# Patient Record
Sex: FEMALE | Race: WHITE | NOT HISPANIC OR LATINO | Employment: UNEMPLOYED | URBAN - METROPOLITAN AREA
[De-identification: names, ages, dates, MRNs, and addresses within clinical notes are randomized per-mention and may not be internally consistent; named-entity substitution may affect disease eponyms.]

---

## 2023-03-05 DIAGNOSIS — F41.9 ANXIETY: Primary | ICD-10-CM

## 2023-03-05 RX ORDER — CITALOPRAM 40 MG/1
40 TABLET, FILM COATED ORAL DAILY
Qty: 30 TABLET | Refills: 1 | Status: SHIPPED | OUTPATIENT
Start: 2023-03-05 | End: 2023-12-06 | Stop reason: WASHOUT

## 2023-03-30 DIAGNOSIS — F41.9 ANXIETY: Primary | ICD-10-CM

## 2023-03-30 RX ORDER — AMLODIPINE BESYLATE 2.5 MG/1
2.5 TABLET ORAL DAILY
COMMUNITY
End: 2023-10-02

## 2023-03-30 RX ORDER — ACETAMINOPHEN 325 MG/1
TABLET ORAL
COMMUNITY
Start: 2022-08-09 | End: 2023-12-06 | Stop reason: WASHOUT

## 2023-03-30 RX ORDER — FLUTICASONE FUROATE AND VILANTEROL TRIFENATATE 200; 25 UG/1; UG/1
POWDER RESPIRATORY (INHALATION)
COMMUNITY
Start: 2023-01-18

## 2023-03-30 RX ORDER — ORPHENADRINE CITRATE 100 MG/1
1 TABLET, EXTENDED RELEASE ORAL 2 TIMES DAILY
COMMUNITY
Start: 2022-08-09 | End: 2023-12-06 | Stop reason: WASHOUT

## 2023-03-30 RX ORDER — ACETAMINOPHEN 500 MG
TABLET ORAL
COMMUNITY
Start: 2017-11-14

## 2023-03-30 RX ORDER — ALBUTEROL SULFATE 90 UG/1
AEROSOL, METERED RESPIRATORY (INHALATION)
COMMUNITY
Start: 2021-11-01 | End: 2023-09-05 | Stop reason: SDUPTHER

## 2023-03-30 RX ORDER — NABUMETONE 500 MG/1
500 TABLET, FILM COATED ORAL 2 TIMES DAILY
COMMUNITY
End: 2024-03-08 | Stop reason: ALTCHOICE

## 2023-03-30 RX ORDER — APREMILAST 30 MG/1
1 TABLET, FILM COATED ORAL 2 TIMES DAILY
COMMUNITY
Start: 2022-08-15 | End: 2024-05-07 | Stop reason: WASHOUT

## 2023-03-30 RX ORDER — CALCIUM CARBONATE 600 MG
1 TABLET ORAL DAILY
COMMUNITY
Start: 2018-11-15 | End: 2023-12-08 | Stop reason: WASHOUT

## 2023-03-30 RX ORDER — ALBUTEROL SULFATE 0.83 MG/ML
SOLUTION RESPIRATORY (INHALATION)
COMMUNITY
Start: 2022-08-17

## 2023-03-30 RX ORDER — FOLIC ACID 1 MG/1
TABLET ORAL
COMMUNITY
End: 2023-12-29

## 2023-03-30 RX ORDER — CHLORTHALIDONE 25 MG/1
1 TABLET ORAL DAILY
COMMUNITY
Start: 2021-01-19 | End: 2024-04-15

## 2023-03-30 RX ORDER — LEVOTHYROXINE SODIUM 112 UG/1
TABLET ORAL
COMMUNITY
Start: 2023-03-05 | End: 2023-06-14 | Stop reason: ALTCHOICE

## 2023-03-30 RX ORDER — PANTOPRAZOLE SODIUM 40 MG/1
40 TABLET, DELAYED RELEASE ORAL 2 TIMES DAILY
COMMUNITY

## 2023-05-30 ENCOUNTER — TELEPHONE (OUTPATIENT)
Dept: PRIMARY CARE | Facility: CLINIC | Age: 53
End: 2023-05-30
Payer: COMMERCIAL

## 2023-05-30 DIAGNOSIS — E78.5 HYPERLIPIDEMIA, UNSPECIFIED HYPERLIPIDEMIA TYPE: Primary | ICD-10-CM

## 2023-06-14 ENCOUNTER — TELEMEDICINE (OUTPATIENT)
Dept: PRIMARY CARE | Facility: CLINIC | Age: 53
End: 2023-06-14
Payer: COMMERCIAL

## 2023-06-14 DIAGNOSIS — J45.909 ASTHMA, UNSPECIFIED ASTHMA SEVERITY, UNSPECIFIED WHETHER COMPLICATED, UNSPECIFIED WHETHER PERSISTENT (HHS-HCC): ICD-10-CM

## 2023-06-14 DIAGNOSIS — J45.901 EXACERBATION OF ASTHMA, UNSPECIFIED ASTHMA SEVERITY, UNSPECIFIED WHETHER PERSISTENT (HHS-HCC): ICD-10-CM

## 2023-06-14 DIAGNOSIS — J20.9 ACUTE BRONCHITIS, UNSPECIFIED ORGANISM: Primary | ICD-10-CM

## 2023-06-14 PROBLEM — K80.20 GALLSTONES: Status: ACTIVE | Noted: 2023-06-14

## 2023-06-14 PROBLEM — K21.9 ESOPHAGEAL REFLUX: Status: ACTIVE | Noted: 2023-06-14

## 2023-06-14 PROBLEM — R60.9 EDEMA: Status: ACTIVE | Noted: 2023-06-14

## 2023-06-14 PROBLEM — N28.1 CYST, KIDNEY, ACQUIRED: Status: ACTIVE | Noted: 2023-06-14

## 2023-06-14 PROBLEM — U07.1 COVID-19: Status: ACTIVE | Noted: 2023-06-14

## 2023-06-14 PROBLEM — G47.10 HYPERSOMNIA: Status: ACTIVE | Noted: 2023-06-14

## 2023-06-14 PROBLEM — E78.5 HYPERLIPIDEMIA: Status: ACTIVE | Noted: 2023-06-14

## 2023-06-14 PROBLEM — E03.9 HYPOTHYROIDISM: Status: ACTIVE | Noted: 2023-06-14

## 2023-06-14 PROBLEM — M79.646 FINGER PAIN: Status: ACTIVE | Noted: 2023-06-14

## 2023-06-14 PROBLEM — M50.93 CERVICAL DISC DISORDER OF CERVICOTHORACIC REGION: Status: ACTIVE | Noted: 2023-06-14

## 2023-06-14 PROBLEM — G47.33 OBSTRUCTIVE SLEEP APNEA, ADULT: Status: ACTIVE | Noted: 2023-06-14

## 2023-06-14 PROBLEM — F41.9 ANXIETY: Status: ACTIVE | Noted: 2023-06-14

## 2023-06-14 PROBLEM — L40.50 PSORIATIC ARTHROPATHY (MULTI): Status: ACTIVE | Noted: 2023-06-14

## 2023-06-14 PROBLEM — E87.6 HYPOKALEMIA: Status: ACTIVE | Noted: 2023-06-14

## 2023-06-14 PROBLEM — M15.9 OSTEOARTHRITIS, MULTIPLE SITES: Status: ACTIVE | Noted: 2023-06-14

## 2023-06-14 PROBLEM — E66.9 OBESITY (BMI 30-39.9): Status: ACTIVE | Noted: 2023-06-14

## 2023-06-14 PROBLEM — R93.1 AGATSTON CAC SCORE, <100: Status: ACTIVE | Noted: 2023-06-14

## 2023-06-14 PROCEDURE — 99214 OFFICE O/P EST MOD 30 MIN: CPT | Performed by: INTERNAL MEDICINE

## 2023-06-14 RX ORDER — LEVOTHYROXINE SODIUM 88 UG/1
88 TABLET ORAL DAILY
COMMUNITY
End: 2023-12-06 | Stop reason: DRUGHIGH

## 2023-06-14 RX ORDER — ROSUVASTATIN CALCIUM 40 MG/1
40 TABLET, COATED ORAL DAILY
COMMUNITY
End: 2023-12-12 | Stop reason: SDUPTHER

## 2023-06-14 RX ORDER — AMOXICILLIN AND CLAVULANATE POTASSIUM 400; 57 MG/5ML; MG/5ML
800 POWDER, FOR SUSPENSION ORAL 2 TIMES DAILY
Qty: 200 ML | Refills: 0 | Status: SHIPPED | OUTPATIENT
Start: 2023-06-14 | End: 2023-06-24

## 2023-06-14 RX ORDER — SULFASALAZINE 500 MG/1
TABLET ORAL
COMMUNITY
Start: 2023-06-12 | End: 2023-11-28

## 2023-06-14 RX ORDER — PREDNISONE 10 MG/1
TABLET ORAL
Qty: 21 TABLET | Refills: 0 | Status: SHIPPED | OUTPATIENT
Start: 2023-06-14 | End: 2023-06-29 | Stop reason: SDUPTHER

## 2023-06-14 RX ORDER — ECHINACEA 400 MG
CAPSULE ORAL
COMMUNITY
Start: 2021-02-02

## 2023-06-14 RX ORDER — GARLIC 1000 MG
CAPSULE ORAL
COMMUNITY
Start: 2018-11-15

## 2023-06-14 RX ORDER — IBUPROFEN 100 MG/5ML
1 SUSPENSION, ORAL (FINAL DOSE FORM) ORAL DAILY
COMMUNITY
Start: 2021-02-02

## 2023-06-14 RX ORDER — BUPROPION HYDROCHLORIDE 75 MG/1
75 TABLET ORAL DAILY
COMMUNITY
Start: 2023-05-18 | End: 2023-09-05 | Stop reason: DRUGHIGH

## 2023-06-14 ASSESSMENT — ENCOUNTER SYMPTOMS
COUGH: 1
WHEEZING: 1
FEVER: 0
FATIGUE: 1
CHILLS: 1
SORE THROAT: 1

## 2023-06-14 NOTE — PROGRESS NOTES
Subjective   Patient ID: Winter Feliz is a 52 y.o. female who presents for Cough (Pt has congested cough x 1 week, producing yellow and white phlegm.).  Cough  Associated symptoms include chills, a sore throat and wheezing. Pertinent negatives include no fever.       Cough 1 weeks with yellow pheghm.   Asthma  flareing.       Review of Systems   Constitutional:  Positive for chills and fatigue. Negative for fever.   HENT:  Positive for sore throat.    Respiratory:  Positive for cough and wheezing.    All other systems reviewed and are negative.      Objective   Physical Exam    Assessment/Plan   Problem List Items Addressed This Visit          Medium    Asthma exacerbation    Relevant Medications    predniSONE (Deltasone) 10 mg tablet    Asthma     Other Visit Diagnoses       Acute bronchitis, unspecified organism    -  Primary    Relevant Medications    amoxicillin-pot clavulanate (Augmentin) 400-57 mg/5 mL suspension    predniSONE (Deltasone) 10 mg tablet

## 2023-06-29 DIAGNOSIS — J20.9 ACUTE BRONCHITIS, UNSPECIFIED ORGANISM: ICD-10-CM

## 2023-06-29 DIAGNOSIS — J45.901 EXACERBATION OF ASTHMA, UNSPECIFIED ASTHMA SEVERITY, UNSPECIFIED WHETHER PERSISTENT (HHS-HCC): ICD-10-CM

## 2023-06-29 RX ORDER — PREDNISONE 10 MG/1
TABLET ORAL
Qty: 21 TABLET | Refills: 0 | Status: SHIPPED | OUTPATIENT
Start: 2023-06-29 | End: 2024-03-08 | Stop reason: ALTCHOICE

## 2023-08-28 PROBLEM — R06.02 SHORTNESS OF BREATH AT REST: Status: ACTIVE | Noted: 2023-08-28

## 2023-08-28 PROBLEM — N95.1 HOT FLASHES DUE TO MENOPAUSE: Status: ACTIVE | Noted: 2023-08-28
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
## 2023-08-28 PROBLEM — I10 BENIGN HYPERTENSION: Status: ACTIVE | Noted: 2023-08-28

## 2023-08-28 PROBLEM — E55.9 VITAMIN D DEFICIENCY: Status: ACTIVE | Noted: 2023-08-28

## 2023-08-28 PROBLEM — J45.20 MILD INTERMITTENT ASTHMA WITHOUT COMPLICATION (HHS-HCC): Status: ACTIVE | Noted: 2023-08-28

## 2023-08-28 PROBLEM — R11.2 NAUSEA & VOMITING: Status: ACTIVE | Noted: 2023-08-28

## 2023-08-28 PROBLEM — K76.9 DISORDER OF LIVER: Status: ACTIVE | Noted: 2023-08-28

## 2023-08-28 PROBLEM — C73 MALIGNANT NEOPLASM OF THYROID GLAND (MULTI): Status: ACTIVE | Noted: 2023-08-28

## 2023-08-28 PROBLEM — M77.10 EPICONDYLITIS, LATERAL: Status: ACTIVE | Noted: 2023-08-28

## 2023-08-28 PROBLEM — M25.569 KNEE PAIN: Status: ACTIVE | Noted: 2023-08-28

## 2023-08-28 PROBLEM — S16.1XXA STRAIN OF NECK MUSCLE: Status: ACTIVE | Noted: 2023-08-28

## 2023-08-28 PROBLEM — M54.50 LOW BACK PAIN: Status: ACTIVE | Noted: 2023-08-28

## 2023-08-28 PROBLEM — S09.90XA INJURY OF HEAD: Status: ACTIVE | Noted: 2023-08-28

## 2023-08-28 PROBLEM — F41.8 DEPRESSION WITH ANXIETY: Status: ACTIVE | Noted: 2023-08-28

## 2023-08-28 RX ORDER — CALCIUM CARBONATE 500(1250)
1 TABLET ORAL 2 TIMES DAILY
COMMUNITY
End: 2023-12-08 | Stop reason: WASHOUT

## 2023-08-28 RX ORDER — ATORVASTATIN CALCIUM 40 MG/1
40 TABLET, FILM COATED ORAL DAILY
COMMUNITY
End: 2023-12-08 | Stop reason: SDUPTHER

## 2023-08-28 RX ORDER — BUMETANIDE 0.5 MG/1
0.5 TABLET ORAL DAILY PRN
COMMUNITY
Start: 2019-02-06 | End: 2023-12-06 | Stop reason: WASHOUT

## 2023-08-28 RX ORDER — ESTRADIOL 1 MG/1
1 TABLET ORAL DAILY
COMMUNITY
End: 2023-12-06 | Stop reason: WASHOUT

## 2023-08-28 RX ORDER — POTASSIUM CHLORIDE 750 MG/1
10 TABLET, FILM COATED, EXTENDED RELEASE ORAL
COMMUNITY
Start: 2019-02-06 | End: 2023-12-08 | Stop reason: WASHOUT

## 2023-08-28 RX ORDER — CITALOPRAM 20 MG/1
20 TABLET, FILM COATED ORAL DAILY
COMMUNITY
Start: 2017-04-25 | End: 2023-12-06 | Stop reason: WASHOUT

## 2023-08-28 RX ORDER — LEVOTHYROXINE SODIUM 112 UG/1
112 TABLET ORAL
COMMUNITY
Start: 2022-09-06 | End: 2023-12-06 | Stop reason: WASHOUT

## 2023-08-28 RX ORDER — CELECOXIB 200 MG/1
1 CAPSULE ORAL DAILY
COMMUNITY
Start: 2019-02-07 | End: 2023-12-06 | Stop reason: WASHOUT

## 2023-08-28 RX ORDER — POTASSIUM CHLORIDE 750 MG/1
10 TABLET, FILM COATED, EXTENDED RELEASE ORAL DAILY
COMMUNITY
Start: 2016-05-05 | End: 2023-12-06 | Stop reason: WASHOUT

## 2023-08-28 RX ORDER — PROGESTERONE 100 MG/1
100 CAPSULE ORAL DAILY
COMMUNITY
End: 2023-12-06 | Stop reason: WASHOUT

## 2023-08-28 RX ORDER — LANOLIN ALCOHOL/MO/W.PET/CERES
300 CREAM (GRAM) TOPICAL EVERY EVENING
COMMUNITY
Start: 2015-06-17 | End: 2023-12-06 | Stop reason: WASHOUT

## 2023-08-28 RX ORDER — LEVOTHYROXINE SODIUM 175 UG/1
1 TABLET ORAL DAILY
COMMUNITY
Start: 2016-04-07 | End: 2023-12-06 | Stop reason: WASHOUT

## 2023-09-05 DIAGNOSIS — J45.901 EXACERBATION OF ASTHMA, UNSPECIFIED ASTHMA SEVERITY, UNSPECIFIED WHETHER PERSISTENT (HHS-HCC): ICD-10-CM

## 2023-09-05 DIAGNOSIS — J45.901 EXACERBATION OF ASTHMA, UNSPECIFIED ASTHMA SEVERITY, UNSPECIFIED WHETHER PERSISTENT (HHS-HCC): Primary | ICD-10-CM

## 2023-09-05 RX ORDER — ALBUTEROL SULFATE 90 UG/1
2 AEROSOL, METERED RESPIRATORY (INHALATION) EVERY 6 HOURS PRN
Qty: 54 G | Refills: 1 | Status: SHIPPED | OUTPATIENT
Start: 2023-09-05 | End: 2024-03-08 | Stop reason: ALTCHOICE

## 2023-09-05 RX ORDER — ALBUTEROL SULFATE 90 UG/1
2 AEROSOL, METERED RESPIRATORY (INHALATION) EVERY 6 HOURS PRN
Qty: 18 G | Refills: 3 | Status: SHIPPED | OUTPATIENT
Start: 2023-09-05 | End: 2023-09-05 | Stop reason: SDUPTHER

## 2023-09-05 RX ORDER — BUPROPION HYDROCHLORIDE 150 MG/1
150 TABLET ORAL DAILY
COMMUNITY
Start: 2023-08-24

## 2023-11-01 ENCOUNTER — APPOINTMENT (OUTPATIENT)
Dept: RHEUMATOLOGY | Facility: CLINIC | Age: 53
End: 2023-11-01
Payer: COMMERCIAL

## 2023-11-02 ENCOUNTER — APPOINTMENT (OUTPATIENT)
Dept: RHEUMATOLOGY | Facility: CLINIC | Age: 53
End: 2023-11-02
Payer: COMMERCIAL

## 2023-11-03 ENCOUNTER — APPOINTMENT (OUTPATIENT)
Dept: SLEEP MEDICINE | Facility: CLINIC | Age: 53
End: 2023-11-03
Payer: COMMERCIAL

## 2023-11-16 ENCOUNTER — LAB (OUTPATIENT)
Dept: LAB | Facility: LAB | Age: 53
End: 2023-11-16
Payer: COMMERCIAL

## 2023-11-16 DIAGNOSIS — C73 MALIGNANT NEOPLASM OF THYROID GLAND (MULTI): Primary | ICD-10-CM

## 2023-11-16 DIAGNOSIS — E78.5 HYPERLIPIDEMIA, UNSPECIFIED HYPERLIPIDEMIA TYPE: ICD-10-CM

## 2023-11-16 DIAGNOSIS — Z79.1 LONG TERM (CURRENT) USE OF NON-STEROIDAL ANTI-INFLAMMATORIES (NSAID): ICD-10-CM

## 2023-11-16 DIAGNOSIS — M06.09 RHEUMATOID ARTHRITIS WITHOUT RHEUMATOID FACTOR, MULTIPLE SITES (MULTI): ICD-10-CM

## 2023-11-16 LAB
ALBUMIN SERPL BCP-MCNC: 4.9 G/DL (ref 3.4–5)
ALP SERPL-CCNC: 67 U/L (ref 33–110)
ALT SERPL W P-5'-P-CCNC: 17 U/L (ref 7–45)
ANION GAP SERPL CALC-SCNC: 16 MMOL/L (ref 10–20)
APPEARANCE UR: CLEAR
AST SERPL W P-5'-P-CCNC: 19 U/L (ref 9–39)
BASOPHILS # BLD AUTO: 0.07 X10*3/UL (ref 0–0.1)
BASOPHILS NFR BLD AUTO: 1 %
BILIRUB SERPL-MCNC: 1 MG/DL (ref 0–1.2)
BILIRUB UR STRIP.AUTO-MCNC: NEGATIVE MG/DL
BUN SERPL-MCNC: 18 MG/DL (ref 6–23)
CALCIUM SERPL-MCNC: 9.7 MG/DL (ref 8.6–10.6)
CHLORIDE SERPL-SCNC: 99 MMOL/L (ref 98–107)
CHOLEST SERPL-MCNC: 200 MG/DL (ref 0–199)
CHOLESTEROL/HDL RATIO: 3.8
CK SERPL-CCNC: 193 U/L (ref 0–215)
CO2 SERPL-SCNC: 30 MMOL/L (ref 21–32)
COLOR UR: YELLOW
CREAT SERPL-MCNC: 0.9 MG/DL (ref 0.5–1.05)
CRP SERPL-MCNC: 0.26 MG/DL
EOSINOPHIL # BLD AUTO: 0.24 X10*3/UL (ref 0–0.7)
EOSINOPHIL NFR BLD AUTO: 3.4 %
ERYTHROCYTE [DISTWIDTH] IN BLOOD BY AUTOMATED COUNT: 13 % (ref 11.5–14.5)
ERYTHROCYTE [SEDIMENTATION RATE] IN BLOOD BY WESTERGREN METHOD: 22 MM/H (ref 0–30)
GFR SERPL CREATININE-BSD FRML MDRD: 76 ML/MIN/1.73M*2
GLUCOSE SERPL-MCNC: 98 MG/DL (ref 74–99)
GLUCOSE UR STRIP.AUTO-MCNC: NEGATIVE MG/DL
HCT VFR BLD AUTO: 39.4 % (ref 36–46)
HDLC SERPL-MCNC: 52.4 MG/DL
HGB BLD-MCNC: 13.1 G/DL (ref 12–16)
HOLD SPECIMEN: NORMAL
IMM GRANULOCYTES # BLD AUTO: 0.06 X10*3/UL (ref 0–0.7)
IMM GRANULOCYTES NFR BLD AUTO: 0.8 % (ref 0–0.9)
KETONES UR STRIP.AUTO-MCNC: NEGATIVE MG/DL
LDLC SERPL CALC-MCNC: 97 MG/DL
LEUKOCYTE ESTERASE UR QL STRIP.AUTO: NEGATIVE
LYMPHOCYTES # BLD AUTO: 2.65 X10*3/UL (ref 1.2–4.8)
LYMPHOCYTES NFR BLD AUTO: 37.4 %
MCH RBC QN AUTO: 27.8 PG (ref 26–34)
MCHC RBC AUTO-ENTMCNC: 33.2 G/DL (ref 32–36)
MCV RBC AUTO: 84 FL (ref 80–100)
MONOCYTES # BLD AUTO: 0.48 X10*3/UL (ref 0.1–1)
MONOCYTES NFR BLD AUTO: 6.8 %
NEUTROPHILS # BLD AUTO: 3.59 X10*3/UL (ref 1.2–7.7)
NEUTROPHILS NFR BLD AUTO: 50.6 %
NITRITE UR QL STRIP.AUTO: NEGATIVE
NON HDL CHOLESTEROL: 148 MG/DL (ref 0–149)
NRBC BLD-RTO: 0 /100 WBCS (ref 0–0)
PH UR STRIP.AUTO: 6 [PH]
PLATELET # BLD AUTO: 343 X10*3/UL (ref 150–450)
POTASSIUM SERPL-SCNC: 3.6 MMOL/L (ref 3.5–5.3)
PROT SERPL-MCNC: 7.4 G/DL (ref 6.4–8.2)
PROT UR STRIP.AUTO-MCNC: NEGATIVE MG/DL
RBC # BLD AUTO: 4.72 X10*6/UL (ref 4–5.2)
RBC # UR STRIP.AUTO: NEGATIVE /UL
SODIUM SERPL-SCNC: 141 MMOL/L (ref 136–145)
SP GR UR STRIP.AUTO: 1.02
TRIGL SERPL-MCNC: 252 MG/DL (ref 0–149)
TSH SERPL-ACNC: 14.86 MIU/L (ref 0.44–3.98)
UROBILINOGEN UR STRIP.AUTO-MCNC: <2 MG/DL
VLDL: 50 MG/DL (ref 0–40)
WBC # BLD AUTO: 7.1 X10*3/UL (ref 4.4–11.3)

## 2023-11-16 PROCEDURE — 81490 AUTOIMMUNE RA ALYS 12 BMRK: CPT

## 2023-11-16 PROCEDURE — 85652 RBC SED RATE AUTOMATED: CPT

## 2023-11-16 PROCEDURE — 85025 COMPLETE CBC W/AUTO DIFF WBC: CPT

## 2023-11-16 PROCEDURE — 86800 THYROGLOBULIN ANTIBODY: CPT

## 2023-11-16 PROCEDURE — 80061 LIPID PANEL: CPT

## 2023-11-16 PROCEDURE — 84432 ASSAY OF THYROGLOBULIN: CPT

## 2023-11-16 PROCEDURE — 36415 COLL VENOUS BLD VENIPUNCTURE: CPT

## 2023-11-16 PROCEDURE — 86140 C-REACTIVE PROTEIN: CPT

## 2023-11-16 PROCEDURE — 81003 URINALYSIS AUTO W/O SCOPE: CPT

## 2023-11-16 PROCEDURE — 80053 COMPREHEN METABOLIC PANEL: CPT

## 2023-11-16 PROCEDURE — 89240 UNLISTED MISC PATH TEST: CPT

## 2023-11-16 PROCEDURE — 82550 ASSAY OF CK (CPK): CPT

## 2023-11-16 PROCEDURE — 84443 ASSAY THYROID STIM HORMONE: CPT

## 2023-11-18 LAB
BILL ONLY-THYROGLOBULIN: NORMAL
THYROGLOB AB SERPL-ACNC: <0.9 IU/ML (ref 0–4)
THYROGLOB SERPL-MCNC: 0.2 NG/ML (ref 1.3–31.8)
THYROGLOB SERPL-MCNC: ABNORMAL NG/ML (ref 1.3–31.8)

## 2023-11-26 DIAGNOSIS — M06.09 RHEUMATOID ARTHRITIS WITHOUT RHEUMATOID FACTOR, MULTIPLE SITES (MULTI): ICD-10-CM

## 2023-11-27 LAB — SCAN RESULT: NORMAL

## 2023-11-28 RX ORDER — SULFASALAZINE 500 MG/1
500 TABLET ORAL 2 TIMES DAILY
Qty: 60 TABLET | Refills: 5 | Status: SHIPPED | OUTPATIENT
Start: 2023-11-28 | End: 2023-12-08

## 2023-12-06 ENCOUNTER — OFFICE VISIT (OUTPATIENT)
Dept: ENDOCRINOLOGY | Facility: CLINIC | Age: 53
End: 2023-12-06
Payer: COMMERCIAL

## 2023-12-06 VITALS
SYSTOLIC BLOOD PRESSURE: 133 MMHG | HEART RATE: 84 BPM | WEIGHT: 177 LBS | BODY MASS INDEX: 30.38 KG/M2 | DIASTOLIC BLOOD PRESSURE: 80 MMHG

## 2023-12-06 DIAGNOSIS — E89.0 POSTOPERATIVE HYPOTHYROIDISM: Primary | ICD-10-CM

## 2023-12-06 PROCEDURE — 99213 OFFICE O/P EST LOW 20 MIN: CPT | Performed by: INTERNAL MEDICINE

## 2023-12-06 PROCEDURE — 3079F DIAST BP 80-89 MM HG: CPT | Performed by: INTERNAL MEDICINE

## 2023-12-06 PROCEDURE — 1036F TOBACCO NON-USER: CPT | Performed by: INTERNAL MEDICINE

## 2023-12-06 PROCEDURE — 3075F SYST BP GE 130 - 139MM HG: CPT | Performed by: INTERNAL MEDICINE

## 2023-12-06 RX ORDER — LEVOTHYROXINE SODIUM 100 UG/1
100 TABLET ORAL
Qty: 90 TABLET | Refills: 3 | Status: SHIPPED | OUTPATIENT
Start: 2023-12-06 | End: 2024-02-06 | Stop reason: WASHOUT

## 2023-12-06 NOTE — PROGRESS NOTES
History Of Present Illness  Winter Feliz is a 53 y.o. female with postoperative hypothyroidism    History of thyroid cancer  Thyroid lobectomy and completion thyroidectomy ()  131-iodine ()    Synthroid decreased from 112 to 88 mcg/day  Patient is taking levothyroxine on an empty stomach with water alone.    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    Surgical History  She has a past surgical history that includes  section, classic (2016); Thyroid surgery (2016); Other surgical history (2022); Other surgical history (10/04/2022); and Other surgical history (10/04/2022).     Social History  She reports that she has never smoked. She has never used smokeless tobacco. She reports that she does not currently use alcohol. She reports that she does not use drugs.    Family History  Family History   Problem Relation Name Age of Onset    Breast cancer Mother          metastatic    No Known Problems Father      No Known Problems Daughter      No Known Problems Son      No Known Problems Mother's Sister      Hypertension Maternal Grandmother      Glaucoma Maternal Grandmother      No Known Problems Maternal Grandfather      No Known Problems Paternal Grandmother      No Known Problems Paternal Grandfather         Medications  Current Outpatient  Medications   Medication Instructions    albuterol 2.5 mg /3 mL (0.083 %) nebulizer solution inhalation    albuterol 90 mcg/actuation inhaler 2 puffs, inhalation, Every 6 hours PRN    amLODIPine (NORVASC) 2.5 mg, oral, Daily    ascorbic acid (Vitamin C) 1,000 mg tablet 1 tablet, oral, Daily    atorvastatin (LIPITOR) 40 mg, oral, Daily    Breo Ellipta 200-25 mcg/dose inhaler INHALE 1 PUFF ONCE A DAY 90    buPROPion XL (WELLBUTRIN XL) 150 mg, oral, Daily    calcium carbonate (Oscal) 500 mg calcium (1,250 mg) tablet 1 tablet, 2 times daily    calcium carbonate 600 mg calcium (1,500 mg) tablet 1 tablet, oral, Daily    chlorthalidone (Hygroton) 25 mg tablet 1 tablet, oral, Daily    cholecalciferol (Vitamin D-3) 50 mcg (2,000 unit) capsule oral    elderberry fruit and flower 460-115 mg capsule oral    folic acid (Folvite) 1 mg tablet TAKE 1 TABLET ORALLY ONCE A DAY EXCEPT THE DAY OF METHOTREXATE 90 DAYS    garlic 1,000 mg capsule oral    nabumetone (RELAFEN) 500 mg, oral, 2 times daily    Otezla 30 mg tablet 1 tablet, oral, 2 times daily    pantoprazole (PROTONIX) 40 mg, oral, 2 times daily    potassium chloride CR (Klor-Con 10) 10 mEq ER tablet 10 mEq, oral, 2 times daily with meals    predniSONE (Deltasone) 10 mg tablet Take 6 po day one, 5 po  day 2 , 4 po  day 3 , 3  po day 4, 2 po day  5, 1 po day 6    rosuvastatin (CRESTOR) 40 mg, oral, Daily    sulfaSALAzine (AZULFIDINE) 500 mg, oral, 2 times daily    Synthroid 88 mcg, oral, Daily       Allergies  Iodine      Last Recorded Vitals  Blood pressure 133/80, pulse 84, weight 80.3 kg (177 lb).    Physical Exam  Constitutional:       General: She is not in acute distress.  HENT:      Head: Normocephalic.   Neurological:      Mental Status: She is alert.   Psychiatric:         Mood and Affect: Affect normal.          Relevant Results  Lab Results   Component Value Date    TSH 14.86 (H) 11/16/2023    FREET4 2.1 (H) 05/30/2023         IMPRESSION  HYPOTHYROIDISM,  POSTOPERATIVE  History of thyroid cancer, surgery 16 years ago  Poor response to generic levothyroxine  Hypothyroid on Synthroid 88 mcg/day  TSH previously suppressed on 112 mcg/day    RECOMMENDATIONS  Synthroid 100 mcg once daily  Take levothyroxine on an empty stomach with water alone, 1 hour before eating or taking other medications, 4 hours before any calcium or iron supplement.    Follow up 4 months  Repeat TSH before next appointment

## 2023-12-06 NOTE — PATIENT INSTRUCTIONS
RECOMMENDATIONS  Synthroid 100 mcg once daily  Take levothyroxine on an empty stomach with water alone, 1 hour before eating or taking other medications, 4 hours before any calcium or iron supplement.    Follow up 4 months  Repeat TSH before next appointment

## 2023-12-07 DIAGNOSIS — E78.5 DYSLIPIDEMIA: Primary | ICD-10-CM

## 2023-12-07 RX ORDER — EZETIMIBE 10 MG/1
10 TABLET ORAL DAILY
Qty: 30 TABLET | Refills: 5 | Status: SHIPPED | OUTPATIENT
Start: 2023-12-07 | End: 2024-04-01

## 2023-12-08 ENCOUNTER — OFFICE VISIT (OUTPATIENT)
Dept: SLEEP MEDICINE | Facility: CLINIC | Age: 53
End: 2023-12-08
Payer: COMMERCIAL

## 2023-12-08 ENCOUNTER — OFFICE VISIT (OUTPATIENT)
Dept: RHEUMATOLOGY | Facility: CLINIC | Age: 53
End: 2023-12-08
Payer: COMMERCIAL

## 2023-12-08 VITALS
HEIGHT: 64 IN | SYSTOLIC BLOOD PRESSURE: 114 MMHG | BODY MASS INDEX: 30.26 KG/M2 | HEART RATE: 78 BPM | OXYGEN SATURATION: 96 % | WEIGHT: 177.25 LBS | DIASTOLIC BLOOD PRESSURE: 68 MMHG

## 2023-12-08 VITALS
BODY MASS INDEX: 30.63 KG/M2 | TEMPERATURE: 97.9 F | HEART RATE: 84 BPM | HEIGHT: 64 IN | WEIGHT: 179.4 LBS | DIASTOLIC BLOOD PRESSURE: 77 MMHG | SYSTOLIC BLOOD PRESSURE: 143 MMHG

## 2023-12-08 DIAGNOSIS — Z72.821 INADEQUATE SLEEP HYGIENE: ICD-10-CM

## 2023-12-08 DIAGNOSIS — L40.9 PSORIASIS: ICD-10-CM

## 2023-12-08 DIAGNOSIS — Z79.899 ENCOUNTER FOR LONG-TERM (CURRENT) USE OF MEDICATIONS: ICD-10-CM

## 2023-12-08 DIAGNOSIS — L40.50 PSORIATIC ARTHROPATHY (MULTI): Primary | ICD-10-CM

## 2023-12-08 DIAGNOSIS — M06.09 POLYARTHRITIS WITH NEGATIVE RHEUMATOID FACTOR (MULTI): ICD-10-CM

## 2023-12-08 DIAGNOSIS — M47.819 SPONDYLOARTHRITIS: ICD-10-CM

## 2023-12-08 DIAGNOSIS — G47.33 OBSTRUCTIVE SLEEP APNEA, ADULT: Primary | ICD-10-CM

## 2023-12-08 DIAGNOSIS — E55.9 VITAMIN D DEFICIENCY: ICD-10-CM

## 2023-12-08 DIAGNOSIS — I10 BENIGN HYPERTENSION: ICD-10-CM

## 2023-12-08 PROCEDURE — 1036F TOBACCO NON-USER: CPT | Performed by: INTERNAL MEDICINE

## 2023-12-08 PROCEDURE — 99215 OFFICE O/P EST HI 40 MIN: CPT | Performed by: INTERNAL MEDICINE

## 2023-12-08 PROCEDURE — 3077F SYST BP >= 140 MM HG: CPT | Performed by: STUDENT IN AN ORGANIZED HEALTH CARE EDUCATION/TRAINING PROGRAM

## 2023-12-08 PROCEDURE — 3074F SYST BP LT 130 MM HG: CPT | Performed by: INTERNAL MEDICINE

## 2023-12-08 PROCEDURE — 3078F DIAST BP <80 MM HG: CPT | Performed by: STUDENT IN AN ORGANIZED HEALTH CARE EDUCATION/TRAINING PROGRAM

## 2023-12-08 PROCEDURE — 1036F TOBACCO NON-USER: CPT | Performed by: STUDENT IN AN ORGANIZED HEALTH CARE EDUCATION/TRAINING PROGRAM

## 2023-12-08 PROCEDURE — 3078F DIAST BP <80 MM HG: CPT | Performed by: INTERNAL MEDICINE

## 2023-12-08 PROCEDURE — 95977 ALYS CPLX CN NPGT PRGRMG: CPT | Performed by: STUDENT IN AN ORGANIZED HEALTH CARE EDUCATION/TRAINING PROGRAM

## 2023-12-08 PROCEDURE — 99214 OFFICE O/P EST MOD 30 MIN: CPT | Performed by: STUDENT IN AN ORGANIZED HEALTH CARE EDUCATION/TRAINING PROGRAM

## 2023-12-08 ASSESSMENT — PATIENT HEALTH QUESTIONNAIRE - PHQ9
SUM OF ALL RESPONSES TO PHQ9 QUESTIONS 1 AND 2: 0
1. LITTLE INTEREST OR PLEASURE IN DOING THINGS: NOT AT ALL
2. FEELING DOWN, DEPRESSED OR HOPELESS: NOT AT ALL

## 2023-12-08 ASSESSMENT — ROUTINE ASSESSMENT OF PATIENT INDEX DATA (RAPID3)
TOTAL RAPID3 SCORE: 10.3
WASH_DRY_BODY: WITH SOME DIFFICULTY
SUM OF QUESTIONS A TO J: 13
LIFT_CUP_TO_MOUTH: WITH SOME DIFFICULTY
WEIGHTED_TOTAL_SCORE: 3.43
IN_OUT_TRANSPORT: WITH SOME DIFFICULTY
WALK_FLAT_GROUND: WITH SOME DIFFICULTY
ON A SCALE OF ONE TO TEN, HOW MUCH PAIN HAVE YOU HAD BECAUSE OF YOUR CONDITION OVER THE PAST WEEK?: 4
TURN_FAUCETS_OFF: WITH SOME DIFFICULTY
FN_SCORE: 4.3
ON A SCALE OF ONE TO TEN, HOW MUCH PAIN HAVE YOU HAD BECAUSE OF YOUR CONDITION OVER THE PAST WEEK?: 4
IN_OUT_BED: WITH SOME DIFFICULTY
PARTIPATE_RECREATIONAL_ACTIVITIES: UNABLE TO DO
ON A SCALE OF ONE TO TEN, CONSIDERING ALL THE WAYS IN WHICH ILLNESS AND HEALTH CONDITIONS MAY AFFECT YOU AT THIS TIME, PLEASE INDICATE BELOW HOW YOU ARE DOING:: 2
FEELINGS_DEPRESSION: WITH SOME DIFFICULTY
WALK_KILOMETERS: UNABLE TO DO
ON A SCALE OF ONE TO TEN, CONSIDERING ALL THE WAYS IN WHICH ILLNESS AND HEALTH CONDITIONS MAY AFFECT YOU AT THIS TIME, PLEASE INDICATE BELOW HOW YOU ARE DOING:: 2
FEELINGS_ANXIETY_NERVOUS: WITH SOME DIFFICULTY
SEVERITY_SCORE: 0
PICK_CLOTHES_OFF_FLOOR: WITH SOME DIFFICULTY
DRESS_YOURSELF: WITHOUT ANY DIFFICULTY
GOOD_NIGHTS_SLEEP: WITH SOME DIFFICULTY

## 2023-12-08 ASSESSMENT — SLEEP AND FATIGUE QUESTIONNAIRES
DIFFICULTY_STAYING_ASLEEP: MILD
HOW LIKELY ARE YOU TO NOD OFF OR FALL ASLEEP WHILE LYING DOWN TO REST IN THE AFTERNOON WHEN CIRCUMSTANCES PERMIT: HIGH CHANCE OF DOZING
HOW LIKELY ARE YOU TO NOD OFF OR FALL ASLEEP WHILE WATCHING TV: MODERATE CHANCE OF DOZING
HOW LIKELY ARE YOU TO NOD OFF OR FALL ASLEEP IN A CAR, WHILE STOPPED FOR A FEW MINUTES IN TRAFFIC: MODERATE CHANCE OF DOZING
WORRIED_DISTRESSED_DUE_TO_SLEEP: A LITTLE
HOW LIKELY ARE YOU TO NOD OFF OR FALL ASLEEP WHILE SITTING AND READING: MODERATE CHANCE OF DOZING
DIFFICULTY_FALLING_ASLEEP: MODERATE
HOW LIKELY ARE YOU TO NOD OFF OR FALL ASLEEP WHILE SITTING AND TALKING TO SOMEONE: WOULD NEVER DOZE
SLEEP_PROBLEM_NOTICEABLE_TO_OTHERS: A LITTLE
SITING INACTIVE IN A PUBLIC PLACE LIKE A CLASS ROOM OR A MOVIE THEATER: WOULD NEVER DOZE
SATISFACTION_WITH_CURRENT_SLEEP_PATTERN: SATISFIED
SLEEP_PROBLEM_INTERFERES_DAILY_ACTIVITIES: A LITTLE
ESS-CHAD TOTAL SCORE: 11
HOW LIKELY ARE YOU TO NOD OFF OR FALL ASLEEP WHILE SITTING QUIETLY AFTER LUNCH WITHOUT ALCOHOL: WOULD NEVER DOZE
HOW LIKELY ARE YOU TO NOD OFF OR FALL ASLEEP WHEN YOU ARE A PASSENGER IN A CAR FOR AN HOUR WITHOUT A BREAK: MODERATE CHANCE OF DOZING

## 2023-12-08 ASSESSMENT — PAIN SCALES - GENERAL: PAINLEVEL_OUTOF10: 4

## 2023-12-08 ASSESSMENT — ENCOUNTER SYMPTOMS
LOSS OF SENSATION IN FEET: 0
DEPRESSION: 1
OCCASIONAL FEELINGS OF UNSTEADINESS: 0

## 2023-12-08 ASSESSMENT — PAIN - FUNCTIONAL ASSESSMENT: PAIN_FUNCTIONAL_ASSESSMENT: 0-10

## 2023-12-08 NOTE — PROGRESS NOTES
[facility]/  Rheumatology  [address], [address]  [place] [address]  Phone: [phone]  Fax: [phone]    Rheumatology Progress Note 12/8/23    Winter Feliz is a 53 y.o. female here for   Chief Complaint   Patient presents with    Follow-up     labs       Last Visit: 6/12/23    Rheum Hx    Features of SpA with elbow enthesitis/medial epicondylitis, Achillis  tendonitis, sacroiliac tenderness, possible psoriasis versus seborrheic  dermatitis, in addition to OA  Responds well to nabumetone  Noted labs before showed mild transaminitis deemed likely fatty liver  Referred by Dr Campbell for evaluate and treat- generalized OA.  Chief complaint: Arthritis-osteoarthritis  Since 6 months ago  Slow onset  Progressive remitting course  Precipitating factors: Walking, housework, caregiver job  Associated symptoms: Stiffness, swelling  Better: Sitting  Worse: Activity  Previous treatments:  Naproxen-somewhat helpful  Ibuprofen-somewhat helpful  Tylenol- no help  Nabumetone-very helpful  Occupation: Caregiver  Currently tenderness of bilateral shoulders, elbows, lower back,  bilateral hands, bilateral knees, bilateral ankles  Swelling of bilateral ankles  5-10 minutes morning stiffness  Review of systems positive for:  Fatigue  Scalp tenderness/dandruff  Dry eyes dry mouth- Restasis no help and burned  Swelling of the legs by the end of the day  History of asthma- well controlled  Gastroesophageal reflux disease- controlled w PPI/IBS  Thyroid cancer 2007 s/p resection and radiotherapy  Rashes- around face on forehead- itchy, scaly, red  Joint pain swelling stiffness  Back pain that wakes her up from sleep and improves upon  getting up- elvira SI area and in betwee shoulder blades; hx of sciatica;  no hx of trauma  Muscle aches  Weakness arms legs sometimes probably due to pain  Thyroid disease  Last menstrual period 2007- endometrial ablation hx; DXA yrs  ok  Allergies  Depression  anxiety  Sleep disturbance- has IGGY and just had Inspire implant- not  on yet.  Component      Latest Ref Rng 9/23/2021 5/30/2023   DEEPIKA Negative…     DEEPIKA Titer Negative…     DEEPIKA Pattern (Note)…     SSA ANTIBODIES <0.2…     SSB ANTIBODIES <0.2…     Angiotensin 1 Conv 27…     HLA-B27 Dna Result Negative…     Citrulline Antibody, IgG <15…     Rheumatoid Factor      0 - 20 IU/ML 10     RNP Antibody <0.2…     Thyroglobulin Abs  <0.9…          Previous Tx  Naproxen-somewhat helpful  Ibuprofen-somewhat helpful  Tylenol- no help  Nabumetone-very helpful  Methotrexate- discontinued due to GI upset  SSZ- discontinued due to lack of efficacy  Otezla- currently on    Health Maintenance  DXA-  T -0.7 (Hip; 11/2021)  Component      Latest Ref Rng 9/23/2021   HIV 1/2 Antigen/Antibody Screen with Reflex to Confirmation Non Reactive…    HIV 1/2 Antibody Confirmation Test Not Indicated    Hiv Interpretation Negative…    Hepatitis B Surface AG      NEG  NEGATIVE    Hepatitis C AB Negative…      Component      Latest Ref Rng 10/5/2021   TB Result Negative…      Malignancy Hx- thyroid cancer, s/p removal  Immunization History   Administered Date(s) Administered    Flu vaccine (IIV4), preservative free *Check age/dose* 09/25/2016, 09/14/2020, 10/12/2021, 10/09/2022    Hepatitis B vaccine, adult (RECOMBIVAX, ENGERIX) 08/10/2011    Influenza Whole 09/13/2013    Influenza, Unspecified 09/22/2011    Influenza, injectable, MDCK, quadrivalent 09/25/2018    Influenza, injectable, quadrivalent 09/11/2017, 11/12/2019, 11/09/2020    Influenza, seasonal, injectable 10/11/2015, 10/12/2016    Novel influenza-H1N1-09, preservative-free 12/30/2009    Pfizer COVID-19 vaccine, bivalent, age 12 years and older (30 mcg/0.3 mL) 10/09/2022    Pfizer Purple Cap SARS-CoV-2 03/22/2021, 04/22/2021, 12/10/2021    Pneumococcal conjugate vaccine, 13-valent (PREVNAR 13) 12/12/2022    Pneumococcal polysaccharide vaccine, 23-valent, age 2 years and older (PNEUMOVAX  23) 2017    Td vaccine, age 7 years and older (TDVAX) 2009          Past Medical History:   Diagnosis Date    Asthma     Body mass index (BMI) 33.0-33.9, adult 2022    BMI 33.0-33.9,adult    Cyst of right kidney     resolved per last U/S    Dyslipidemia     Gallstones     GERD (gastroesophageal reflux disease)     History of dysphagia     since thyroidectomy    Hypertension     IGGY (obstructive sleep apnea)     Thyroid cancer (CMS/HCC)       Past Surgical History:   Procedure Laterality Date     SECTION, CLASSIC  2016     Section    FOOT SURGERY      cyst removed from L  foot between 1st and 2nd MTP    HAND SURGERY Right     R  cyst removal    OTHER SURGICAL HISTORY      hypoglossal nerve stimulator- INSPIRE    TOTAL THYROIDECTOMY      first partial, then total      Current Outpatient Medications   Medication Sig Dispense Refill    albuterol 2.5 mg /3 mL (0.083 %) nebulizer solution Inhale.      albuterol 90 mcg/actuation inhaler Inhale 2 puffs every 6 hours if needed for wheezing. 54 g 1    ascorbic acid (Vitamin C) 1,000 mg tablet Take 1 tablet (1,000 mg) by mouth once daily.      Breo Ellipta 200-25 mcg/dose inhaler INHALE 1 PUFF ONCE A DAY 90      buPROPion XL (Wellbutrin XL) 150 mg 24 hr tablet Take 1 tablet (150 mg) by mouth once daily.      chlorthalidone (Hygroton) 25 mg tablet Take 1 tablet (25 mg) by mouth once daily.      cholecalciferol (Vitamin D-3) 50 mcg (2,000 unit) capsule Take by mouth.      elderberry fruit and flower 460-115 mg capsule Take by mouth.      ezetimibe (Zetia) 10 mg tablet Take 1 tablet (10 mg) by mouth once daily. 30 tablet 5    garlic 1,000 mg capsule Take by mouth.      nabumetone (Relafen) 500 mg tablet Take 1 tablet (500 mg) by mouth 2 times a day.      Otezla 30 mg tablet Take 1 tablet (30 mg) by mouth 2 times a day.      pantoprazole (ProtoNix) 40 mg EC tablet Take 1 tablet (40 mg) by mouth 2 times a day.      predniSONE (Deltasone)  "10 mg tablet Take 6 po day one, 5 po  day 2 , 4 po  day 3 , 3  po day 4, 2 po day  5, 1 po day 6 21 tablet 0    Synthroid 100 mcg tablet Take 1 tablet (100 mcg) by mouth once daily in the morning. Take before meals. 90 tablet 3    amLODIPine (Norvasc) 2.5 mg tablet TAKE 1 TABLET BY MOUTH EVERY DAY 90 tablet 3    folic acid (Folvite) 1 mg tablet TAKE 1 TABLET ORALLY ONCE A DAY EXCEPT THE DAY OF METHOTREXATE 90 DAYS 90 tablet 3    rosuvastatin (Crestor) 40 mg tablet Take 1 tablet (40 mg) by mouth once daily. 90 tablet 3     No current facility-administered medications for this visit.      Allergies   Allergen Reactions    Lipitor [Atorvastatin] Other     Muscle aches    Methotrexate GI Upset    Iodine Other     vomiting        Vitals:    12/08/23 0900   BP: 114/68   Pulse: 78   SpO2: 96%   Weight: 80.4 kg (177 lb 4 oz)   Height: 1.626 m (5' 4\")     Pain Assessment Pain Score: 4     Rapid 3  Function Score (FN): 4.3  Pain Score (PN) (0-10): 4  Patient Global (PTGL) (0-10): 2  Rapid3 Score: 10.3  RAPID3 Weighted Score: 3.43       Workup  Component      Latest Ref Rng 11/16/2023   WBC      4.4 - 11.3 x10*3/uL 7.1    nRBC      0.0 - 0.0 /100 WBCs 0.0    RBC      4.00 - 5.20 x10*6/uL 4.72    HEMOGLOBIN      12.0 - 16.0 g/dL 13.1    HEMATOCRIT      36.0 - 46.0 % 39.4    MCV      80 - 100 fL 84    MCH      26.0 - 34.0 pg 27.8    MCHC      32.0 - 36.0 g/dL 33.2    RED CELL DISTRIBUTION WIDTH      11.5 - 14.5 % 13.0    Platelets      150 - 450 x10*3/uL 343    Neutrophils %      40.0 - 80.0 % 50.6    Immature Granulocytes %, Automated      0.0 - 0.9 % 0.8    Lymphocytes %      13.0 - 44.0 % 37.4    Monocytes %      2.0 - 10.0 % 6.8    Eosinophils %      0.0 - 6.0 % 3.4    Basophils %      0.0 - 2.0 % 1.0    Neutrophils Absolute      1.20 - 7.70 x10*3/uL 3.59    Immature Granulocytes Absolute, Automated      0.00 - 0.70 x10*3/uL 0.06    Lymphocytes Absolute      1.20 - 4.80 x10*3/uL 2.65    Monocytes Absolute      0.10 - 1.00 " x10*3/uL 0.48    Eosinophils Absolute      0.00 - 0.70 x10*3/uL 0.24    Basophils Absolute      0.00 - 0.10 x10*3/uL 0.07    GLUCOSE      74 - 99 mg/dL 98    SODIUM      136 - 145 mmol/L 141    POTASSIUM      3.5 - 5.3 mmol/L 3.6    CHLORIDE      98 - 107 mmol/L 99    Bicarbonate      21 - 32 mmol/L 30    Anion Gap      10 - 20 mmol/L 16    Blood Urea Nitrogen      6 - 23 mg/dL 18    Creatinine      0.50 - 1.05 mg/dL 0.90    EGFR      >60 mL/min/1.73m*2 76    Calcium      8.6 - 10.6 mg/dL 9.7    Albumin      3.4 - 5.0 g/dL 4.9    Alkaline Phosphatase      33 - 110 U/L 67    Total Protein      6.4 - 8.2 g/dL 7.4    AST      9 - 39 U/L 19    Bilirubin Total      0.0 - 1.2 mg/dL 1.0    ALT      7 - 45 U/L 17    Color, Urine      Straw, Yellow  Yellow    Appearance, Urine      Clear  Clear    Specific Gravity, Urine      1.005 - 1.035  1.019    pH, Urine      5.0, 5.5, 6.0, 6.5, 7.0, 7.5, 8.0  6.0    Protein, Urine      NEGATIVE mg/dL NEGATIVE    Glucose, Urine      NEGATIVE mg/dL NEGATIVE    Blood, Urine      NEGATIVE  NEGATIVE    Ketones, Urine      NEGATIVE mg/dL NEGATIVE    Bilirubin, Urine      NEGATIVE  NEGATIVE    Urobilinogen, Urine      <2.0 mg/dL <2.0    Nitrite, Urine      NEGATIVE  NEGATIVE    Leukocyte Esterase, Urine      NEGATIVE  NEGATIVE    Thyroglobulin      1.3 - 31.8 ng/mL 0.2 (L)    Thyroglobulin LC-MS/MS      1.3 - 31.8 ng/mL Not Applicable    Anti-Thyroglobulin AB      0.0 - 4.0 IU/mL <0.9    Extra Tube Hold for add-ons.    Thyroid Stimulating Hormone      0.44 - 3.98 mIU/L 14.86 (H)    Creatine Kinase      0 - 215 U/L 193    Sed Rate      0 - 30 mm/h 22    Vectra Scan Result 23 (Low)   C-Reactive Protein      <1.00 mg/dL 0.26         Assessment/Plan  1. Psoriatic arthropathy (CMS/HCC)    2. Psoriasis    3. Spondyloarthritis    4. Encounter for long-term (current) use of medications    5. Vitamin D deficiency    6. 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  Responds well to nabumetone  Fatty liver with mild transaminitis- monitor  Mildly elevated Hg likely due to IGGY  Since last appt, adherent and tolerating Otezla. Notes a   little help from Otezla.   Takes nabumetone-helps, taking sparingly.  Not on glucocorticoids  Active psoriasis on face and scalp, elbows and shoulders hurt.  Denies any recent infections.  ROS + sicca, joint pain, AM stiffness about 10-15 min.  Rapid 3 consistent with moderate severity.  Labs reviewed.   D/w pt tx options and could benefit from addition of biologic.   Continue rest of regimen in the meantime.   Advised of possible side effects and importance of monitoring.   All questions answered.  Patient to follow up with primary care provider regarding all other medical issues not addressed today.     Camille Monson MD      Patient Care Team:  Cristina Vázquez MD as PCP - General  Camille Monson MD as Consulting Physician (Rheumatology)

## 2023-12-09 PROBLEM — Z72.821 INADEQUATE SLEEP HYGIENE: Status: ACTIVE | Noted: 2023-12-09

## 2023-12-09 ASSESSMENT — ENCOUNTER SYMPTOMS
NEUROLOGICAL NEGATIVE: 1
CARDIOVASCULAR NEGATIVE: 1
RESPIRATORY NEGATIVE: 1
PSYCHIATRIC NEGATIVE: 1
CONSTITUTIONAL NEGATIVE: 1

## 2023-12-09 NOTE — ASSESSMENT & PLAN NOTE
Incoming settings: Usage: 32hrs/wk, Amplitude: 2.6V, Range: 2.3V - 2.9V, Configuration: +-+  HSAT while on this voltage showed uncontrolled IGGY, and I advised patient to step up on voltage.  However, she reports that high level caused her ear pain  We trials different electrode configuration today and she did well.   - Functional Threshold:  (Default) At activation FT: 0.9V; At visit on 12/9/22: FT: 1.8V; today 12/8/2023: 0.8v   Outgoing settings: Amplitude: 1.2V, Range: 1.0V - 2.0V, Configuration: 0-0, Start Delay: 60min (increased), Pause: 15min, Duration: 8hrs  Patient unable to tolerate levels where previous sleep study showed TA. Configuration changed with good tongue protrusion noted and patient confirming comfort.  - she is instructed to titrate her voltage level based on symptoms such as nocturnal awakening and snoring  - encouraged increase daily exercise

## 2023-12-09 NOTE — ASSESSMENT & PLAN NOTE
BP Readings from Last 1 Encounters:   12/08/23 143/77     - doing well, asymptomatic  - discussed at length the impact of untreated IGGY and BP control  - continue current management and follow-up with PCP

## 2023-12-09 NOTE — ASSESSMENT & PLAN NOTE
Talked about stimulus control (not watching tv in bed to start)  Patient voiced understanding and will start trying

## 2023-12-12 DIAGNOSIS — E78.2 MIXED HYPERLIPIDEMIA: Primary | ICD-10-CM

## 2023-12-12 RX ORDER — ROSUVASTATIN CALCIUM 40 MG/1
40 TABLET, COATED ORAL DAILY
Qty: 90 TABLET | Refills: 3 | Status: SHIPPED | OUTPATIENT
Start: 2023-12-12

## 2023-12-22 DIAGNOSIS — I10 BENIGN HYPERTENSION: ICD-10-CM

## 2023-12-24 RX ORDER — AMLODIPINE BESYLATE 2.5 MG/1
2.5 TABLET ORAL DAILY
Qty: 90 TABLET | Refills: 3 | Status: SHIPPED | OUTPATIENT
Start: 2023-12-24 | End: 2024-05-07 | Stop reason: SDUPTHER

## 2023-12-29 DIAGNOSIS — M06.09 RHEUMATOID ARTHRITIS WITHOUT RHEUMATOID FACTOR, MULTIPLE SITES (MULTI): ICD-10-CM

## 2023-12-29 RX ORDER — FOLIC ACID 1 MG/1
TABLET ORAL
Qty: 90 TABLET | Refills: 3 | Status: SHIPPED | OUTPATIENT
Start: 2023-12-29

## 2024-01-17 ENCOUNTER — TELEPHONE (OUTPATIENT)
Dept: RHEUMATOLOGY | Facility: CLINIC | Age: 54
End: 2024-01-17
Payer: COMMERCIAL

## 2024-01-17 DIAGNOSIS — L40.50 PSORIATIC ARTHROPATHY (MULTI): Primary | ICD-10-CM

## 2024-01-17 NOTE — TELEPHONE ENCOUNTER
Patient called stated that she took her  DTAP and second shingle vaccine. She also was checking on the new medication that you were ordering the injection medication for her arthritis. She did not know the name .ALLEN stated you were going to check to see what is covered by her insurance.

## 2024-01-19 ENCOUNTER — SPECIALTY PHARMACY (OUTPATIENT)
Dept: PHARMACY | Facility: CLINIC | Age: 54
End: 2024-01-19

## 2024-01-19 NOTE — TELEPHONE ENCOUNTER
Ordered Enbrel- weekly self injection. Pt to check with us in 2 weeks if she has not heard about PA approval or if she gets denial or med too expensive.

## 2024-01-22 RX ORDER — CHLORTHALIDONE 25 MG/1
TABLET ORAL
Qty: 90 TABLET | Refills: 3 | OUTPATIENT
Start: 2024-01-22

## 2024-02-06 ENCOUNTER — TELEMEDICINE (OUTPATIENT)
Dept: PRIMARY CARE | Facility: CLINIC | Age: 54
End: 2024-02-06
Payer: COMMERCIAL

## 2024-02-06 DIAGNOSIS — J45.909 ASTHMA, UNSPECIFIED ASTHMA SEVERITY, UNSPECIFIED WHETHER COMPLICATED, UNSPECIFIED WHETHER PERSISTENT (HHS-HCC): ICD-10-CM

## 2024-02-06 DIAGNOSIS — R05.1 ACUTE COUGH: ICD-10-CM

## 2024-02-06 DIAGNOSIS — U07.1 COVID-19: Primary | ICD-10-CM

## 2024-02-06 DIAGNOSIS — E03.9 HYPOTHYROIDISM, UNSPECIFIED TYPE: ICD-10-CM

## 2024-02-06 PROCEDURE — 99213 OFFICE O/P EST LOW 20 MIN: CPT | Performed by: FAMILY MEDICINE

## 2024-02-06 PROCEDURE — 1036F TOBACCO NON-USER: CPT | Performed by: FAMILY MEDICINE

## 2024-02-06 RX ORDER — LEVOTHYROXINE SODIUM 88 UG/1
88 TABLET ORAL DAILY
Qty: 30 TABLET | Refills: 11
Start: 2024-02-06 | End: 2024-05-07 | Stop reason: SDUPTHER

## 2024-02-06 NOTE — PROGRESS NOTES
Subjective   Patient ID: Winter Feliz is a 53 y.o. female who presents for No chief complaint on file..    Virtual or Telephone Consent    An interactive audio and video telecommunication system which permits real time communications between the patient (at the originating site) and provider (at the distant site) was utilized to provide this telehealth service.   Verbal consent was requested and obtained from Winter Feliz on this date, 02/06/24 for a telehealth visit.     Pt has been sick since Sunday with runny nose, nasal congestion  Pos COVID test this AM  She had COVID last August  She was on Paxlovid last August due to asthma  She is using her nebulizer  She has a productive         Review of Systems    Objective   There were no vitals taken for this visit.    Physical Exam  Constitutional:       Appearance: She is ill-appearing.   HENT:      Nose: Congestion present.   Pulmonary:      Effort: Pulmonary effort is normal.   Neurological:      Mental Status: She is alert.       Photo ID: verified  Med Allergies: verified  Med List: reviewed and her Synthroid dose was updated per her report  Virtual Visit Duration: 8 min      Assessment/Plan   Diagnoses and all orders for this visit:  COVID-19  -     nirmatrelvir-ritonavir (PAXLOVID) 300 mg (150 mg x 2)-100 mg tablet therapy pack; Take 3 tablets by mouth 2 times a day for 5 days. Follow the instructions on the package  Acute cough  -     nirmatrelvir-ritonavir (PAXLOVID) 300 mg (150 mg x 2)-100 mg tablet therapy pack; Take 3 tablets by mouth 2 times a day for 5 days. Follow the instructions on the package  Hypothyroidism, unspecified type  -     Synthroid 88 mcg tablet; Take 1 tablet (88 mcg) by mouth once daily.  Asthma, unspecified asthma severity, unspecified whether complicated, unspecified whether persistent    Discussed the R/B/SE of Paxlovid, pt has been on in the past and has tolerated it well  Hold statin (Crestor) x 2 weeks due to interaction w/  Paxlovid    [facility] supportive care at this time, fluids, rest, add Mucinex  Use Albuterol PRN  Advised ED visit with any worsening SOB or breathing difficulties  Reviewed CDC guidelines for quarantine and return to work    I advised the patient to have an in person exam with PCP, urgent care, or Emergency Room with any exacerbation of symptoms. The patient understands and agrees.       Elsy Rivas, DO

## 2024-02-08 DIAGNOSIS — L40.50 PSORIATIC ARTHROPATHY (MULTI): ICD-10-CM

## 2024-02-16 ENCOUNTER — APPOINTMENT (OUTPATIENT)
Dept: PRIMARY CARE | Facility: CLINIC | Age: 54
End: 2024-02-16
Payer: COMMERCIAL

## 2024-02-21 ENCOUNTER — APPOINTMENT (OUTPATIENT)
Dept: ENDOCRINOLOGY | Facility: CLINIC | Age: 54
End: 2024-02-21
Payer: COMMERCIAL

## 2024-02-29 ENCOUNTER — APPOINTMENT (OUTPATIENT)
Dept: PRIMARY CARE | Facility: CLINIC | Age: 54
End: 2024-02-29
Payer: COMMERCIAL

## 2024-03-02 ENCOUNTER — APPOINTMENT (OUTPATIENT)
Dept: PRIMARY CARE | Facility: CLINIC | Age: 54
End: 2024-03-02
Payer: COMMERCIAL

## 2024-03-08 ENCOUNTER — OFFICE VISIT (OUTPATIENT)
Dept: SLEEP MEDICINE | Facility: CLINIC | Age: 54
End: 2024-03-08
Payer: COMMERCIAL

## 2024-03-08 VITALS
BODY MASS INDEX: 29.63 KG/M2 | HEART RATE: 87 BPM | SYSTOLIC BLOOD PRESSURE: 145 MMHG | TEMPERATURE: 98 F | DIASTOLIC BLOOD PRESSURE: 78 MMHG | WEIGHT: 172.6 LBS

## 2024-03-08 DIAGNOSIS — Z72.821 INADEQUATE SLEEP HYGIENE: ICD-10-CM

## 2024-03-08 DIAGNOSIS — I10 BENIGN HYPERTENSION: ICD-10-CM

## 2024-03-08 DIAGNOSIS — G47.33 OBSTRUCTIVE SLEEP APNEA, ADULT: Primary | ICD-10-CM

## 2024-03-08 PROCEDURE — 1036F TOBACCO NON-USER: CPT | Performed by: STUDENT IN AN ORGANIZED HEALTH CARE EDUCATION/TRAINING PROGRAM

## 2024-03-08 PROCEDURE — 99214 OFFICE O/P EST MOD 30 MIN: CPT | Performed by: STUDENT IN AN ORGANIZED HEALTH CARE EDUCATION/TRAINING PROGRAM

## 2024-03-08 PROCEDURE — 95977 ALYS CPLX CN NPGT PRGRMG: CPT | Performed by: STUDENT IN AN ORGANIZED HEALTH CARE EDUCATION/TRAINING PROGRAM

## 2024-03-08 PROCEDURE — 3078F DIAST BP <80 MM HG: CPT | Performed by: STUDENT IN AN ORGANIZED HEALTH CARE EDUCATION/TRAINING PROGRAM

## 2024-03-08 PROCEDURE — 3077F SYST BP >= 140 MM HG: CPT | Performed by: STUDENT IN AN ORGANIZED HEALTH CARE EDUCATION/TRAINING PROGRAM

## 2024-03-08 ASSESSMENT — SLEEP AND FATIGUE QUESTIONNAIRES
SLEEP_PROBLEM_NOTICEABLE_TO_OTHERS: A LITTLE
ESS-CHAD TOTAL SCORE: 9
SITING INACTIVE IN A PUBLIC PLACE LIKE A CLASS ROOM OR A MOVIE THEATER: WOULD NEVER DOZE
HOW LIKELY ARE YOU TO NOD OFF OR FALL ASLEEP WHILE LYING DOWN TO REST IN THE AFTERNOON WHEN CIRCUMSTANCES PERMIT: HIGH CHANCE OF DOZING
HOW LIKELY ARE YOU TO NOD OFF OR FALL ASLEEP WHEN YOU ARE A PASSENGER IN A CAR FOR AN HOUR WITHOUT A BREAK: SLIGHT CHANCE OF DOZING
WORRIED_DISTRESSED_DUE_TO_SLEEP: A LITTLE
SLEEP_PROBLEM_INTERFERES_DAILY_ACTIVITIES: A LITTLE
HOW LIKELY ARE YOU TO NOD OFF OR FALL ASLEEP WHILE SITTING AND READING: MODERATE CHANCE OF DOZING
HOW LIKELY ARE YOU TO NOD OFF OR FALL ASLEEP WHILE WATCHING TV: MODERATE CHANCE OF DOZING
HOW LIKELY ARE YOU TO NOD OFF OR FALL ASLEEP IN A CAR, WHILE STOPPED FOR A FEW MINUTES IN TRAFFIC: SLIGHT CHANCE OF DOZING
SATISFACTION_WITH_CURRENT_SLEEP_PATTERN: SATISFIED
HOW LIKELY ARE YOU TO NOD OFF OR FALL ASLEEP WHILE SITTING AND TALKING TO SOMEONE: WOULD NEVER DOZE
HOW LIKELY ARE YOU TO NOD OFF OR FALL ASLEEP WHILE SITTING QUIETLY AFTER LUNCH WITHOUT ALCOHOL: WOULD NEVER DOZE

## 2024-03-08 ASSESSMENT — ENCOUNTER SYMPTOMS
NEUROLOGICAL NEGATIVE: 1
CONSTITUTIONAL NEGATIVE: 1
PSYCHIATRIC NEGATIVE: 1
CARDIOVASCULAR NEGATIVE: 1
RESPIRATORY NEGATIVE: 1

## 2024-03-08 NOTE — PROGRESS NOTES
Patient: Winter Feliz    [id number]  : 1970 -- AGE 53 y.o.    Provider: Sarthak Birmingham MD     Location [facility]   Service Date: 3/8/2024              [facility]  Followup Visit Note    HISTORY OF PRESENT ILLNESS     HISTORY OF PRESENT ILLNESS   Winter Feliz is a 53 y.o. female with h/o Hypertension and IGGY who presents to a [facility] for followup.     Assessment and plan from last visit: 2023     Ms. Feliz is a 53 y.o. female and she returns in followup to the [facility] for IGGY.     Problem List, Orders, Assessment, Recommendations:  Problem List Items Addressed This Visit               ICD-10-CM     Obstructive sleep apnea, adult - Primary G47.33       Incoming settings: Usage: 32hrs/wk, Amplitude: 2.6V, Range: 2.3V - 2.9V, Configuration: +-+  HSAT while on this voltage showed uncontrolled IGGY, and I advised patient to step up on voltage.  However, she reports that high level caused her ear pain  We trials different electrode configuration today and she did well.   - Functional Threshold:  (Default) At activation FT: 0.9V; At visit on 22: FT: 1.8V; today 2023: 0.8v   Outgoing settings: Amplitude: 1.2V, Range: 1.0V - 2.0V, Configuration: 0-0, Start Delay: 60min (increased), Pause: 15min, Duration: 8hrs  Patient unable to tolerate levels where previous sleep study showed TA. Configuration changed with good tongue protrusion noted and patient confirming comfort.  - she is instructed to titrate her voltage level based on symptoms such as nocturnal awakening and snoring  - encouraged increase daily exercise              Benign hypertension I10           BP Readings from Last 1 Encounters:   23 143/77   - doing well, asymptomatic  - discussed at length the impact of untreated IGGY and BP control  - continue current management and follow-up with PCP            Inadequate sleep hygiene  Z72.821       Talked about stimulus control (not watching tv in bed to start)  Patient voiced understanding and will start trying                  Disposition     Return to clinic in 3 months    Current History    On today's visit, the patient reports that after the adjustment of electrode configuration last time, the therapy had become a lot more tolerable and she is very happy with it.  Due to her job, she still sometimes has difficulty using the therapy consistently but whenever she uses it, she does feel better and she is averaging about 6 hours per night.  BP ok today    Daytime Symptoms    Patient reports DAYTIME SYMPTOMS: no daytime symptoms  Patient denies daytime symptoms including: Denies: excessive daytime sleepiness    Naps: No  Fatigue: denies feeling fatigue    ESS: 9  LISE: 4  FOSQ: 21    REVIEW OF SYSTEMS     REVIEW OF SYSTEMS  Review of Systems   Constitutional: Negative.    HENT: Negative.     Respiratory: Negative.     Cardiovascular: Negative.    Genitourinary: Negative.    Skin: Negative.    Neurological: Negative.    Psychiatric/Behavioral: Negative.           ALLERGIES AND MEDICATIONS     ALLERGIES  Allergies   Allergen Reactions    Lipitor [Atorvastatin] Other     Muscle aches    Methotrexate GI Upset    Iodine Other     vomiting       MEDICATIONS: She has a current medication list which includes the following prescription(s): albuterol - Inhale, amlodipine - TAKE 1 TABLET BY MOUTH EVERY DAY, ascorbic acid - Take 1 tablet (1,000 mg) by mouth once daily, breo ellipta - INHALE 1 PUFF ONCE A DAY 90, bupropion xl - Take 1 tablet (150 mg) by mouth once daily, chlorthalidone - Take 1 tablet (25 mg) by mouth once daily, cholecalciferol - Take by mouth, elderberry fruit and flower - Take by mouth, etanercept - Inject 1 mL (50 mg) under the skin 1 (one) time per week, ezetimibe - Take 1 tablet (10 mg) by mouth once daily, folic acid - TAKE 1 TABLET ORALLY ONCE A DAY EXCEPT THE DAY OF METHOTREXATE 90  DAYS, garlic - Take by mouth, otezla - Take 1 tablet (30 mg) by mouth 2 times a day, pantoprazole - Take 1 tablet (40 mg) by mouth 2 times a day, rosuvastatin - Take 1 tablet (40 mg) by mouth once daily, and synthroid - Take 1 tablet (88 mcg) by mouth once daily (Patient not taking: Reported on 3/8/2024).    PAST MEDICAL HISTORY : She  has a past medical history of Asthma, Body mass index (BMI) 33.0-33.9, adult (2022), Cyst of right kidney, Dyslipidemia, Gallstones, GERD (gastroesophageal reflux disease), History of dysphagia, Hypertension, IGGY (obstructive sleep apnea), and Thyroid cancer (CMS/HCC).    PAST SURGICAL HISTORY: She  has a past surgical history that includes  section, classic (2016); Other surgical history; Total thyroidectomy (); Hand surgery (Right); and Foot surgery.     FAMILY HISTORY: No changes since previous visit. Otherwise non-contributory as charted.     SOCIAL HISTORY  She  reports that she has never smoked. She has never used smokeless tobacco. She reports that she does not currently use alcohol. She reports that she does not use drugs.       PHYSICAL EXAM     VITAL SIGNS: /78 (BP Location: Right arm, Patient Position: Sitting, BP Cuff Size: Adult)   Pulse 87   Temp 36.7 °C (98 °F) (Temporal)   Wt 78.3 kg (172 lb 9.6 oz)   BMI 29.63 kg/m²      PREVIOUS WEIGHTS:  Wt Readings from Last 3 Encounters:   24 78.3 kg (172 lb 9.6 oz)   23 81.4 kg (179 lb 6.4 oz)   23 80.4 kg (177 lb 4 oz)         RESULTS/DATA     Bicarbonate   Date Value   2023 30 mmol/L   2023 29 MMOL/L   2023 29 MMOL/L   2022 30 mmol/L   2022 30 mmol/L     Ferritin (NG/ML)   Date Value   2021 150       PAP Adherence  Not applicable    INSPIRE ADHERENCE        ASSESSMENT/PLAN     Ms. Feliz is a 53 y.o. female and she returns in followup to the [facility] for IGGY.    Problem List, Orders, Assessment,  Recommendations:  Problem List Items Addressed This Visit             ICD-10-CM    Obstructive sleep apnea, adult - Primary G47.33     Last visit in 2023:  Incoming settings: Usage: 32hrs/wk, Amplitude: 2.6V, Range: 2.3V - 2.9V, Configuration: +-+  HSAT while on this voltage showed uncontrolled IGGY, and I advised patient to step up on voltage.  However, she reports that high level caused her ear pain  We trials different electrode configuration today and she did well.   - Functional Threshold:  (Default) At activation FT: 0.9V; At visit on 22: FT: 1.8V; today 2023: 0.8v   Outgoing settings: Amplitude: 1.2V, Range: 1.0V - 2.0V, Configuration: 0-0, Start Delay: 60min (increased), Pause: 15min, Duration: 8hrs  Patient unable to tolerate levels where previous sleep study showed TA. Configuration changed with good tongue protrusion noted and patient confirming comfort.  - she is instructed to titrate her voltage level based on symptoms such as nocturnal awakening and snoring  - encouraged increase daily exercise    Today's visit: 3/8/2024  Usage: 6.25 hr/night  Incoming settings: confi-0, 1.0-2.0 @ 1.4v; start delay 60 min, pause 15 min, duration 8 hr  Outgoing settings: confi-0, 1.0-1.5 @ 1.4v; start delay 50 min, pause 20 min, duration 8 hr    If she continues to do well with the therapy and able to show consistency in the next 3-4 months, we will re-evaluate and repeat HSAT testing at follow-up visit            Relevant Orders    Follow Up In Adult Sleep Medicine    Benign hypertension I10     BP Readings from Last 1 Encounters:   24 145/78   - doing well, asymptomatic  - discussed at length the impact of untreated IGGY and BP control  - continue current management and follow-up with PCP          Inadequate sleep hygiene Z72.821     Better now  Continue practicing good sleep hygiene          Disposition    Return to clinic in 4-5 months

## 2024-03-08 NOTE — ASSESSMENT & PLAN NOTE
BP Readings from Last 1 Encounters:   03/08/24 145/78     - doing well, asymptomatic  - discussed at length the impact of untreated IGGY and BP control  - continue current management and follow-up with PCP    [facility]

## 2024-03-08 NOTE — ASSESSMENT & PLAN NOTE
Last visit in 2023:  Incoming settings: Usage: 32hrs/wk, Amplitude: 2.6V, Range: 2.3V - 2.9V, Configuration: +-+  HSAT while on this voltage showed uncontrolled IGGY, and I advised patient to step up on voltage.  However, she reports that high level caused her ear pain  We trials different electrode configuration today and she did well.   - Functional Threshold:  (Default) At activation FT: 0.9V; At visit on 22: FT: 1.8V; today 2023: 0.8v   Outgoing settings: Amplitude: 1.2V, Range: 1.0V - 2.0V, Configuration: 0-0, Start Delay: 60min (increased), Pause: 15min, Duration: 8hrs  Patient unable to tolerate levels where previous sleep study showed TA. Configuration changed with good tongue protrusion noted and patient confirming comfort.  - she is instructed to titrate her voltage level based on symptoms such as nocturnal awakening and snoring  - encouraged increase daily exercise    Today's visit: 3/8/2024  Usage: 6.25 hr/night  Incoming settings: confi-0, 1.0-2.0 @ 1.4v; start delay 60 min, pause 15 min, duration 8 hr  Outgoing settings: confi-0, 1.0-1.5 @ 1.4v; start delay 50 min, pause 20 min, duration 8 hr    If she continues to do well with the therapy and able to show consistency in the next 3-4 months, we will re-evaluate and repeat HSAT testing at follow-up visit

## 2024-04-01 DIAGNOSIS — E78.5 DYSLIPIDEMIA: ICD-10-CM

## 2024-04-01 RX ORDER — EZETIMIBE 10 MG/1
10 TABLET ORAL DAILY
Qty: 90 TABLET | Refills: 1 | Status: SHIPPED | OUTPATIENT
Start: 2024-04-01

## 2024-04-09 ENCOUNTER — APPOINTMENT (OUTPATIENT)
Dept: ENDOCRINOLOGY | Facility: CLINIC | Age: 54
End: 2024-04-09
Payer: COMMERCIAL

## 2024-04-14 DIAGNOSIS — I10 BENIGN HYPERTENSION: Primary | ICD-10-CM

## 2024-04-15 RX ORDER — CHLORTHALIDONE 25 MG/1
TABLET ORAL
Qty: 90 TABLET | Refills: 3 | Status: SHIPPED | OUTPATIENT
Start: 2024-04-15

## 2024-04-25 ENCOUNTER — LAB (OUTPATIENT)
Dept: LAB | Facility: LAB | Age: 54
End: 2024-04-25
Payer: COMMERCIAL

## 2024-04-25 DIAGNOSIS — M06.09 POLYARTHRITIS WITH NEGATIVE RHEUMATOID FACTOR (MULTI): ICD-10-CM

## 2024-04-25 DIAGNOSIS — E55.9 VITAMIN D DEFICIENCY: ICD-10-CM

## 2024-04-25 DIAGNOSIS — E89.0 POSTOPERATIVE HYPOTHYROIDISM: ICD-10-CM

## 2024-04-25 DIAGNOSIS — Z79.899 ENCOUNTER FOR LONG-TERM (CURRENT) USE OF MEDICATIONS: ICD-10-CM

## 2024-04-25 DIAGNOSIS — L40.50 PSORIATIC ARTHROPATHY (MULTI): ICD-10-CM

## 2024-04-25 LAB
25(OH)D3 SERPL-MCNC: 28 NG/ML (ref 31–100)
ALBUMIN SERPL-MCNC: 4.6 G/DL (ref 3.5–5)
ALP BLD-CCNC: 60 U/L (ref 35–125)
ALT SERPL-CCNC: 23 U/L (ref 5–40)
ANION GAP SERPL CALC-SCNC: 11 MMOL/L
AST SERPL-CCNC: 24 U/L (ref 5–40)
BASOPHILS # BLD AUTO: 0.08 X10*3/UL (ref 0–0.1)
BASOPHILS NFR BLD AUTO: 1.1 %
BILIRUB SERPL-MCNC: 0.7 MG/DL (ref 0.1–1.2)
BUN SERPL-MCNC: 15 MG/DL (ref 8–25)
CALCIUM SERPL-MCNC: 9.2 MG/DL (ref 8.5–10.4)
CHLORIDE SERPL-SCNC: 101 MMOL/L (ref 97–107)
CK SERPL-CCNC: 320 U/L (ref 24–195)
CO2 SERPL-SCNC: 29 MMOL/L (ref 24–31)
CREAT SERPL-MCNC: 0.8 MG/DL (ref 0.4–1.6)
CRP SERPL-MCNC: <0.3 MG/DL (ref 0–2)
EGFRCR SERPLBLD CKD-EPI 2021: 87 ML/MIN/1.73M*2
EOSINOPHIL # BLD AUTO: 0.22 X10*3/UL (ref 0–0.7)
EOSINOPHIL NFR BLD AUTO: 3 %
ERYTHROCYTE [DISTWIDTH] IN BLOOD BY AUTOMATED COUNT: 14.3 % (ref 11.5–14.5)
ERYTHROCYTE [SEDIMENTATION RATE] IN BLOOD BY WESTERGREN METHOD: 8 MM/H (ref 0–30)
GLUCOSE SERPL-MCNC: 94 MG/DL (ref 65–99)
HCT VFR BLD AUTO: 40.1 % (ref 36–46)
HGB BLD-MCNC: 12.8 G/DL (ref 12–16)
HOLD SPECIMEN: NORMAL
IMM GRANULOCYTES # BLD AUTO: 0.02 X10*3/UL (ref 0–0.7)
IMM GRANULOCYTES NFR BLD AUTO: 0.3 % (ref 0–0.9)
LYMPHOCYTES # BLD AUTO: 3.23 X10*3/UL (ref 1.2–4.8)
LYMPHOCYTES NFR BLD AUTO: 44.4 %
MCH RBC QN AUTO: 26.8 PG (ref 26–34)
MCHC RBC AUTO-ENTMCNC: 31.9 G/DL (ref 32–36)
MCV RBC AUTO: 84 FL (ref 80–100)
MONOCYTES # BLD AUTO: 0.47 X10*3/UL (ref 0.1–1)
MONOCYTES NFR BLD AUTO: 6.5 %
NEUTROPHILS # BLD AUTO: 3.26 X10*3/UL (ref 1.2–7.7)
NEUTROPHILS NFR BLD AUTO: 44.7 %
NRBC BLD-RTO: 0 /100 WBCS (ref 0–0)
PLATELET # BLD AUTO: 340 X10*3/UL (ref 150–450)
POTASSIUM SERPL-SCNC: 3.9 MMOL/L (ref 3.4–5.1)
PROT SERPL-MCNC: 6.9 G/DL (ref 5.9–7.9)
RBC # BLD AUTO: 4.78 X10*6/UL (ref 4–5.2)
SODIUM SERPL-SCNC: 141 MMOL/L (ref 133–145)
TSH SERPL DL<=0.05 MIU/L-ACNC: 37.37 MIU/L (ref 0.27–4.2)
WBC # BLD AUTO: 7.3 X10*3/UL (ref 4.4–11.3)

## 2024-04-25 PROCEDURE — 80053 COMPREHEN METABOLIC PANEL: CPT

## 2024-04-25 PROCEDURE — 85652 RBC SED RATE AUTOMATED: CPT

## 2024-04-25 PROCEDURE — 36415 COLL VENOUS BLD VENIPUNCTURE: CPT

## 2024-04-25 PROCEDURE — 84443 ASSAY THYROID STIM HORMONE: CPT

## 2024-04-25 PROCEDURE — 82550 ASSAY OF CK (CPK): CPT

## 2024-04-25 PROCEDURE — 85025 COMPLETE CBC W/AUTO DIFF WBC: CPT

## 2024-04-25 PROCEDURE — 86140 C-REACTIVE PROTEIN: CPT

## 2024-04-25 PROCEDURE — 82306 VITAMIN D 25 HYDROXY: CPT

## 2024-04-30 LAB — SCAN RESULT: NORMAL

## 2024-05-01 RX ORDER — CITALOPRAM 40 MG/1
40 TABLET, FILM COATED ORAL DAILY
COMMUNITY
Start: 2024-03-01

## 2024-05-01 RX ORDER — CALCIUM/D3/MAG/K2/MIN/HERB 326 333-32 MG
TABLET ORAL
COMMUNITY

## 2024-05-02 DIAGNOSIS — L40.50 PSORIATIC ARTHROPATHY (MULTI): ICD-10-CM

## 2024-05-07 ENCOUNTER — OFFICE VISIT (OUTPATIENT)
Dept: RHEUMATOLOGY | Facility: CLINIC | Age: 54
End: 2024-05-07
Payer: COMMERCIAL

## 2024-05-07 VITALS — BODY MASS INDEX: 30.18 KG/M2 | WEIGHT: 175.8 LBS | DIASTOLIC BLOOD PRESSURE: 82 MMHG | SYSTOLIC BLOOD PRESSURE: 110 MMHG

## 2024-05-07 DIAGNOSIS — L40.50 PSORIATIC ARTHROPATHY (MULTI): ICD-10-CM

## 2024-05-07 DIAGNOSIS — L40.9 PSORIASIS: ICD-10-CM

## 2024-05-07 DIAGNOSIS — Z79.899 ENCOUNTER FOR LONG-TERM (CURRENT) USE OF MEDICATIONS: ICD-10-CM

## 2024-05-07 DIAGNOSIS — M47.819 SPONDYLOARTHRITIS: ICD-10-CM

## 2024-05-07 DIAGNOSIS — L40.50 PSORIATIC ARTHRITIS (MULTI): Primary | ICD-10-CM

## 2024-05-07 DIAGNOSIS — Z78.0 POST-MENOPAUSAL: ICD-10-CM

## 2024-05-07 DIAGNOSIS — E55.9 VITAMIN D DEFICIENCY: ICD-10-CM

## 2024-05-07 DIAGNOSIS — M06.09 POLYARTHRITIS WITH NEGATIVE RHEUMATOID FACTOR (MULTI): ICD-10-CM

## 2024-05-07 PROBLEM — Z85.850 HISTORY OF MALIGNANT NEOPLASM OF THYROID: Status: ACTIVE | Noted: 2024-05-07

## 2024-05-07 PROBLEM — Z86.39 HISTORY OF UNDERACTIVE THYROID: Status: ACTIVE | Noted: 2024-05-03

## 2024-05-07 PROBLEM — H25.9 AGE-RELATED CATARACT: Status: ACTIVE | Noted: 2021-08-06

## 2024-05-07 PROBLEM — Z86.2 HISTORY OF IMMUNE DISORDER: Status: ACTIVE | Noted: 2024-05-07

## 2024-05-07 PROBLEM — H35.89: Status: ACTIVE | Noted: 2021-08-06

## 2024-05-07 PROBLEM — E89.0 POSTOPERATIVE HYPOTHYROIDISM: Status: ACTIVE | Noted: 2024-05-07

## 2024-05-07 PROBLEM — H52.00 HYPEROPIA: Status: ACTIVE | Noted: 2021-08-06

## 2024-05-07 PROBLEM — Z86.79 HISTORY OF HYPERTENSION: Status: ACTIVE | Noted: 2024-05-07

## 2024-05-07 PROBLEM — H04.129 DRY EYE SYNDROME: Status: ACTIVE | Noted: 2021-08-06

## 2024-05-07 PROBLEM — R13.10 DYSPHAGIA: Status: ACTIVE | Noted: 2024-05-07

## 2024-05-07 PROCEDURE — 3074F SYST BP LT 130 MM HG: CPT | Performed by: INTERNAL MEDICINE

## 2024-05-07 PROCEDURE — 99213 OFFICE O/P EST LOW 20 MIN: CPT | Performed by: INTERNAL MEDICINE

## 2024-05-07 PROCEDURE — 3079F DIAST BP 80-89 MM HG: CPT | Performed by: INTERNAL MEDICINE

## 2024-05-07 RX ORDER — ERGOCALCIFEROL 1.25 MG/1
50000 CAPSULE ORAL
Qty: 12 CAPSULE | Refills: 1 | Status: SHIPPED | OUTPATIENT
Start: 2024-05-12 | End: 2024-08-10

## 2024-05-07 RX ORDER — AMLODIPINE BESYLATE 5 MG/1
5 TABLET ORAL DAILY
COMMUNITY
Start: 2024-05-03

## 2024-05-07 RX ORDER — LEVOTHYROXINE SODIUM 100 UG/1
100 TABLET ORAL
COMMUNITY
Start: 2024-05-03

## 2024-05-07 ASSESSMENT — ROUTINE ASSESSMENT OF PATIENT INDEX DATA (RAPID3)
WASH_DRY_BODY: WITHOUT ANY DIFFICULTY
PARTIPATE_RECREATIONAL_ACTIVITIES: UNABLE TO DO
ON A SCALE OF ONE TO TEN, CONSIDERING ALL THE WAYS IN WHICH ILLNESS AND HEALTH CONDITIONS MAY AFFECT YOU AT THIS TIME, PLEASE INDICATE BELOW HOW YOU ARE DOING:: 4
FN_SCORE: 2.3
DRESS_YOURSELF: WITHOUT ANY DIFFICULTY
TURN_FAUCETS_OFF: WITHOUT ANY DIFFICULTY
LIFT_CUP_TO_MOUTH: WITHOUT ANY DIFFICULTY
WALK_KILOMETERS: UNABLE TO DO
SUM OF QUESTIONS A TO J: 7
IN_OUT_BED: WITHOUT ANY DIFFICULTY
ON A SCALE OF ONE TO TEN, HOW MUCH PAIN HAVE YOU HAD BECAUSE OF YOUR CONDITION OVER THE PAST WEEK?: 3
TOTAL RAPID3 SCORE: 9.3
FEELINGS_ANXIETY_NERVOUS: WITH SOME DIFFICULTY
WEIGHTED_TOTAL_SCORE: 3.1
ON A SCALE OF ONE TO TEN, CONSIDERING ALL THE WAYS IN WHICH ILLNESS AND HEALTH CONDITIONS MAY AFFECT YOU AT THIS TIME, PLEASE INDICATE BELOW HOW YOU ARE DOING:: 4
ON A SCALE OF ONE TO TEN, HOW MUCH PAIN HAVE YOU HAD BECAUSE OF YOUR CONDITION OVER THE PAST WEEK?: 3
SEVERITY_SCORE: MODERATE SEVERITY (MS)
SEVERITY_SCORE: 0
GOOD_NIGHTS_SLEEP: WITH SOME DIFFICULTY
PICK_CLOTHES_OFF_FLOOR: WITH SOME DIFFICULTY
FEELINGS_DEPRESSION: WITH SOME DIFFICULTY
WALK_FLAT_GROUND: WITHOUT ANY DIFFICULTY
IN_OUT_TRANSPORT: WITHOUT ANY DIFFICULTY

## 2024-05-07 ASSESSMENT — PAIN SCALES - GENERAL: PAINLEVEL: 5

## 2024-05-07 ASSESSMENT — ENCOUNTER SYMPTOMS
LOSS OF SENSATION IN FEET: 0
DEPRESSION: 0
OCCASIONAL FEELINGS OF UNSTEADINESS: 0

## 2024-05-07 NOTE — PROGRESS NOTES
[facility]/  Rheumatology  [address], [address]  [place] [address]  Phone: [phone]  Fax: [phone]    Rheumatology Progress Note 5/7/24    Winter Feliz is a 53 y.o. female here for   Chief Complaint   Patient presents with    5 MONTH fuv WITH LABS       Last Visit: 12/8/23    Rheum Hx    Features of SpA with elbow enthesitis/medial epicondylitis, Achillis  tendonitis, sacroiliac tenderness, possible psoriasis versus seborrheic  dermatitis, in addition to OA  Responds well to nabumetone  Noted labs before showed mild transaminitis deemed likely fatty liver  Referred by Dr Campbell for evaluate and treat- generalized OA.  Chief complaint: Arthritis-osteoarthritis  Since 6 months ago  Slow onset  Progressive remitting course  Precipitating factors: Walking, housework, caregiver job  Associated symptoms: Stiffness, swelling  Better: Sitting  Worse: Activity  Previous treatments:  Naproxen-somewhat helpful  Ibuprofen-somewhat helpful  Tylenol- no help  Nabumetone-very helpful  Occupation: Caregiver  Currently tenderness of bilateral shoulders, elbows, lower back,  bilateral hands, bilateral knees, bilateral ankles  Swelling of bilateral ankles  5-10 minutes morning stiffness  Review of systems positive for:  Fatigue  Scalp tenderness/dandruff  Dry eyes dry mouth- Restasis no help and burned  Swelling of the legs by the end of the day  History of asthma- well controlled  Gastroesophageal reflux disease- controlled w PPI/IBS  Thyroid cancer 2007 s/p resection and radiotherapy  Rashes- around face on forehead- itchy, scaly, red  Joint pain swelling stiffness  Back pain that wakes her up from sleep and improves upon  getting up- elvira SI area and in betwee shoulder blades; hx of sciatica;  no hx of trauma  Muscle aches  Weakness arms legs sometimes probably due to pain  Thyroid disease  Last menstrual period 2007- endometrial ablation hx; DXA yrs  ok  Allergies  Depression  anxiety  Sleep disturbance- has IGGY and just had Inspire implant- not  on yet.  Component      Latest Ref Rng 9/23/2021 5/30/2023   DEEPIKA Negative…     DEEPIKA Titer Negative…     DEEPIKA Pattern (Note)…     SSA ANTIBODIES <0.2…     SSB ANTIBODIES <0.2…     Angiotensin 1 Conv 27…     HLA-B27 Dna Result Negative…     Citrulline Antibody, IgG <15…     Rheumatoid Factor      0 - 20 IU/ML 10     RNP Antibody <0.2…     Thyroglobulin Abs  <0.9…          Previous Tx  Naproxen-somewhat helpful  Ibuprofen-somewhat helpful  Tylenol- no help  Nabumetone-very helpful  Methotrexate- discontinued due to GI upset  SSZ- discontinued due to lack of efficacy  Otezla- discontinued due to lack of efficacy    Health Maintenance  DXA-  T -0.7 (Hip; 11/2021)  Malignancy Hx- thyroid cancer, s/p removal  Component      Latest Ref Rng 9/23/2021   HIV 1/2 Antigen/Antibody Screen with Reflex to Confirmation Non Reactive…    HIV 1/2 Antibody Confirmation Test Not Indicated    Hiv Interpretation Negative…    Hepatitis B Surface AG      NEG  NEGATIVE    Hepatitis C AB Negative…      Component      Latest Ref Rng 10/5/2021   TB Result Negative…      Immunization History   Administered Date(s) Administered    Flu vaccine (IIV4), preservative free *Check age/dose* 09/25/2016, 09/14/2020, 10/12/2021, 10/09/2022, 10/20/2023    Hepatitis B vaccine, adult (RECOMBIVAX, ENGERIX) 08/10/2011    Influenza Whole 09/13/2013    Influenza, Unspecified 09/22/2011    Influenza, injectable, MDCK, quadrivalent 09/25/2018    Influenza, injectable, quadrivalent 09/11/2017, 11/12/2019, 11/09/2020    Influenza, seasonal, injectable 10/11/2015, 10/12/2016    Influenza, seasonal, injectable, preservative free 10/11/2015    Novel influenza-H1N1-09, preservative-free 12/30/2009    Pfizer COVID-19 vaccine, Fall 2023, 12 years and older, (30mcg/0.3mL) 10/20/2023    Pfizer COVID-19 vaccine, bivalent, age 12 years and older (30 mcg/0.3 mL) 10/09/2022    Pfizer Purple Cap SARS-CoV-2  2021, 2021, 2021, 12/10/2021    Pneumococcal conjugate vaccine, 13-valent (PREVNAR 13) 2022    Pneumococcal polysaccharide vaccine, 23-valent, age 2 years and older (PNEUMOVAX 23) 2017    Td vaccine, age 7 years and older (TDVAX) 2009    Tdap vaccine, age 7 year and older (BOOSTRIX, ADACEL) 2024    Zoster vaccine, recombinant, adult (SHINGRIX) 10/20/2023, 2024          Past Medical History:   Diagnosis Date    Asthma ([facility])     Body mass index (BMI) 33.0-33.9, adult 2022    BMI 33.0-33.9,adult    Cyst of right kidney     resolved per last U/S    Dyslipidemia     Gallstones     GERD (gastroesophageal reflux disease)     History of dysphagia     since thyroidectomy    Hypertension     IGGY (obstructive sleep apnea)     Psoriasis     Psoriatic arthritis (Multi)     Spondyloarthritis     Thyroid cancer (Multi)       Past Surgical History:   Procedure Laterality Date     SECTION, CLASSIC  2016     Section    FOOT SURGERY      cyst removed from L  foot between 1st and 2nd MTP    HAND SURGERY Right     R  cyst removal    OTHER SURGICAL HISTORY      hypoglossal nerve stimulator- INSPIRE    TOTAL THYROIDECTOMY      first partial, then total      Current Outpatient Medications   Medication Sig Dispense Refill    albuterol 2.5 mg /3 mL (0.083 %) nebulizer solution Inhale.      amLODIPine (Norvasc) 5 mg tablet Take 1 tablet (5 mg) by mouth once daily.      ascorbic acid (Vitamin C) 1,000 mg tablet Take 1 tablet (1,000 mg) by mouth once daily.      Breo Ellipta 200-25 mcg/dose inhaler INHALE 1 PUFF ONCE A DAY 90      buPROPion XL (Wellbutrin XL) 150 mg 24 hr tablet Take 1 tablet (150 mg) by mouth once daily.      jessica-vit D3-mag-K2-min-herb 326 (Algae Based Calcium) 333.33 mg-6.67 mcg-32 mg tablet Take by mouth.      chlorthalidone (Hygroton) 25 mg tablet TAKE 1 TABLET BY MOUTH EVERY DAY IN THE MORNING WITH FOOD 90 tablet 3    cholecalciferol  (Vitamin D-3) 50 mcg (2,000 unit) capsule Take by mouth.      citalopram (CeleXA) 40 mg tablet Take 1 tablet (40 mg) by mouth once daily.      elderberry fruit and flower 460-115 mg capsule Take by mouth.      ergocalciferol (Vitamin D-2) 1.25 MG (64968 UT) capsule Take 1 capsule (50,000 Units) by mouth 1 (one) time per week. 12 capsule 1    etanercept (Enbrel) 50 mg/mL (1 mL) injection Inject 1 mL (50 mg) under the skin 1 (one) time per week. 12 mL 3    ezetimibe (Zetia) 10 mg tablet TAKE 1 TABLET BY MOUTH EVERY DAY 90 tablet 1    folic acid (Folvite) 1 mg tablet TAKE 1 TABLET ORALLY ONCE A DAY EXCEPT THE DAY OF METHOTREXATE 90 DAYS 90 tablet 3    garlic 1,000 mg capsule Take by mouth.      mv-min/iron/folic/calcium/vitK (WOMEN'S MULTIVITAMIN ORAL) Take by mouth.      pantoprazole (ProtoNix) 40 mg EC tablet Take 1 tablet (40 mg) by mouth 2 times a day.      rosuvastatin (Crestor) 40 mg tablet Take 1 tablet (40 mg) by mouth once daily. 90 tablet 3    Synthroid 100 mcg tablet Take 1 tablet (100 mcg) by mouth once daily in the morning. Take before meals.       No current facility-administered medications for this visit.      Allergies   Allergen Reactions    Lipitor [Atorvastatin] Other     Muscle aches    Methotrexate GI Upset    Iodine Other     vomiting        Visit Vitals  /82 (BP Location: Right arm, Patient Position: Sitting)   Wt 79.7 kg (175 lb 12.8 oz)   BMI 30.18 kg/m²   Smoking Status Never   BSA 1.9 m²            Rapid 3  Function Score (FN): 2.3  Pain Score (PN) (0-10): 3  Patient Global (PTGL) (0-10): 4  Rapid3 Score: 9.3  RAPID3 Weighted Score: 3.1       Workup        Component      Latest Ref Rn 4/25/2024   LEUKOCYTES (10*3/UL) IN BLOOD BY AUTOMATED COUNT, YUNG      4.4 - 11.3 x10*3/uL 7.3    nRBC      0.0 - 0.0 /100 WBCs 0.0    ERYTHROCYTES (10*6/UL) IN BLOOD BY AUTOMATED COUNT, YUNG      4.00 - 5.20 x10*6/uL 4.78    HEMOGLOBIN      12.0 - 16.0 g/dL 12.8    HEMATOCRIT      36.0 - 46.0 %  40.1    MCV      80 - 100 fL 84    MCH      26.0 - 34.0 pg 26.8    MCHC      32.0 - 36.0 g/dL 31.9 (L)    RED CELL DISTRIBUTION WIDTH      11.5 - 14.5 % 14.3    PLATELETS (10*3/UL) IN BLOOD AUTOMATED COUNT, [facility]      150 - 450 x10*3/uL 340    NEUTROPHILS/100 LEUKOCYTES IN BLOOD BY AUTOMATED COUNT, [facility]      40.0 - 80.0 % 44.7    Immature Granulocytes %, Automated      0.0 - 0.9 % 0.3    Lymphocytes %      13.0 - 44.0 % 44.4    Monocytes %      2.0 - 10.0 % 6.5    Eosinophils %      0.0 - 6.0 % 3.0    Basophils %      0.0 - 2.0 % 1.1    NEUTROPHILS (10*3/UL) IN BLOOD BY AUTOMATED COUNT, [facility]      1.20 - 7.70 x10*3/uL 3.26    Immature Granulocytes Absolute, Automated      0.00 - 0.70 x10*3/uL 0.02    Lymphocytes Absolute      1.20 - 4.80 x10*3/uL 3.23    Monocytes Absolute      0.10 - 1.00 x10*3/uL 0.47    Eosinophils Absolute      0.00 - 0.70 x10*3/uL 0.22    Basophils Absolute      0.00 - 0.10 x10*3/uL 0.08    GLUCOSE      65 - 99 mg/dL 94    SODIUM      133 - 145 mmol/L 141    POTASSIUM      3.4 - 5.1 mmol/L 3.9    CHLORIDE      97 - 107 mmol/L 101    Bicarbonate      24 - 31 mmol/L 29    Blood Urea Nitrogen      8 - 25 mg/dL 15    Creatinine      0.40 - 1.60 mg/dL 0.80    EGFR      >60 mL/min/1.73m*2 87    Calcium      8.5 - 10.4 mg/dL 9.2    Albumin      3.5 - 5.0 g/dL 4.6    Alkaline Phosphatase      35 - 125 U/L 60    Total Protein      5.9 - 7.9 g/dL 6.9    AST      5 - 40 U/L 24    Bilirubin Total      0.1 - 1.2 mg/dL 0.7    ALT      5 - 40 U/L 23    Anion Gap      <=19 mmol/L 11    Thyroid Stimulating Hormone      0.27 - 4.20 mIU/L 37.37 (H)    C-Reactive Protein      0.00 - 2.00 mg/dL <0.30    Creatine Kinase      24 - 195 U/L 320 (H)    Sed Rate      0 - 30 mm/h 8    Scan Result Vectra 33 (moderate)   Vitamin D, 25-Hydroxy, Total      31 - 100 ng/mL 28 (L)    Extra Tube Hold for add-ons.         Assessment/Plan  1. Psoriatic arthritis (Multi)    2. Vitamin D deficiency    3. Psoriatic  arthropathy (Multi)    4. Spondyloarthritis    5. 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 Encounter for long-term (current) use of medications    7. Psoriasis    8. Post-menopausal       Orders Placed This Encounter   Procedures    XR DEXA bone density axial skeleton w VFA    CBC and Auto Differential    Comprehensive Metabolic Panel    C-Reactive Protein    Creatine Kinase    Sedimentation Rate    Urinalysis with Reflex Culture and Microscopic    Vitamin D 25-Hydroxy,Total (for eval of Vitamin D levels)    Vectra; [facility]; [id number] - Miscellaneous Test      Features of SpA/PsA with elbow enthesitis/med epicondylitis, Achilles tendonitis, SI tenderness, and psoraisis.  Responds well to nabumetone  Fatty liver with mild transaminitis- monitor  Mildly elevated Hg likely due to IGGY    Since last appt, adherent and tolerating Enbrel  Denies any recent or current infection.  Not on any NSAIDs or glucocorticoids.  ROS+ for fatigue, sicca, joint pain  Rapid 3 consistent with low severity  Labs reviewed  D/w pt tx options and decided on   Continue Enbrel  Replete vit D  Will be resuming thyroid med today.   Advised of possible side effects and importance of monitoring.   All questions answered.  Patient to follow up with primary care provider regarding all other medical issues not addressed today and for medical chart updating.     Camille Monson MD      Patient Care Team:  Cristina Vázquez MD as PCP - General  Camille Monson MD as Consulting Physician (Rheumatology)

## 2024-05-17 ENCOUNTER — HOSPITAL ENCOUNTER (OUTPATIENT)
Dept: RADIOLOGY | Facility: CLINIC | Age: 54
Discharge: HOME | End: 2024-05-17
Payer: COMMERCIAL

## 2024-05-17 ENCOUNTER — APPOINTMENT (OUTPATIENT)
Dept: OBSTETRICS AND GYNECOLOGY | Facility: CLINIC | Age: 54
End: 2024-05-17
Payer: COMMERCIAL

## 2024-05-17 DIAGNOSIS — Z78.0 POST-MENOPAUSAL: ICD-10-CM

## 2024-05-17 PROCEDURE — 77085 DXA BONE DENSITY AXL VRT FX: CPT | Performed by: RADIOLOGY

## 2024-05-17 PROCEDURE — 77085 DXA BONE DENSITY AXL VRT FX: CPT

## 2024-06-14 ENCOUNTER — OFFICE VISIT (OUTPATIENT)
Dept: OBSTETRICS AND GYNECOLOGY | Facility: CLINIC | Age: 54
End: 2024-06-14
Payer: COMMERCIAL

## 2024-06-14 VITALS
BODY MASS INDEX: 29.74 KG/M2 | SYSTOLIC BLOOD PRESSURE: 125 MMHG | HEIGHT: 64 IN | DIASTOLIC BLOOD PRESSURE: 73 MMHG | WEIGHT: 174.2 LBS

## 2024-06-14 DIAGNOSIS — Z12.31 ENCOUNTER FOR SCREENING MAMMOGRAM FOR MALIGNANT NEOPLASM OF BREAST: ICD-10-CM

## 2024-06-14 DIAGNOSIS — Z01.419 ENCOUNTER FOR ANNUAL ROUTINE GYNECOLOGICAL EXAMINATION: Primary | ICD-10-CM

## 2024-06-14 PROCEDURE — 1036F TOBACCO NON-USER: CPT

## 2024-06-14 PROCEDURE — 3078F DIAST BP <80 MM HG: CPT

## 2024-06-14 PROCEDURE — 3074F SYST BP LT 130 MM HG: CPT

## 2024-06-14 PROCEDURE — 99396 PREV VISIT EST AGE 40-64: CPT

## 2024-06-14 ASSESSMENT — ENCOUNTER SYMPTOMS
HEADACHES: 0
COLOR CHANGE: 0
DEPRESSION: 0
COUGH: 0
UNEXPECTED WEIGHT CHANGE: 0
OCCASIONAL FEELINGS OF UNSTEADINESS: 0
ABDOMINAL PAIN: 0
VOMITING: 0
DYSURIA: 0
DIZZINESS: 0
FEVER: 0
SHORTNESS OF BREATH: 0
LOSS OF SENSATION IN FEET: 0
FATIGUE: 0
NAUSEA: 0
CHILLS: 0

## 2024-06-14 ASSESSMENT — LIFESTYLE VARIABLES
HOW MANY STANDARD DRINKS CONTAINING ALCOHOL DO YOU HAVE ON A TYPICAL DAY: PATIENT DOES NOT DRINK
SKIP TO QUESTIONS 9-10: 1
HOW OFTEN DO YOU HAVE SIX OR MORE DRINKS ON ONE OCCASION: NEVER
AUDIT-C TOTAL SCORE: 0
HOW OFTEN DO YOU HAVE A DRINK CONTAINING ALCOHOL: NEVER

## 2024-06-14 ASSESSMENT — PATIENT HEALTH QUESTIONNAIRE - PHQ9
1. LITTLE INTEREST OR PLEASURE IN DOING THINGS: NOT AT ALL
2. FEELING DOWN, DEPRESSED OR HOPELESS: NOT AT ALL
SUM OF ALL RESPONSES TO PHQ9 QUESTIONS 1 & 2: 0

## 2024-06-14 ASSESSMENT — PAIN SCALES - GENERAL: PAINLEVEL: 0-NO PAIN

## 2024-06-14 NOTE — PROGRESS NOTES
"Leah Feliz is a 53 y.o. female who is here for a routine GYN exam. Last saw Dr. Desai 2023. Hx of endometrial ablation. Denies vaginal bleeding. Denies pelvic pain, pressure, or bloating. Denies breast changes or concerns. Denies hematochezia, rectal bleeding, or bowel changes.      Complaints:   none  Periods: ablation  History of abnormal Pap smear: no  History of abnormal mammogram: no      OB History          4    Para   3    Term                AB        Living             SAB        IAB        Ectopic        Multiple        Live Births                      Review of Systems   Constitutional:  Negative for chills, fatigue, fever and unexpected weight change.   Respiratory:  Negative for cough and shortness of breath.    Gastrointestinal:  Negative for abdominal pain, nausea and vomiting.   Genitourinary:  Negative for dyspareunia, dysuria, pelvic pain and vaginal discharge.   Skin:  Negative for color change and rash.   Neurological:  Negative for dizziness and headaches.       Objective   /73   Ht 1.626 m (5' 4\")   Wt 79 kg (174 lb 3.2 oz)   BMI 29.90 kg/m²        General:   Alert and oriented, in no acute distress   Neck: Supple. No visible thyromegaly.    Breast/Axilla: Normal to palpation bilaterally without masses, skin changes, or nipple discharge.    Abdomen: Soft, non-tender, without masses or organomegaly   Vulva: Normal architecture without erythema, masses, or lesions.    Vagina: Normal mucosa without lesions, masses; positive atrophy; XS speculum utilized, constriction at introitus and throughout canal with speculum use    Cervix: Normal without masses, lesions, or signs of cervicitis   Uterus: Normal, mobile, non-enlarged uterus   Adnexa: Normal without masses or lesions   Pelvic Floor Normal    Psych Normal affect. Normal mood.      Assessment/Plan   -UTD on pap smear, next due 2026.  -Due for mammogram, ordered. Hx of elevated TC (fam hx breast cancer in " mom) but cannot get MRIs due to Inspire implant.  -UTD on colonoscopy, 2023; q5 years.    Ankita Bullard PA-C

## 2024-06-21 ENCOUNTER — APPOINTMENT (OUTPATIENT)
Dept: RADIOLOGY | Facility: CLINIC | Age: 54
End: 2024-06-21
Payer: COMMERCIAL

## 2024-07-08 ENCOUNTER — HOSPITAL ENCOUNTER (OUTPATIENT)
Dept: RADIOLOGY | Facility: CLINIC | Age: 54
Discharge: HOME | End: 2024-07-08
Payer: COMMERCIAL

## 2024-07-08 VITALS — BODY MASS INDEX: 29.53 KG/M2 | HEIGHT: 64 IN | WEIGHT: 173 LBS

## 2024-07-08 DIAGNOSIS — Z12.31 ENCOUNTER FOR SCREENING MAMMOGRAM FOR MALIGNANT NEOPLASM OF BREAST: ICD-10-CM

## 2024-07-08 PROCEDURE — 77067 SCR MAMMO BI INCL CAD: CPT | Performed by: RADIOLOGY

## 2024-07-08 PROCEDURE — 77063 BREAST TOMOSYNTHESIS BI: CPT | Performed by: RADIOLOGY

## 2024-07-08 PROCEDURE — 77067 SCR MAMMO BI INCL CAD: CPT

## 2024-07-10 ENCOUNTER — APPOINTMENT (OUTPATIENT)
Dept: RADIOLOGY | Facility: CLINIC | Age: 54
End: 2024-07-10
Payer: COMMERCIAL

## 2024-07-16 DIAGNOSIS — E78.5 DYSLIPIDEMIA: ICD-10-CM

## 2024-07-17 RX ORDER — EZETIMIBE 10 MG/1
10 TABLET ORAL DAILY
Qty: 90 TABLET | Refills: 1 | OUTPATIENT
Start: 2024-07-17

## 2024-07-17 NOTE — TELEPHONE ENCOUNTER
Appears has not been seen by PCP in 1 year. Dr Vázquez is no longer seeing patients. Will need to establish with new PCP. [facility] is one option. [phone]. If already established with new PCP this refill should be directed to them.

## 2024-08-18 DIAGNOSIS — E55.9 VITAMIN D DEFICIENCY: ICD-10-CM

## 2024-08-21 RX ORDER — ERGOCALCIFEROL 1.25 MG/1
50000 CAPSULE ORAL
Qty: 12 CAPSULE | Refills: 1 | Status: SHIPPED | OUTPATIENT
Start: 2024-08-25

## 2024-09-06 ENCOUNTER — APPOINTMENT (OUTPATIENT)
Dept: SLEEP MEDICINE | Facility: CLINIC | Age: 54
End: 2024-09-06
Payer: COMMERCIAL

## 2024-10-11 ENCOUNTER — APPOINTMENT (OUTPATIENT)
Dept: SLEEP MEDICINE | Facility: CLINIC | Age: 54
End: 2024-10-11
Payer: COMMERCIAL

## 2024-10-25 ENCOUNTER — APPOINTMENT (OUTPATIENT)
Dept: RHEUMATOLOGY | Facility: CLINIC | Age: 54
End: 2024-10-25
Payer: COMMERCIAL

## 2024-11-03 DIAGNOSIS — E78.2 MIXED HYPERLIPIDEMIA: ICD-10-CM

## 2024-11-03 DIAGNOSIS — M06.09 RHEUMATOID ARTHRITIS WITHOUT RHEUMATOID FACTOR, MULTIPLE SITES (MULTI): ICD-10-CM

## 2024-11-04 RX ORDER — ROSUVASTATIN CALCIUM 40 MG/1
40 TABLET, COATED ORAL DAILY
Qty: 90 TABLET | Refills: 0 | Status: SHIPPED | OUTPATIENT
Start: 2024-11-04

## 2024-11-04 RX ORDER — FOLIC ACID 1 MG/1
TABLET ORAL
Qty: 90 TABLET | Refills: 3 | Status: SHIPPED | OUTPATIENT
Start: 2024-11-04

## 2024-11-15 ENCOUNTER — APPOINTMENT (OUTPATIENT)
Dept: SLEEP MEDICINE | Facility: CLINIC | Age: 54
End: 2024-11-15
Payer: COMMERCIAL

## 2024-11-15 VITALS
TEMPERATURE: 98.5 F | HEIGHT: 64 IN | HEART RATE: 80 BPM | BODY MASS INDEX: 31.65 KG/M2 | WEIGHT: 185.4 LBS | DIASTOLIC BLOOD PRESSURE: 85 MMHG | SYSTOLIC BLOOD PRESSURE: 142 MMHG

## 2024-11-15 DIAGNOSIS — Z86.79 HISTORY OF HYPERTENSION: ICD-10-CM

## 2024-11-15 DIAGNOSIS — I10 BENIGN HYPERTENSION: ICD-10-CM

## 2024-11-15 DIAGNOSIS — Z72.821 INADEQUATE SLEEP HYGIENE: Primary | ICD-10-CM

## 2024-11-15 DIAGNOSIS — G47.33 OBSTRUCTIVE SLEEP APNEA, ADULT: ICD-10-CM

## 2024-11-15 PROCEDURE — 3077F SYST BP >= 140 MM HG: CPT | Performed by: STUDENT IN AN ORGANIZED HEALTH CARE EDUCATION/TRAINING PROGRAM

## 2024-11-15 PROCEDURE — 95977 ALYS CPLX CN NPGT PRGRMG: CPT | Performed by: STUDENT IN AN ORGANIZED HEALTH CARE EDUCATION/TRAINING PROGRAM

## 2024-11-15 PROCEDURE — 3079F DIAST BP 80-89 MM HG: CPT | Performed by: STUDENT IN AN ORGANIZED HEALTH CARE EDUCATION/TRAINING PROGRAM

## 2024-11-15 PROCEDURE — 3008F BODY MASS INDEX DOCD: CPT | Performed by: STUDENT IN AN ORGANIZED HEALTH CARE EDUCATION/TRAINING PROGRAM

## 2024-11-15 PROCEDURE — 99214 OFFICE O/P EST MOD 30 MIN: CPT | Performed by: STUDENT IN AN ORGANIZED HEALTH CARE EDUCATION/TRAINING PROGRAM

## 2024-11-15 ASSESSMENT — SLEEP AND FATIGUE QUESTIONNAIRES
SITING INACTIVE IN A PUBLIC PLACE LIKE A CLASS ROOM OR A MOVIE THEATER: SLIGHT CHANCE OF DOZING
HOW LIKELY ARE YOU TO NOD OFF OR FALL ASLEEP WHEN YOU ARE A PASSENGER IN A CAR FOR AN HOUR WITHOUT A BREAK: HIGH CHANCE OF DOZING
HOW LIKELY ARE YOU TO NOD OFF OR FALL ASLEEP WHILE SITTING AND TALKING TO SOMEONE: WOULD NEVER DOZE
SATISFACTION_WITH_CURRENT_SLEEP_PATTERN: SATISFIED
SLEEP_PROBLEM_INTERFERES_DAILY_ACTIVITIES: A LITTLE
WORRIED_DISTRESSED_DUE_TO_SLEEP: NOT AT ALL NOTICEABLE
HOW LIKELY ARE YOU TO NOD OFF OR FALL ASLEEP WHILE SITTING AND READING: MODERATE CHANCE OF DOZING
HOW LIKELY ARE YOU TO NOD OFF OR FALL ASLEEP WHILE WATCHING TV: MODERATE CHANCE OF DOZING
SLEEP_PROBLEM_NOTICEABLE_TO_OTHERS: NOT AT ALL NOTICEABLE
HOW LIKELY ARE YOU TO NOD OFF OR FALL ASLEEP IN A CAR, WHILE STOPPED FOR A FEW MINUTES IN TRAFFIC: WOULD NEVER DOZE
HOW LIKELY ARE YOU TO NOD OFF OR FALL ASLEEP WHILE LYING DOWN TO REST IN THE AFTERNOON WHEN CIRCUMSTANCES PERMIT: HIGH CHANCE OF DOZING
HOW LIKELY ARE YOU TO NOD OFF OR FALL ASLEEP WHILE SITTING QUIETLY AFTER LUNCH WITHOUT ALCOHOL: SLIGHT CHANCE OF DOZING
ESS-CHAD TOTAL SCORE: 12
DIFFICULTY_STAYING_ASLEEP: MILD

## 2024-11-15 ASSESSMENT — ENCOUNTER SYMPTOMS
PSYCHIATRIC NEGATIVE: 1
RESPIRATORY NEGATIVE: 1
NEUROLOGICAL NEGATIVE: 1
CONSTITUTIONAL NEGATIVE: 1
CARDIOVASCULAR NEGATIVE: 1

## 2024-11-15 NOTE — PROGRESS NOTES
Patient: Winter Feliz    [id number]  : 1970 -- AGE 53 y.o.    Provider: Sarthak Birmingham MD     Location [facility]   Service Date: 2024              [facility]  Followup Visit Note        ASSESSMENT/PLAN     Ms. Feliz is a 53 y.o. female and she returns in followup to the [facility] for the problems listed below on 24     Problem List, Orders, Assessment, Recommendations:  Problem List Items Addressed This Visit             ICD-10-CM    Obstructive sleep apnea, adult G47.33     Last visit in 2023:  Incoming settings: Usage: 32hrs/wk, Amplitude: 2.6V, Range: 2.3V - 2.9V, Configuration: +-+  HSAT while on this voltage showed uncontrolled IGGY, and I advised patient to step up on voltage.  However, she reports that high level caused her ear pain  We trials different electrode configuration today and she did well.   - Functional Threshold:  (Default) At activation FT: 0.9V; At visit on 22: FT: 1.8V; today 2023: 0.8v   Outgoing settings: Amplitude: 1.2V, Range: 1.0V - 2.0V, Configuration: 0-0, Start Delay: 60min (increased), Pause: 15min, Duration: 8hrs  Patient unable to tolerate levels where previous sleep study showed TA. Configuration changed with good tongue protrusion noted and patient confirming comfort.  - she is instructed to titrate her voltage level based on symptoms such as nocturnal awakening and snoring  - encouraged increase daily exercise    Visit on 3/8/2024  Usage: 6.25 hr/night  Incoming settings: confi-0, 1.0-2.0 @ 1.4v; start delay 60 min, pause 15 min, duration 8 hr  Outgoing settings: confi-0, 1.0-1.5 @ 1.4v; start delay 50 min, pause 20 min, duration 8 hr    If she continues to do well with the therapy and able to show consistency in the next 3-4 months, we will re-evaluate and repeat HSAT testing at follow-up visit    Analysis on 11/15/2024  Winter is her with her special needs  daughter (Winter is frequently up at night with her daughter attending to her need which can create significant disruptions in sleep maintenance reflected in usage hours)  Winter also states that she was sick and was unable to use therapy for several days and when she re-instituted therapy it was too strong so she decreased amplitude.  Incoming settings: Electrode configuration: 0 - 0; PW: 90; Hz: 33  Amplitude: 1.1V (1.0 to 1.5); Usage: 18 hrs/wk  ST: 0.3V; FT: 0.5V (Probably a little more than FT)  Impedances at 1.5V: Normal; Waveform at 0.8V: Normal  Outgoing settings:  Amplitude: 0.8V (0.7 to 1.3)  Plan: focus on increasing usage vs increasing amplitude.  F/U 6 months           Relevant Orders    Follow Up In Adult Sleep Medicine    Benign hypertension I10     BP Readings from Last 1 Encounters:   11/15/24 142/85     - doing well, asymptomatic  - discussed at length the impact of untreated IGGY and BP control  - continue current management and follow-up with PCP          Inadequate sleep hygiene - Primary Z72.821     Better now  And no problem falling asleep         RESOLVED: History of hypertension Z86.79     Disposition  Return to clinic in 3 months       HISTORY OF PRESENT ILLNESS     HISTORY OF PRESENT ILLNESS   Winter Feliz is a 53 y.o. female with h/o Hypertension, IGGY, and Obesity who presents to a [facility] for followup.     Assessment and plan from last visit: 3/8/2024    Ms. Feliz is a 53 y.o. female and she returns in followup to the [facility] for IGGY.     Problem List, Orders, Assessment, Recommendations:  Problem List Items Addressed This Visit               ICD-10-CM     Obstructive sleep apnea, adult - Primary G47.33       Last visit in 12/2023:  Incoming settings: Usage: 32hrs/wk, Amplitude: 2.6V, Range: 2.3V - 2.9V, Configuration: +-+  HSAT while on this voltage showed uncontrolled IGGY, and I advised patient to step up on voltage.   However, she reports that high level caused her ear pain  We trials different electrode configuration today and she did well.   - Functional Threshold:  (Default) At activation FT: 0.9V; At visit on 22: FT: 1.8V; today 2023: 0.8v   Outgoing settings: Amplitude: 1.2V, Range: 1.0V - 2.0V, Configuration: 0-0, Start Delay: 60min (increased), Pause: 15min, Duration: 8hrs  Patient unable to tolerate levels where previous sleep study showed TA. Configuration changed with good tongue protrusion noted and patient confirming comfort.  - she is instructed to titrate her voltage level based on symptoms such as nocturnal awakening and snoring  - encouraged increase daily exercise     Today's visit: 3/8/2024  Usage: 6.25 hr/night  Incoming settings: confi-0, 1.0-2.0 @ 1.4v; start delay 60 min, pause 15 min, duration 8 hr  Outgoing settings: confi-0, 1.0-1.5 @ 1.4v; start delay 50 min, pause 20 min, duration 8 hr     If she continues to do well with the therapy and able to show consistency in the next 3-4 months, we will re-evaluate and repeat HSAT testing at follow-up visit              Relevant Orders     Follow Up In Adult Sleep Medicine     Benign hypertension I10           BP Readings from Last 1 Encounters:   24 145/78   - doing well, asymptomatic  - discussed at length the impact of untreated IGGY and BP control  - continue current management and follow-up with PCP            Inadequate sleep hygiene Z72.821       Better now  Continue practicing good sleep hygiene            Disposition     Return to clinic in 4-5 months    Current History    On today's visit, the patient reports that she was sick and couldn't really use the therapy and once she wanted to start again, she had to turn it down couple levels.  She was sent out on 1.4v and now even 1.1v was hard to tolerate.  She has not been using her therapy much.  She is otherwise doing ok, though sleep quality wasn't great, she now has no problem  falling asleep at night.    RLS Followup:   none.    Daytime Symptoms    Patient reports DAYTIME SYMPTOMS: excessively sleepy during the day  Patient denies daytime symptoms including: Denies: feeling sleepy when driving    Naps: No  Fatigue: denies feeling fatigue    ESS: 12  LISE: 3  FOSQ: 33    REVIEW OF SYSTEMS     REVIEW OF SYSTEMS  Review of Systems   Constitutional: Negative.    HENT: Negative.     Respiratory: Negative.     Cardiovascular: Negative.    Genitourinary: Negative.    Skin: Negative.    Neurological: Negative.    Psychiatric/Behavioral: Negative.           ALLERGIES AND MEDICATIONS     ALLERGIES  Allergies   Allergen Reactions    Lipitor [Atorvastatin] Other     Muscle aches    Methotrexate GI Upset    Iodine Other     vomiting       MEDICATIONS: She has a current medication list which includes the following prescription(s): albuterol - Inhale, amlodipine - Take 1 tablet (5 mg) by mouth once daily, ascorbic acid - Take 1 tablet (1,000 mg) by mouth once daily, breo ellipta - INHALE 1 PUFF ONCE A DAY 90, bupropion xl - Take 1 tablet (150 mg) by mouth once daily, algae based calcium - Take by mouth, chlorthalidone - TAKE 1 TABLET BY MOUTH EVERY DAY IN THE MORNING WITH FOOD, cholecalciferol - Take by mouth, citalopram - Take 1 tablet (40 mg) by mouth once daily, elderberry fruit and flower - Take by mouth, ergocalciferol - TAKE 1 CAPSULE (95709 UNITS) BY MOUTH ONE TIME PER WEEK, etanercept - Inject 1 mL (50 mg) under the skin 1 (one) time per week, ezetimibe - TAKE 1 TABLET BY MOUTH EVERY DAY, folic acid - TAKE 1 TABLET ORALLY ONCE A DAY EXCEPT THE DAY OF METHOTREXATE 90 DAYS, garlic - Take by mouth, mv-min/iron/folic/calcium/vitk - Take by mouth, pantoprazole - Take 1 tablet (40 mg) by mouth 2 times a day, rosuvastatin - TAKE 1 TABLET BY MOUTH EVERY DAY, and synthroid - Take 1 tablet (100 mcg) by mouth once daily in the morning. Take before meals.    PAST MEDICAL HISTORY : She  has a past medical  "history of Asthma, Body mass index (BMI) 33.0-33.9, adult (2022), Cyst of right kidney, Disease of thyroid gland (), Dyslipidemia, Gallstones, GERD (gastroesophageal reflux disease), History of dysphagia, Hypertension, Hypothyroidism (), IGGY (obstructive sleep apnea), Psoriasis, Psoriatic arthritis (Multi), Spondyloarthritis, and Thyroid cancer (Multi).    PAST SURGICAL HISTORY: She  has a past surgical history that includes  section, classic (2016); Other surgical history; Total thyroidectomy (); Hand surgery (Right); Foot surgery; Endometrial ablation (); and  section, low transverse (, , , ).     FAMILY HISTORY: No changes since previous visit. Otherwise non-contributory as charted.     SOCIAL HISTORY  She  reports that she has never smoked. She has never used smokeless tobacco. She reports that she does not currently use alcohol. She reports that she does not use drugs.       PHYSICAL EXAM     VITAL SIGNS: /85 (BP Location: Right arm, Patient Position: Sitting, BP Cuff Size: Adult)   Pulse 80   Temp 36.9 °C (98.5 °F) (Oral)   Ht 1.626 m (5' 4\")   Wt 84.1 kg (185 lb 6.4 oz)   BMI 31.82 kg/m²      PREVIOUS WEIGHTS:  Wt Readings from Last 3 Encounters:   11/15/24 84.1 kg (185 lb 6.4 oz)   24 78.5 kg (173 lb)   24 79 kg (174 lb 3.2 oz)         RESULTS/DATA     Bicarbonate   Date Value   2024 29 mmol/L   2023 30 mmol/L   2023 29 MMOL/L   2023 29 MMOL/L   2022 30 mmol/L   2022 30 mmol/L     Ferritin (NG/ML)   Date Value   2021 150               "

## 2024-11-15 NOTE — ASSESSMENT & PLAN NOTE
BP Readings from Last 1 Encounters:   11/15/24 142/85     - doing well, asymptomatic  - discussed at length the impact of untreated IGGY and BP control  - continue current management and follow-up with PCP

## 2024-11-15 NOTE — ASSESSMENT & PLAN NOTE
Last visit in 2023:  Incoming settings: Usage: 32hrs/wk, Amplitude: 2.6V, Range: 2.3V - 2.9V, Configuration: +-+  HSAT while on this voltage showed uncontrolled IGGY, and I advised patient to step up on voltage.  However, she reports that high level caused her ear pain  We trials different electrode configuration today and she did well.   - Functional Threshold:  (Default) At activation FT: 0.9V; At visit on 22: FT: 1.8V; today 2023: 0.8v   Outgoing settings: Amplitude: 1.2V, Range: 1.0V - 2.0V, Configuration: 0-0, Start Delay: 60min (increased), Pause: 15min, Duration: 8hrs  Patient unable to tolerate levels where previous sleep study showed TA. Configuration changed with good tongue protrusion noted and patient confirming comfort.  - she is instructed to titrate her voltage level based on symptoms such as nocturnal awakening and snoring  - encouraged increase daily exercise    Visit on 3/8/2024  Usage: 6.25 hr/night  Incoming settings: confi-0, 1.0-2.0 @ 1.4v; start delay 60 min, pause 15 min, duration 8 hr  Outgoing settings: confi-0, 1.0-1.5 @ 1.4v; start delay 50 min, pause 20 min, duration 8 hr    If she continues to do well with the therapy and able to show consistency in the next 3-4 months, we will re-evaluate and repeat HSAT testing at follow-up visit    Analysis on 11/15/2024  Winter is her with her special needs daughter (Winter is frequently up at night with her daughter attending to her need which can create significant disruptions in sleep maintenance reflected in usage hours)  Winter also states that she was sick and was unable to use therapy for several days and when she re-instituted therapy it was too strong so she decreased amplitude.  Incoming settings: Electrode configuration: 0 - 0; PW: 90; Hz: 33  Amplitude: 1.1V (1.0 to 1.5); Usage: 18 hrs/wk  ST: 0.3V; FT: 0.5V (Probably a little more than FT)  Impedances at 1.5V: Normal; Waveform at 0.8V: Normal  Outgoing  settings:  Amplitude: 0.8V (0.7 to 1.3)  Plan: focus on increasing usage vs increasing amplitude.  F/U 6 months

## 2024-11-22 ENCOUNTER — APPOINTMENT (OUTPATIENT)
Dept: RHEUMATOLOGY | Facility: CLINIC | Age: 54
End: 2024-11-22
Payer: COMMERCIAL

## 2025-01-08 RX ORDER — DICYCLOMINE HYDROCHLORIDE 20 MG/1
TABLET ORAL
COMMUNITY
Start: 2024-10-07

## 2025-01-08 RX ORDER — ALBUTEROL SULFATE AND BUDESONIDE 90; 80 UG/1; UG/1
2 AEROSOL, METERED RESPIRATORY (INHALATION) 4 TIMES DAILY PRN
COMMUNITY
Start: 2024-11-25 | End: 2025-11-25

## 2025-01-14 DIAGNOSIS — L40.50 PSORIATIC ARTHROPATHY (MULTI): ICD-10-CM

## 2025-01-14 NOTE — TELEPHONE ENCOUNTER
RECEIVED CALL FROM [facility] THAT [facility] NEEDS NEW PRIOR & CASE HAS BEEN CLOSED DUE TO NO RESPONSE FROM OUR OFFICE. THEY DID NOT HAVE CORRECT FAX NUMBERS. PLEASE SEND TO  [facility]CIALTY TO INITIATE PA.

## 2025-01-17 ENCOUNTER — APPOINTMENT (OUTPATIENT)
Dept: RHEUMATOLOGY | Facility: CLINIC | Age: 55
End: 2025-01-17
Payer: COMMERCIAL

## 2025-01-19 DIAGNOSIS — E55.9 VITAMIN D DEFICIENCY: ICD-10-CM

## 2025-01-20 RX ORDER — ERGOCALCIFEROL 1.25 MG/1
50000 CAPSULE ORAL
Qty: 12 CAPSULE | Refills: 1 | Status: SHIPPED | OUTPATIENT
Start: 2025-01-26

## 2025-02-06 ENCOUNTER — APPOINTMENT (OUTPATIENT)
Dept: RHEUMATOLOGY | Facility: CLINIC | Age: 55
End: 2025-02-06
Payer: COMMERCIAL

## 2025-02-06 LAB
25(OH)D3+25(OH)D2 SERPL-MCNC: 55 NG/ML (ref 30–100)
ALBUMIN SERPL-MCNC: 4.8 G/DL (ref 3.6–5.1)
ALP SERPL-CCNC: 53 U/L (ref 37–153)
ALT SERPL-CCNC: 17 U/L (ref 6–29)
ANION GAP SERPL CALCULATED.4IONS-SCNC: 8 MMOL/L (CALC) (ref 7–17)
AST SERPL-CCNC: 18 U/L (ref 10–35)
BASOPHILS # BLD AUTO: 72 CELLS/UL (ref 0–200)
BASOPHILS NFR BLD AUTO: 1.1 %
BILIRUB SERPL-MCNC: 1.2 MG/DL (ref 0.2–1.2)
BUN SERPL-MCNC: 16 MG/DL (ref 7–25)
CALCIUM SERPL-MCNC: 9.6 MG/DL (ref 8.6–10.4)
CHLORIDE SERPL-SCNC: 102 MMOL/L (ref 98–110)
CK SERPL-CCNC: 261 U/L (ref 21–240)
CO2 SERPL-SCNC: 32 MMOL/L (ref 20–32)
CREAT SERPL-MCNC: 0.82 MG/DL (ref 0.5–1.03)
CRP SERPL-MCNC: <3 MG/L
EGFRCR SERPLBLD CKD-EPI 2021: 84 ML/MIN/1.73M2
EOSINOPHIL # BLD AUTO: 117 CELLS/UL (ref 15–500)
EOSINOPHIL NFR BLD AUTO: 1.8 %
ERYTHROCYTE [DISTWIDTH] IN BLOOD BY AUTOMATED COUNT: 13.3 % (ref 11–15)
ERYTHROCYTE [SEDIMENTATION RATE] IN BLOOD BY WESTERGREN METHOD: 2 MM/H
GLUCOSE SERPL-MCNC: 106 MG/DL (ref 65–99)
HCT VFR BLD AUTO: 39.9 % (ref 35–45)
HGB BLD-MCNC: 12.9 G/DL (ref 11.7–15.5)
LYMPHOCYTES # BLD AUTO: 2314 CELLS/UL (ref 850–3900)
LYMPHOCYTES NFR BLD AUTO: 35.6 %
MCH RBC QN AUTO: 27.4 PG (ref 27–33)
MCHC RBC AUTO-ENTMCNC: 32.3 G/DL (ref 32–36)
MCV RBC AUTO: 84.9 FL (ref 80–100)
MONOCYTES # BLD AUTO: 377 CELLS/UL (ref 200–950)
MONOCYTES NFR BLD AUTO: 5.8 %
NEUTROPHILS # BLD AUTO: 3621 CELLS/UL (ref 1500–7800)
NEUTROPHILS NFR BLD AUTO: 55.7 %
PLATELET # BLD AUTO: 322 THOUSAND/UL (ref 140–400)
PMV BLD REES-ECKER: 12 FL (ref 7.5–12.5)
POTASSIUM SERPL-SCNC: 3.7 MMOL/L (ref 3.5–5.3)
PROT SERPL-MCNC: 7.1 G/DL (ref 6.1–8.1)
RBC # BLD AUTO: 4.7 MILLION/UL (ref 3.8–5.1)
SODIUM SERPL-SCNC: 142 MMOL/L (ref 135–146)
WBC # BLD AUTO: 6.5 THOUSAND/UL (ref 3.8–10.8)

## 2025-02-07 DIAGNOSIS — Z80.3 FAMILY HISTORY OF BREAST CANCER: ICD-10-CM

## 2025-02-07 DIAGNOSIS — Z91.89 INCREASED RISK OF BREAST CANCER: Primary | ICD-10-CM

## 2025-02-07 DIAGNOSIS — R92.333 HETEROGENEOUSLY DENSE TISSUE OF BOTH BREASTS ON MAMMOGRAPHY: ICD-10-CM

## 2025-02-21 ENCOUNTER — APPOINTMENT (OUTPATIENT)
Dept: SLEEP MEDICINE | Facility: CLINIC | Age: 55
End: 2025-02-21
Payer: COMMERCIAL

## 2025-03-10 ENCOUNTER — OFFICE VISIT (OUTPATIENT)
Dept: URGENT CARE | Age: 55
End: 2025-03-10
Payer: COMMERCIAL

## 2025-03-10 ENCOUNTER — HOSPITAL ENCOUNTER (EMERGENCY)
Facility: HOSPITAL | Age: 55
Discharge: HOME | End: 2025-03-11
Attending: EMERGENCY MEDICINE
Payer: COMMERCIAL

## 2025-03-10 ENCOUNTER — APPOINTMENT (OUTPATIENT)
Dept: RADIOLOGY | Facility: HOSPITAL | Age: 55
End: 2025-03-10
Payer: COMMERCIAL

## 2025-03-10 VITALS
DIASTOLIC BLOOD PRESSURE: 71 MMHG | RESPIRATION RATE: 18 BRPM | OXYGEN SATURATION: 98 % | HEART RATE: 68 BPM | SYSTOLIC BLOOD PRESSURE: 130 MMHG | TEMPERATURE: 97.7 F

## 2025-03-10 VITALS
WEIGHT: 183 LBS | BODY MASS INDEX: 32.43 KG/M2 | HEART RATE: 84 BPM | HEIGHT: 63 IN | SYSTOLIC BLOOD PRESSURE: 166 MMHG | DIASTOLIC BLOOD PRESSURE: 102 MMHG | RESPIRATION RATE: 16 BRPM | OXYGEN SATURATION: 98 % | TEMPERATURE: 98.8 F

## 2025-03-10 DIAGNOSIS — M54.9 ACUTE MIDLINE BACK PAIN, UNSPECIFIED BACK LOCATION: Primary | ICD-10-CM

## 2025-03-10 DIAGNOSIS — N13.30 HYDRONEPHROSIS, UNSPECIFIED HYDRONEPHROSIS TYPE: ICD-10-CM

## 2025-03-10 DIAGNOSIS — R10.11 RIGHT UPPER QUADRANT ABDOMINAL PAIN: Primary | ICD-10-CM

## 2025-03-10 DIAGNOSIS — M47.819 SPONDYLOARTHRITIS: ICD-10-CM

## 2025-03-10 LAB
ALBUMIN SERPL BCP-MCNC: 4.7 G/DL (ref 3.4–5)
ALP SERPL-CCNC: 48 U/L (ref 33–110)
ALT SERPL W P-5'-P-CCNC: 16 U/L (ref 7–45)
ANION GAP SERPL CALCULATED.3IONS-SCNC: 12 MMOL/L (ref 10–20)
APPEARANCE UR: CLEAR
AST SERPL W P-5'-P-CCNC: 17 U/L (ref 9–39)
BASOPHILS # BLD AUTO: 0.06 X10*3/UL (ref 0–0.1)
BASOPHILS NFR BLD AUTO: 0.7 %
BILIRUB SERPL-MCNC: 1.7 MG/DL (ref 0–1.2)
BILIRUB UR STRIP.AUTO-MCNC: NEGATIVE MG/DL
BUN SERPL-MCNC: 14 MG/DL (ref 6–23)
CALCIUM SERPL-MCNC: 9.2 MG/DL (ref 8.6–10.3)
CHLORIDE SERPL-SCNC: 99 MMOL/L (ref 98–107)
CO2 SERPL-SCNC: 28 MMOL/L (ref 21–32)
COLOR UR: ABNORMAL
CREAT SERPL-MCNC: 0.8 MG/DL (ref 0.5–1.05)
EGFRCR SERPLBLD CKD-EPI 2021: 87 ML/MIN/1.73M*2
EOSINOPHIL # BLD AUTO: 0.16 X10*3/UL (ref 0–0.7)
EOSINOPHIL NFR BLD AUTO: 1.9 %
ERYTHROCYTE [DISTWIDTH] IN BLOOD BY AUTOMATED COUNT: 13.1 % (ref 11.5–14.5)
GLUCOSE SERPL-MCNC: 81 MG/DL (ref 74–99)
GLUCOSE UR STRIP.AUTO-MCNC: NORMAL MG/DL
HCT VFR BLD AUTO: 37.5 % (ref 36–46)
HGB BLD-MCNC: 12.7 G/DL (ref 12–16)
HYALINE CASTS #/AREA URNS AUTO: ABNORMAL /LPF
IMM GRANULOCYTES # BLD AUTO: 0.04 X10*3/UL (ref 0–0.7)
IMM GRANULOCYTES NFR BLD AUTO: 0.5 % (ref 0–0.9)
KETONES UR STRIP.AUTO-MCNC: NEGATIVE MG/DL
LEUKOCYTE ESTERASE UR QL STRIP.AUTO: ABNORMAL
LYMPHOCYTES # BLD AUTO: 3.64 X10*3/UL (ref 1.2–4.8)
LYMPHOCYTES NFR BLD AUTO: 42.9 %
MCH RBC QN AUTO: 28 PG (ref 26–34)
MCHC RBC AUTO-ENTMCNC: 33.9 G/DL (ref 32–36)
MCV RBC AUTO: 83 FL (ref 80–100)
MONOCYTES # BLD AUTO: 0.53 X10*3/UL (ref 0.1–1)
MONOCYTES NFR BLD AUTO: 6.3 %
MUCOUS THREADS #/AREA URNS AUTO: ABNORMAL /LPF
NEUTROPHILS # BLD AUTO: 4.05 X10*3/UL (ref 1.2–7.7)
NEUTROPHILS NFR BLD AUTO: 47.7 %
NITRITE UR QL STRIP.AUTO: NEGATIVE
NRBC BLD-RTO: 0 /100 WBCS (ref 0–0)
PH UR STRIP.AUTO: 7 [PH]
PLATELET # BLD AUTO: 294 X10*3/UL (ref 150–450)
POC APPEARANCE, URINE: CLEAR
POC BILIRUBIN, URINE: NEGATIVE
POC BLOOD, URINE: ABNORMAL
POC COLOR, URINE: YELLOW
POC GLUCOSE, URINE: NEGATIVE MG/DL
POC KETONES, URINE: NEGATIVE MG/DL
POC LEUKOCYTES, URINE: ABNORMAL
POC NITRITE,URINE: NEGATIVE
POC PH, URINE: 6.5 PH
POC PROTEIN, URINE: ABNORMAL MG/DL
POC SPECIFIC GRAVITY, URINE: 1.01
POC UROBILINOGEN, URINE: 0.2 EU/DL
POTASSIUM SERPL-SCNC: 2.8 MMOL/L (ref 3.5–5.3)
PREGNANCY TEST URINE, POC: NEGATIVE
PROT SERPL-MCNC: 7.1 G/DL (ref 6.4–8.2)
PROT UR STRIP.AUTO-MCNC: ABNORMAL MG/DL
RBC # BLD AUTO: 4.53 X10*6/UL (ref 4–5.2)
RBC # UR STRIP.AUTO: NEGATIVE MG/DL
RBC #/AREA URNS AUTO: ABNORMAL /HPF
SODIUM SERPL-SCNC: 136 MMOL/L (ref 136–145)
SP GR UR STRIP.AUTO: 1.03
SQUAMOUS #/AREA URNS AUTO: ABNORMAL /HPF
UROBILINOGEN UR STRIP.AUTO-MCNC: NORMAL MG/DL
WBC # BLD AUTO: 8.5 X10*3/UL (ref 4.4–11.3)
WBC #/AREA URNS AUTO: ABNORMAL /HPF

## 2025-03-10 PROCEDURE — 2500000001 HC RX 250 WO HCPCS SELF ADMINISTERED DRUGS (ALT 637 FOR MEDICARE OP)

## 2025-03-10 PROCEDURE — 96365 THER/PROPH/DIAG IV INF INIT: CPT | Mod: 59

## 2025-03-10 PROCEDURE — 96366 THER/PROPH/DIAG IV INF ADDON: CPT

## 2025-03-10 PROCEDURE — 36415 COLL VENOUS BLD VENIPUNCTURE: CPT

## 2025-03-10 PROCEDURE — 76705 ECHO EXAM OF ABDOMEN: CPT | Performed by: RADIOLOGY

## 2025-03-10 PROCEDURE — 81003 URINALYSIS AUTO W/O SCOPE: CPT | Performed by: NURSE PRACTITIONER

## 2025-03-10 PROCEDURE — 74177 CT ABD & PELVIS W/CONTRAST: CPT | Performed by: RADIOLOGY

## 2025-03-10 PROCEDURE — 74177 CT ABD & PELVIS W/CONTRAST: CPT

## 2025-03-10 PROCEDURE — G8433 SCR FOR DEP NOT CPT DOC RSN: HCPCS | Performed by: NURSE PRACTITIONER

## 2025-03-10 PROCEDURE — 76705 ECHO EXAM OF ABDOMEN: CPT

## 2025-03-10 PROCEDURE — 81025 URINE PREGNANCY TEST: CPT | Performed by: NURSE PRACTITIONER

## 2025-03-10 PROCEDURE — 2550000001 HC RX 255 CONTRASTS

## 2025-03-10 PROCEDURE — 3075F SYST BP GE 130 - 139MM HG: CPT | Performed by: NURSE PRACTITIONER

## 2025-03-10 PROCEDURE — 2500000004 HC RX 250 GENERAL PHARMACY W/ HCPCS (ALT 636 FOR OP/ED)

## 2025-03-10 PROCEDURE — 80053 COMPREHEN METABOLIC PANEL: CPT

## 2025-03-10 PROCEDURE — 81001 URINALYSIS AUTO W/SCOPE: CPT

## 2025-03-10 PROCEDURE — 99204 OFFICE O/P NEW MOD 45 MIN: CPT | Performed by: NURSE PRACTITIONER

## 2025-03-10 PROCEDURE — 1036F TOBACCO NON-USER: CPT | Performed by: NURSE PRACTITIONER

## 2025-03-10 PROCEDURE — 99285 EMERGENCY DEPT VISIT HI MDM: CPT | Mod: 25 | Performed by: EMERGENCY MEDICINE

## 2025-03-10 PROCEDURE — 96375 TX/PRO/DX INJ NEW DRUG ADDON: CPT

## 2025-03-10 PROCEDURE — 96361 HYDRATE IV INFUSION ADD-ON: CPT

## 2025-03-10 PROCEDURE — 3078F DIAST BP <80 MM HG: CPT | Performed by: NURSE PRACTITIONER

## 2025-03-10 PROCEDURE — 85025 COMPLETE CBC W/AUTO DIFF WBC: CPT

## 2025-03-10 PROCEDURE — 87086 URINE CULTURE/COLONY COUNT: CPT | Mod: WESLAB

## 2025-03-10 RX ORDER — KETOROLAC TROMETHAMINE 15 MG/ML
15 INJECTION, SOLUTION INTRAMUSCULAR; INTRAVENOUS ONCE
Status: COMPLETED | OUTPATIENT
Start: 2025-03-10 | End: 2025-03-10

## 2025-03-10 RX ORDER — FENTANYL CITRATE 50 UG/ML
50 INJECTION, SOLUTION INTRAMUSCULAR; INTRAVENOUS ONCE
Status: COMPLETED | OUTPATIENT
Start: 2025-03-11 | End: 2025-03-11

## 2025-03-10 RX ORDER — POTASSIUM CHLORIDE 14.9 MG/ML
20 INJECTION INTRAVENOUS ONCE
Status: COMPLETED | OUTPATIENT
Start: 2025-03-10 | End: 2025-03-11

## 2025-03-10 RX ORDER — ONDANSETRON HYDROCHLORIDE 2 MG/ML
4 INJECTION, SOLUTION INTRAVENOUS ONCE
Status: COMPLETED | OUTPATIENT
Start: 2025-03-10 | End: 2025-03-10

## 2025-03-10 RX ORDER — POTASSIUM CHLORIDE 1.5 G/1.58G
40 POWDER, FOR SOLUTION ORAL ONCE
Status: COMPLETED | OUTPATIENT
Start: 2025-03-10 | End: 2025-03-10

## 2025-03-10 RX ADMIN — SODIUM CHLORIDE 1000 ML: 900 INJECTION, SOLUTION INTRAVENOUS at 20:35

## 2025-03-10 RX ADMIN — KETOROLAC TROMETHAMINE 15 MG: 15 INJECTION, SOLUTION INTRAMUSCULAR; INTRAVENOUS at 20:35

## 2025-03-10 RX ADMIN — POTASSIUM CHLORIDE 20 MEQ: 14.9 INJECTION, SOLUTION INTRAVENOUS at 22:00

## 2025-03-10 RX ADMIN — IOHEXOL 75 ML: 350 INJECTION, SOLUTION INTRAVENOUS at 21:45

## 2025-03-10 RX ADMIN — POTASSIUM CHLORIDE 40 MEQ: 1.5 POWDER, FOR SOLUTION ORAL at 22:00

## 2025-03-10 RX ADMIN — ONDANSETRON 4 MG: 2 INJECTION, SOLUTION INTRAMUSCULAR; INTRAVENOUS at 22:00

## 2025-03-10 ASSESSMENT — ENCOUNTER SYMPTOMS
ABDOMINAL PAIN: 1
BACK PAIN: 1

## 2025-03-10 ASSESSMENT — PAIN DESCRIPTION - FREQUENCY: FREQUENCY: CONSTANT/CONTINUOUS

## 2025-03-10 ASSESSMENT — PAIN - FUNCTIONAL ASSESSMENT: PAIN_FUNCTIONAL_ASSESSMENT: 0-10

## 2025-03-10 ASSESSMENT — COLUMBIA-SUICIDE SEVERITY RATING SCALE - C-SSRS
1. IN THE PAST MONTH, HAVE YOU WISHED YOU WERE DEAD OR WISHED YOU COULD GO TO SLEEP AND NOT WAKE UP?: NO
6. HAVE YOU EVER DONE ANYTHING, STARTED TO DO ANYTHING, OR PREPARED TO DO ANYTHING TO END YOUR LIFE?: NO
2. HAVE YOU ACTUALLY HAD ANY THOUGHTS OF KILLING YOURSELF?: NO

## 2025-03-10 ASSESSMENT — PAIN DESCRIPTION - LOCATION: LOCATION: BACK

## 2025-03-10 ASSESSMENT — PAIN SCALES - GENERAL: PAINLEVEL_OUTOF10: 8

## 2025-03-10 NOTE — TELEPHONE ENCOUNTER
"PT CALLED, REQUESTING REFILL OF NABUMETONE 500 MG. STATES SHE DID \"SOMETHING TO BACK\" & IS HAVING PAIN & SPASM. PLEASE SEND TO [facility] ON [address].   "

## 2025-03-10 NOTE — PROGRESS NOTES
Subjective   Patient ID: Winter Feliz is a 54 y.o. female. They present today with a chief complaint of Back Pain (Woke up Saturday with back pain ).    History of Present Illness  Winter Feliz is a 54 y.o. female who presents to the clinic for 2 days of posterior thoracic back pain.  Patient states it starts from the CVA angle and wraps around to the front bilateral.  Patient denies any trauma or falls to the area.  Patient denies any numbness tingling down bilateral legs.  Patient states she has taken Advil around-the-clock with little relief.  Patient denies any urinary frequency, urgency, odor, burning.  Pt denies any chest pain, sob, N/V at this time in clinic.             Past Medical History  Allergies as of 03/10/2025 - Reviewed 03/10/2025   Allergen Reaction Noted    Lipitor [atorvastatin] Other 2023    Methotrexate GI Upset 2023    Iodine Other 2023       (Not in a hospital admission)       Past Medical History:   Diagnosis Date    Asthma     Body mass index (BMI) 33.0-33.9, adult 2022    BMI 33.0-33.9,adult    Cyst of right kidney     resolved per last U/S    Disease of thyroid gland     Dyslipidemia     Gallstones     GERD (gastroesophageal reflux disease)     History of dysphagia     since thyroidectomy    Hypertension     Hypothyroidism     IGGY (obstructive sleep apnea)     Psoriasis     Psoriatic arthritis (Multi)     Spondyloarthritis     Thyroid cancer (Multi)        Past Surgical History:   Procedure Laterality Date     SECTION, CLASSIC  2016     Section     SECTION, LOW TRANSVERSE  , , ,     ENDOMETRIAL ABLATION  2007    FOOT SURGERY      cyst removed from L  foot between 1st and 2nd MTP    HAND SURGERY Right     R  cyst removal    OTHER SURGICAL HISTORY      hypoglossal nerve stimulator- [employer]    TOTAL THYROIDECTOMY  2007    first partial, then total        reports that she has never smoked. She has never used  smokeless tobacco. She reports that she does not currently use alcohol. She reports that she does not use drugs.    Review of Systems  Review of Systems   Gastrointestinal:  Positive for abdominal pain.   Musculoskeletal:  Positive for back pain.   All other systems reviewed and are negative.                                 Objective    Vitals:    03/10/25 1912   BP: 130/71   Pulse: 68   Resp: 18   Temp: 36.5 °C (97.7 °F)   SpO2: 98%     No LMP recorded. Patient is postmenopausal.    Physical Exam  Constitutional:       Appearance: Normal appearance.   Cardiovascular:      Rate and Rhythm: Normal rate and regular rhythm.   Pulmonary:      Effort: Pulmonary effort is normal.      Breath sounds: Normal breath sounds.   Abdominal:      General: Abdomen is flat. Bowel sounds are normal.      Palpations: Abdomen is soft.      Tenderness: There is abdominal tenderness in the right upper quadrant and epigastric area. There is right CVA tenderness and left CVA tenderness. There is no guarding or rebound. Negative signs include Rob's sign, McBurney's sign and obturator sign.          Comments: 6/10 right abdominal pain.   Neurological:      General: No focal deficit present.      Mental Status: She is alert and oriented to person, place, and time. Mental status is at baseline.         Procedures    Point of Care Test & Imaging Results from this visit  Results for orders placed or performed in visit on 03/10/25   POCT pregnancy, urine manually resulted   Result Value Ref Range    Preg Test, Ur Negative Negative   POCT UA Automated manually resulted   Result Value Ref Range    POC Color, Urine Yellow Straw, Yellow, Light-Yellow    POC Appearance, Urine Clear Clear    POC Glucose, Urine NEGATIVE NEGATIVE mg/dl    POC Bilirubin, Urine NEGATIVE NEGATIVE    POC Ketones, Urine NEGATIVE NEGATIVE mg/dl    POC Specific Gravity, Urine 1.015 1.005 - 1.035    POC Blood, Urine TRACE-Intact (A) NEGATIVE    POC PH, Urine 6.5 No Reference  Range Established PH    POC Protein, Urine 30 (1+) (A) NEGATIVE mg/dl    POC Urobilinogen, Urine 0.2 0.2, 1.0 EU/DL    Poc Nitrite, Urine NEGATIVE NEGATIVE    POC Leukocytes, Urine MODERATE (2+) (A) NEGATIVE      No results found.    Diagnostic study results (if any) were reviewed by MINA Boone.    Assessment/Plan   Allergies, medications, history, and pertinent labs/EKGs/Imaging reviewed by MINA Boone.     Medical Decision Making  Patient with signs and symptoms consistent with abdominal pain that cannot be properly assessed in office and is stable. Patient referred to ED for further evaluation and testing.  -Concern for pyelonephritis versus cholecystitis versus pancreatitis.  Advised patient be further evaluated the emergency department.  Patient will drive herself via private car to the emergency department.  Updated supervising physician Dr. Trejo.    Orders and Diagnoses  Diagnoses and all orders for this visit:  Right upper quadrant abdominal pain  -     POCT pregnancy, urine manually resulted  -     POCT UA Automated manually resulted      Medical Admin Record      Patient disposition: ED    Electronically signed by MINA Boone  9:05 PM

## 2025-03-11 PROBLEM — N13.30 HYDRONEPHROSIS: Status: ACTIVE | Noted: 2025-03-11

## 2025-03-11 PROBLEM — M54.9 ACUTE MIDLINE BACK PAIN: Status: ACTIVE | Noted: 2023-08-28

## 2025-03-11 PROCEDURE — 2500000004 HC RX 250 GENERAL PHARMACY W/ HCPCS (ALT 636 FOR OP/ED): Performed by: EMERGENCY MEDICINE

## 2025-03-11 PROCEDURE — 96375 TX/PRO/DX INJ NEW DRUG ADDON: CPT

## 2025-03-11 RX ORDER — OXYCODONE AND ACETAMINOPHEN 5; 325 MG/1; MG/1
1 TABLET ORAL EVERY 6 HOURS PRN
Qty: 10 TABLET | Refills: 0 | Status: SHIPPED | OUTPATIENT
Start: 2025-03-11 | End: 2025-03-21 | Stop reason: HOSPADM

## 2025-03-11 RX ORDER — ONDANSETRON 4 MG/1
4 TABLET, ORALLY DISINTEGRATING ORAL EVERY 8 HOURS PRN
Qty: 12 TABLET | Refills: 0 | Status: ON HOLD | OUTPATIENT
Start: 2025-03-11 | End: 2025-03-21

## 2025-03-11 RX ORDER — NABUMETONE 500 MG/1
500 TABLET, FILM COATED ORAL 2 TIMES DAILY PRN
Qty: 60 TABLET | Refills: 5 | Status: SHIPPED | OUTPATIENT
Start: 2025-03-11 | End: 2025-06-09

## 2025-03-11 RX ADMIN — FENTANYL CITRATE 50 MCG: 0.05 INJECTION, SOLUTION INTRAMUSCULAR; INTRAVENOUS at 00:20

## 2025-03-11 ASSESSMENT — PAIN SCALES - GENERAL
PAINLEVEL_OUTOF10: 6
PAINLEVEL_OUTOF10: 0 - NO PAIN

## 2025-03-11 NOTE — DISCHARGE INSTRUCTIONS
Thank you for choosing [facility] Emergency Department. It was my pleasure to be involved in your care today.         As of today's visit, based on reasonable likelihood, that it is safe for you to be discharged back to your residence to follow-up as an outpatient for ongoing management of your medical problem. You should follow-up with any referrals / primary provider as soon as possible. The contacts (number, addresses) are listed below.         Important:  Even though we think it is safe for you to go home, there is always a small chance that we are missing something that could require hospitalization.  Therefore it is very important that if you get worse or develops any new symptoms that you return here as soon as possible to be re-evaluated.  This includes return of symptoms that have resolved such as fainting, chest pain, or symptoms that could be warning signs for stroke important:  Even though we think it is safe for you to go home, there is always a small chance that we are missing something that could require hospitalization.  Therefore it is very important that if you get worse or develops any new symptoms that you return here as soon as possible to be re-evaluated.  This includes return of symptoms that have resolved such as fainting, chest pain, or symptoms that could be warning signs for stroke         Make sure your pharmacy and primary doctor is aware of any new medications prescribed today.          It is your responsibility to contact as soon as possible, and follow through with, any referrals you were given today. We do recommend you inform them you are a [facility] follow-up patient, as often they can better accommodate your need to be seen, provided their schedules allow. We will, and have, made every effort to ensure you have access to adequate follow-up specialists available.          All problems may not be able to be fixed in one ER visit. This is why timely ongoing care is important, and this  is a responsibility you share in. Further, you are free to follow up with any provider you choose, and this is not limited to our suggestion.          If cultures were obtained today, you will be contacted should anything result that would require further treatment. Please contact the ED at the number provided with questions.          Having trouble affording medications? Try [email or web address]! (This is not a hospital endorsed website, merely a recommendation based on my own personal experiences with [employer])

## 2025-03-11 NOTE — ED PROVIDER NOTES
HPI   Chief Complaint   Patient presents with    Back Pain       HPI  Patient is a 54-year-old female who presents to ED for chief complaint of lower back pain.  Patient was evaluated urgent care had a positive urinalysis was sent to ED for further testing.  Patient does have a history of pyelonephritis.  She denies any fevers today.  States she is has some severe back pain at times.  Denies any other acute complaints.      Patient History   Past Medical History:   Diagnosis Date    Asthma     Body mass index (BMI) 33.0-33.9, adult 2022    BMI 33.0-33.9,adult    Cyst of right kidney     resolved per last U/S    Disease of thyroid gland     Dyslipidemia     Gallstones     GERD (gastroesophageal reflux disease)     History of dysphagia     since thyroidectomy    Hypertension     Hypothyroidism     IGGY (obstructive sleep apnea)     Psoriasis     Psoriatic arthritis (Multi)     Spondyloarthritis     Thyroid cancer (Multi)      Past Surgical History:   Procedure Laterality Date     SECTION, CLASSIC  2016     Section     SECTION, LOW TRANSVERSE  , , ,     ENDOMETRIAL ABLATION  2007    FOOT SURGERY      cyst removed from L  foot between 1st and 2nd MTP    HAND SURGERY Right     R  cyst removal    OTHER SURGICAL HISTORY      hypoglossal nerve stimulator- INSPIRE    TOTAL THYROIDECTOMY  2007    first partial, then total     Family History   Problem Relation Name Age of Onset    Breast cancer Mother Stacey 59        metastatic    Cancer Mother Tsacey     No Known Problems Father      No Known Problems Daughter      No Known Problems Son      Hypertension Maternal Grandmother Grandma     Glaucoma Maternal Grandmother Grandma     Heart disease Maternal Grandmother Grandma     Stroke Maternal Grandmother Grandma     No Known Problems Maternal Grandfather      No Known Problems Paternal Grandmother      No Known Problems Paternal Grandfather      No Known Problems Mother's  Sister       Social History     Tobacco Use    Smoking status: Never    Smokeless tobacco: Never   Vaping Use    Vaping status: Never Used   Substance Use Topics    Alcohol use: Not Currently    Drug use: Never       Physical Exam   ED Triage Vitals [03/10/25 2002]   Temperature Heart Rate Respirations BP   37.1 °C (98.8 °F) 84 16 (!) 166/102      Pulse Ox Temp src Heart Rate Source Patient Position   98 % -- -- --      BP Location FiO2 (%)     -- --       Physical Exam  Vitals reviewed.   Constitutional:       General: She is not in acute distress.     Appearance: Normal appearance. She is not ill-appearing.   HENT:      Head: Atraumatic.   Eyes:      Extraocular Movements: Extraocular movements intact.   Cardiovascular:      Rate and Rhythm: Normal rate and regular rhythm.      Heart sounds: Normal heart sounds.   Pulmonary:      Effort: Pulmonary effort is normal.      Breath sounds: Normal breath sounds.   Abdominal:      Tenderness: There is right CVA tenderness and left CVA tenderness.   Musculoskeletal:         General: Normal range of motion.      Cervical back: Normal range of motion and neck supple.   Skin:     General: Skin is warm and dry.   Neurological:      General: No focal deficit present.      Mental Status: She is alert and oriented to person, place, and time.   Psychiatric:         Mood and Affect: Mood normal.         Behavior: Behavior normal.           ED Course & MDM   Diagnoses as of 03/11/25 1052   Acute midline back pain, unspecified back location   Hydronephrosis, unspecified hydronephrosis type                 No data recorded                                 Medical Decision Making  Parts of this chart have been completed using voice recognition software. Please excuse any errors of transcription.  My thought process and reason for plan has been formulated from the time that I saw the patient until the time of disposition and is not specific to one specific moment during their visit and  furthermore my MDM encompasses this entire chart and not only this text box.    HPI:   A medically appropriate HPI was obtained, outlined above.    Winter Feliz is a  54 y.o. female    Chief Complaint   Patient presents with    Back Pain       Past Medical History:   Diagnosis Date    Asthma     Body mass index (BMI) 33.0-33.9, adult 2022    BMI 33.0-33.9,adult    Cyst of right kidney     resolved per last U/S    Disease of thyroid gland     Dyslipidemia     Gallstones     GERD (gastroesophageal reflux disease)     History of dysphagia     since thyroidectomy    Hypertension     Hypothyroidism 2007    IGGY (obstructive sleep apnea)     Psoriasis     Psoriatic arthritis (Multi)     Spondyloarthritis     Thyroid cancer (Multi)        Past Surgical History:   Procedure Laterality Date     SECTION, CLASSIC  2016     Section     SECTION, LOW TRANSVERSE  , , ,     ENDOMETRIAL ABLATION      FOOT SURGERY      cyst removed from L  foot between 1st and 2nd MTP    HAND SURGERY Right     R  cyst removal    OTHER SURGICAL HISTORY      hypoglossal nerve stimulator- INSPIRE    TOTAL THYROIDECTOMY  2007    first partial, then total       Social History     Tobacco Use    Smoking status: Never    Smokeless tobacco: Never   Vaping Use    Vaping status: Never Used   Substance Use Topics    Alcohol use: Not Currently    Drug use: Never       Family History   Problem Relation Name Age of Onset    Breast cancer Mother Stacey 59        metastatic    Cancer Mother Stacey     No Known Problems Father      No Known Problems Daughter      No Known Problems Son      Hypertension Maternal Grandmother Grandma     Glaucoma Maternal Grandmother Grandma     Heart disease Maternal Grandmother Grandma     Stroke Maternal Grandmother Grandma     No Known Problems Maternal Grandfather      No Known Problems Paternal Grandmother      No Known Problems Paternal Grandfather      No Known Problems  Mother's Sister         Allergies   Allergen Reactions    Lipitor [Atorvastatin] Other     Muscle aches    Methotrexate GI Upset    Iodine Other     vomiting       Current Outpatient Medications   Medication Instructions    albuterol 2.5 mg /3 mL (0.083 %) nebulizer solution Inhale.    albuterol-budesonide (Airsupra) 90-80 mcg/actuation inhaler 2 puffs, 4 times daily PRN    amLODIPine (NORVASC) 5 mg, Daily    ascorbic acid (Vitamin C) 1,000 mg tablet 1 tablet, Daily    Breo Ellipta 200-25 mcg/dose inhaler INHALE 1 PUFF ONCE A DAY 90    buPROPion XL (WELLBUTRIN XL) 150 mg, Daily    jessica-vit D3-mag-K2-min-herb 326 (Algae Based Calcium) 333.33 mg-6.67 mcg-32 mg tablet Take by mouth.    chlorthalidone (Hygroton) 25 mg tablet TAKE 1 TABLET BY MOUTH EVERY DAY IN THE MORNING WITH FOOD    cholecalciferol (Vitamin D-3) 50 mcg (2,000 unit) capsule Take by mouth.    citalopram (CELEXA) 40 mg, Daily    dicyclomine (Bentyl) 20 mg tablet 1 CAPSULE BY MOUTH 3 TIMES A DAY AS NEEDED ABDOMINAL PAIN 30 DAYS    elderberry fruit and flower 460-115 mg capsule Take by mouth.    ergocalciferol (VITAMIN D-2) 50,000 Units, oral, Once Weekly    etanercept (ENBREL) 50 mg, subcutaneous, Once Weekly    ezetimibe (ZETIA) 10 mg, oral, Daily    folic acid (Folvite) 1 mg tablet TAKE 1 TABLET ORALLY ONCE A DAY EXCEPT THE DAY OF METHOTREXATE 90 DAYS    garlic 1,000 mg capsule Take by mouth.    mv-min/iron/folic/calcium/vitK (WOMEN'S MULTIVITAMIN ORAL) Take by mouth.    nabumetone (RELAFEN) 500 mg, oral, 2 times daily PRN    ondansetron ODT (ZOFRAN-ODT) 4 mg, oral, Every 8 hours PRN    pantoprazole (PROTONIX) 40 mg, 2 times daily    rosuvastatin (CRESTOR) 40 mg, oral, Daily    Synthroid 100 mcg, Daily before breakfast   for details    Exam:   No data found.    A medically appropriate exam performed, outlined above, given the known history and presentation.    EKG/Cardiac monitor:   If EKG was done and, it was interpreted by attending physician, see  their note for ED course for more detail.    Medications given during visit:  Medications   ketorolac (Toradol) injection 15 mg (15 mg intravenous Given 3/10/25 2035)   sodium chloride 0.9 % bolus 1,000 mL (0 mL intravenous Stopped 3/11/25 0200)   ondansetron (Zofran) injection 4 mg (4 mg intravenous Given 3/10/25 2200)   potassium chloride (Klor-Con) packet 40 mEq (40 mEq oral Given 3/10/25 2200)   potassium chloride 20 mEq in sterile water for injection 100 mL (0 mEq intravenous Stopped 3/11/25 0159)   iohexol (OMNIPaque) 350 mg iodine/mL solution 75 mL (75 mL intravenous Given 3/10/25 2145)   fentaNYL PF (Sublimaze) injection 50 mcg (50 mcg intravenous Given 3/11/25 0020)        Diagnostic/tests:  Labs Reviewed   COMPREHENSIVE METABOLIC PANEL - Abnormal       Result Value    Glucose 81      Sodium 136      Potassium 2.8 (*)     Chloride 99      Bicarbonate 28      Anion Gap 12      Urea Nitrogen 14      Creatinine 0.80      eGFR 87      Calcium 9.2      Albumin 4.7      Alkaline Phosphatase 48      Total Protein 7.1      AST 17      Bilirubin, Total 1.7 (*)     ALT 16     URINALYSIS WITH REFLEX CULTURE AND MICROSCOPIC - Abnormal    Color, Urine Light-Yellow      Appearance, Urine Clear      Specific Gravity, Urine 1.026      pH, Urine 7.0      Protein, Urine 20 (TRACE)      Glucose, Urine Normal      Blood, Urine NEGATIVE      Ketones, Urine NEGATIVE      Bilirubin, Urine NEGATIVE      Urobilinogen, Urine Normal      Nitrite, Urine NEGATIVE      Leukocyte Esterase, Urine 25 Ginger/uL (*)    MICROSCOPIC ONLY, URINE - Abnormal    WBC, Urine 1-5      RBC, Urine 3-5      Squamous Epithelial Cells, Urine 1-9 (SPARSE)      Mucus, Urine 3+      Hyaline Casts, Urine OCCASIONAL (*)    URINE CULTURE - Normal    Urine Culture        Value: Clinically insignificant growth based on current clinical standards.   CBC WITH AUTO DIFFERENTIAL    WBC 8.5      nRBC 0.0      RBC 4.53      Hemoglobin 12.7      Hematocrit 37.5      MCV 83       MCH 28.0      MCHC 33.9      RDW 13.1      Platelets 294      Neutrophils % 47.7      Immature Granulocytes %, Automated 0.5      Lymphocytes % 42.9      Monocytes % 6.3      Eosinophils % 1.9      Basophils % 0.7      Neutrophils Absolute 4.05      Immature Granulocytes Absolute, Automated 0.04      Lymphocytes Absolute 3.64      Monocytes Absolute 0.53      Eosinophils Absolute 0.16      Basophils Absolute 0.06     URINALYSIS WITH REFLEX CULTURE AND MICROSCOPIC    Narrative:     The following orders were created for panel order Urinalysis with Reflex Culture and Microscopic.  Procedure                               Abnormality         Status                     ---------                               -----------         ------                     Urinalysis with Reflex C...[[id number]]  Abnormal            Final result               Extra Urine Gray Tube[[id number]]                                                         Please view results for these tests on the individual orders.   EXTRA URINE GRAY TUBE        US gallbladder   Final Result   1.  0.3 cm nonmobile echogenic focus along the gallbladder wall, may   represent a gallbladder polyp. No sonographic evidence for acute   cholecystitis.   2.  0.9 cm hyperechoic area at the left hepatic lobe. Nonemergent   contrast-enhanced MRI can be obtained for further characterization.   3.  Mild right hydronephrosis, better evaluated on earlier same date   CT abdomen and pelvis.        MACRO:   Critical Finding:  See findings. Notification was initiated on   3/11/2025 at 12:16 am by  Erika Retana.  (**-YCF-**) Instructions:                  Signed by: Erika Retana 3/11/2025 12:16 AM   Dictation workstation:   [id number]      CT abdomen pelvis w IV contrast   Final Result   1.  Moderate right renal edema and extensive right perinephric fat   stranding and trace amount of fluid as well as mild hydronephrosis   and hydroureter but no obstructing hyperdense  calculi identified.   Findings may be due to recently passed calculus, however, underlying   UTI or ureteral malignancy can not be excluded. Clinical correlation   with urinalysis recommended. Follow-up evaluation with CT urogram can   be performed for better assessment as clinically warranted. No   radiodense calculi bilaterally.   2. Soft tissue mass within the right retroperitoneal space adjacent   to the psoas muscle at the level of right mid ureter but without   causing mass effect upon the adjacent ureter. Neoplastic process not   excluded. CT-guided biopsy can be performed if clinically warranted.   Further evaluation with PET-CT may be performed as clinically   warranted.   3. Suspect uterine fibroids but incompletely characterized in this   exam. Correlation with ultrasound findings suggested.   4. Additional detailed nonacute findings as above.        Critical Finding:  See findings. Notification was initiated on   3/10/2025 at 10:53 pm by  Deejay Renner.  (**-YCF-**) Instructions:        Signed by: Deejay Renner 3/10/2025 10:53 PM   Dictation workstation:   [id number]         In the management and evaluation of the patient´s presenting complaint, the finding of soft tissue mass was found. It is not felt that this has a relationship to the current presenting complaint. However, this finding was verbally discussed with the patient and the importance of outpatient follow-up was stressed.    MDM Summary:  Potassium was repleted in the ED.  Very minimal evidence of UTI however CT shows perinephric stranding.  No significant leukocytosis.  No fever.  Will not treat with antibiotics at this time.  Advised patient to follow-up with her primary care provider.  Return to ED as needed.    We have discussed the diagnosis and risks, and we agree with discharging home to follow-up with appropriate physician as directed. We also discussed returning to the Emergency Department immediately if new or worsening symptoms  occur. We have discussed the symptoms which are most concerning that necessitate immediate return. Pt symptoms have been well controlled here and the patient is safe for discharge with appropriate outpatient follow up. The patient has verbalized understanding to return to ER without delay for new or worsening pains or for any other symptoms or concerns. I utilized the discharge clinical management tool provided [employer] to help estimate risk of negative outcome for this patient.      Disposition:  ED Prescriptions       Medication Sig Dispense Start Date End Date Auth. Provider    oxyCODONE-acetaminophen (Percocet) 5-325 mg tablet () Take 1 tablet by mouth every 6 hours if needed for severe pain (7 - 10) for up to 3 days. 10 tablet 3/11/2025 3/14/2025 Lalo Manning DO    ondansetron ODT (Zofran-ODT) 4 mg disintegrating tablet Dissolve 1 tablet (4 mg) in the mouth every 8 hours if needed for vomiting or nausea for up to 7 days. 12 tablet 3/11/2025 3/18/2025 Lalo Manning DO              Procedure  Procedures     Rashad Mendez PA-C  03/15/25 1116

## 2025-03-11 NOTE — ED PROVIDER NOTES
This patient was seen by the advanced practice provider.  I have personally performed a substantive portion of the encounter.      I have seen and examined the patient; agree with the workup, evaluation, MDM, management and diagnosis.  The care plan has been discussed.       I personally saw the patient and made/approved the management plan and take responsibility for the patient management.       HPI:  54-year-old female presents complaining of back pain.  She had the above symptoms for the last 2 days.  She went to urgent care center to the ED for further evaluation.    Physical exam:  General:  Awake, alert, no acute distress.  Head: Normocephalic, Atraumatic  Neck: Supple, trachea midline, no stridor  Skin: Warm and dry, no rashes   Lungs:  No acute respiratory distress, speaking in full sentences without difficulty  Neuro:  No gross focal neurologic deficits, NIH is 0  Musculoskeletal:  Full range of motion in all 4 extremities  Psychiatric:  Alert oriented x 3, Good insight into condition.    MDM:  With exception of a potassium 2.8 the rest of her workup is relatively unremarkable.  Her potassium was placed with IV and oral potassium.  Ultrasound gallbladder shows:  1.  0.3 cm nonmobile echogenic focus along the gallbladder wall, may  represent a gallbladder polyp. No sonographic evidence for acute  cholecystitis.  2.  0.9 cm hyperechoic area at the left hepatic lobe. Nonemergent  contrast-enhanced MRI can be obtained for further characterization.  3.  Mild right hydronephrosis, better evaluated on earlier same date  CT abdomen and pelvis.    CT abdomen pelvis shows:  IMPRESSION:  1.  Moderate right renal edema and extensive right perinephric fat  stranding and trace amount of fluid as well as mild hydronephrosis  and hydroureter but no obstructing hyperdense calculi identified.  Findings may be due to recently passed calculus, however, underlying  UTI or ureteral malignancy can not be excluded. Clinical  correlation  with urinalysis recommended. Follow-up evaluation with CT urogram can  be performed for better assessment as clinically warranted. No  radiodense calculi bilaterally.  2. Soft tissue mass within the right retroperitoneal space adjacent  to the psoas muscle at the level of right mid ureter but without  causing mass effect upon the adjacent ureter. Neoplastic process not  excluded. CT-guided biopsy can be performed if clinically warranted.  Further evaluation with PET-CT may be performed as clinically  warranted.  3. Suspect uterine fibroids but incompletely characterized in this  exam. Correlation with ultrasound findings suggested.  4. Additional detailed nonacute findings as above.    MDM:  I discussed finding with the patient at bedside.  I suspect that the renal edema and perinephric stranding is most likely the cause of her symptoms.  She was prescribed Percocet and Zofran for home.  She is referral to Dr. Ann with an urgent referral to Dr. Ann with instructions return if condition should worsen.     Lalo Manning, DO  03/11/25 0151       Lalo Manning, DO  03/11/25 0311

## 2025-03-11 NOTE — ED TRIAGE NOTES
Has been having some back pain since Saturday, went to the  where they did a urine test which was positive for a UTI and blood, Send her here for more testing, Hx of a cyst in her kidney and kidney infections

## 2025-03-12 LAB — BACTERIA UR CULT: NORMAL

## 2025-03-13 ENCOUNTER — OFFICE VISIT (OUTPATIENT)
Dept: RHEUMATOLOGY | Facility: CLINIC | Age: 55
End: 2025-03-13
Payer: COMMERCIAL

## 2025-03-13 VITALS
OXYGEN SATURATION: 97 % | HEIGHT: 64 IN | SYSTOLIC BLOOD PRESSURE: 138 MMHG | BODY MASS INDEX: 31.24 KG/M2 | DIASTOLIC BLOOD PRESSURE: 83 MMHG | TEMPERATURE: 97.2 F | RESPIRATION RATE: 17 BRPM | HEART RATE: 75 BPM | WEIGHT: 183 LBS

## 2025-03-13 DIAGNOSIS — L40.50 PSORIATIC ARTHROPATHY (MULTI): Primary | ICD-10-CM

## 2025-03-13 DIAGNOSIS — Z79.899 ENCOUNTER FOR LONG-TERM (CURRENT) USE OF MEDICATIONS: ICD-10-CM

## 2025-03-13 DIAGNOSIS — E55.9 VITAMIN D DEFICIENCY: ICD-10-CM

## 2025-03-13 DIAGNOSIS — M47.819 SPONDYLOARTHRITIS: ICD-10-CM

## 2025-03-13 DIAGNOSIS — M06.09 POLYARTHRITIS WITH NEGATIVE RHEUMATOID FACTOR (MULTI): ICD-10-CM

## 2025-03-13 PROCEDURE — 3079F DIAST BP 80-89 MM HG: CPT | Performed by: INTERNAL MEDICINE

## 2025-03-13 PROCEDURE — 99214 OFFICE O/P EST MOD 30 MIN: CPT | Performed by: INTERNAL MEDICINE

## 2025-03-13 PROCEDURE — 3075F SYST BP GE 130 - 139MM HG: CPT | Performed by: INTERNAL MEDICINE

## 2025-03-13 PROCEDURE — 3008F BODY MASS INDEX DOCD: CPT | Performed by: INTERNAL MEDICINE

## 2025-03-13 ASSESSMENT — ROUTINE ASSESSMENT OF PATIENT INDEX DATA (RAPID3)
SEVERITY_SCORE: 0
GOOD_NIGHTS_SLEEP: WITH SOME DIFFICULTY
FN_SCORE: 4.3
WALK_KILOMETERS: UNABLE TO DO
WASH_DRY_BODY: WITH SOME DIFFICULTY
PARTIPATE_RECREATIONAL_ACTIVITIES: UNABLE TO DO
LIFT_CUP_TO_MOUTH: WITHOUT ANY DIFFICULTY
FEELINGS_ANXIETY_NERVOUS: WITH SOME DIFFICULTY
IN_OUT_TRANSPORT: WITH SOME DIFFICULTY
FEELINGS_DEPRESSION: WITH SOME DIFFICULTY
WALK_FLAT_GROUND: WITH MUCH DIFFICULTY
ON A SCALE OF ONE TO TEN, HOW MUCH PAIN HAVE YOU HAD BECAUSE OF YOUR CONDITION OVER THE PAST WEEK?: 6.5
PICK_CLOTHES_OFF_FLOOR: UNABLE TO DO
TURN_FAUCETS_OFF: WITHOUT ANY DIFFICULTY
ON A SCALE OF ONE TO TEN, CONSIDERING ALL THE WAYS IN WHICH ILLNESS AND HEALTH CONDITIONS MAY AFFECT YOU AT THIS TIME, PLEASE INDICATE BELOW HOW YOU ARE DOING:: 5
ON A SCALE OF ONE TO TEN, CONSIDERING ALL THE WAYS IN WHICH ILLNESS AND HEALTH CONDITIONS MAY AFFECT YOU AT THIS TIME, PLEASE INDICATE BELOW HOW YOU ARE DOING:: 5
DRESS_YOURSELF: WITHOUT ANY DIFFICULTY
WEIGHTED_TOTAL_SCORE: 5.27
IN_OUT_BED: WITHOUT ANY DIFFICULTY
ON A SCALE OF ONE TO TEN, HOW MUCH PAIN HAVE YOU HAD BECAUSE OF YOUR CONDITION OVER THE PAST WEEK?: 6.5
TOTAL RAPID3 SCORE: 15.8
SUM OF QUESTIONS A TO J: 13
SEVERITY_SCORE: HIGH SEVERITY (HS)

## 2025-03-13 ASSESSMENT — PAIN SCALES - GENERAL: PAINLEVEL_OUTOF10: 6

## 2025-03-13 ASSESSMENT — PATIENT HEALTH QUESTIONNAIRE - PHQ9
1. LITTLE INTEREST OR PLEASURE IN DOING THINGS: NOT AT ALL
SUM OF ALL RESPONSES TO PHQ9 QUESTIONS 1 AND 2: 0
2. FEELING DOWN, DEPRESSED OR HOPELESS: NOT AT ALL

## 2025-03-13 NOTE — PROGRESS NOTES
[facility]/  Rheumatology  [address], [address]  [place] [address]  Phone: [phone]  Fax: [phone]    Rheumatology Progress Note 03/13/2025      Winter Feliz is a 54 y.o. female here for   Chief Complaint   Patient presents with    Follow-up       Last Visit: 5/7/24    Rheum Hx    Features of SpA with elbow enthesitis/medial epicondylitis, Achillis  tendonitis, sacroiliac tenderness, possible psoriasis versus seborrheic  dermatitis, in addition to OA  Responds well to nabumetone  Noted labs before showed mild transaminitis deemed likely fatty liver  Referred by Dr Campbell for evaluate and treat- generalized OA.  Chief complaint: Arthritis-osteoarthritis  Since 6 months ago  Slow onset  Progressive remitting course  Precipitating factors: Walking, housework, caregiver job  Associated symptoms: Stiffness, swelling  Better: Sitting  Worse: Activity  Previous treatments:  Naproxen-somewhat helpful  Ibuprofen-somewhat helpful  Tylenol- no help  Nabumetone-very helpful  Occupation: Caregiver  Currently tenderness of bilateral shoulders, elbows, lower back,  bilateral hands, bilateral knees, bilateral ankles  Swelling of bilateral ankles  5-10 minutes morning stiffness  Review of systems positive for:  Fatigue  Scalp tenderness/dandruff  Dry eyes dry mouth- Restasis no help and burned  Swelling of the legs by the end of the day  History of asthma- well controlled  Gastroesophageal reflux disease- controlled w PPI/IBS  Thyroid cancer 2007 s/p resection and radiotherapy  Rashes- around face on forehead- itchy, scaly, red  Joint pain swelling stiffness  Back pain that wakes her up from sleep and improves upon  getting up- elvira SI area and in betwee shoulder blades; hx of sciatica;  no hx of trauma  Muscle aches  Weakness arms legs sometimes probably due to pain  Thyroid disease  Last menstrual period 2007- endometrial ablation hx; DXA yrs  ok  Allergies  Depression anxiety  Sleep  disturbance- has IGGY and just had Inspire implant- not  on yet.  Component      Latest Ref Rng 9/23/2021 5/30/2023   DEEPIKA Negative…     DEEPIKA Titer Negative…     DEEPIKA Pattern (Note)…     SSA ANTIBODIES <0.2…     SSB ANTIBODIES <0.2…     Angiotensin 1 Conv 27…     HLA-B27 Dna Result Negative…     Citrulline Antibody, IgG <15…     Rheumatoid Factor      0 - 20 IU/ML 10     RNP Antibody <0.2…     Thyroglobulin Abs  <0.9…          Previous Tx  Naproxen-somewhat helpful  Ibuprofen-somewhat helpful  Tylenol- no help  Nabumetone-very helpful  Methotrexate- discontinued due to GI upset  SSZ- discontinued due to lack of efficacy  Otezla- discontinued due to lack of efficacy    Health Maintenance  DXA-  T -0.7 (Hip; 11/2021)  Malignancy Hx- thyroid cancer, s/p removal  Component      Latest Ref Rng 9/23/2021   HIV 1/2 Antigen/Antibody Screen with Reflex to Confirmation Non Reactive…    HIV 1/2 Antibody Confirmation Test Not Indicated    Hiv Interpretation Negative…    Hepatitis B Surface AG      NEG  NEGATIVE    Hepatitis C AB Negative…      Component      Latest Ref Rng 10/5/2021   TB Result Negative…      Immunization History   Administered Date(s) Administered    Flu vaccine (IIV4), preservative free *Check age/dose* 09/25/2016, 09/14/2020, 10/12/2021, 10/09/2022, 10/20/2023    Flu vaccine, trivalent, preservative free, age 6 months and greater (Fluarix/Fluzone/Flulaval) 10/11/2015    Hepatitis B vaccine, adult *Check Product/Dose* 08/10/2011    Influenza Whole 09/13/2013    Influenza, Unspecified 09/22/2011    Influenza, injectable, MDCK, quadrivalent 09/25/2018    Influenza, injectable, quadrivalent 09/11/2017, 11/12/2019, 11/09/2020    Influenza, seasonal, injectable 10/11/2015, 10/12/2016    Novel influenza-H1N1-09, preservative-free 12/30/2009    Pfizer COVID-19 vaccine, 12 years and older, (30mcg/0.3mL) (Comirnaty) 10/20/2023    Pfizer COVID-19 vaccine, bivalent, age 12 years and older (30 mcg/0.3 mL) 10/09/2022     Pfizer Purple Cap SARS-CoV-2 2021, 2021, 2021, 12/10/2021    Pneumococcal conjugate vaccine, 13-valent (PREVNAR 13) 2022    Pneumococcal polysaccharide vaccine, 23-valent, age 2 years and older (PNEUMOVAX 23) 2017    Td vaccine, age 7 years and older (TDVAX) 2009    Tdap vaccine, age 7 year and older (BOOSTRIX, ADACEL) 2024    Zoster vaccine, recombinant, adult (SHINGRIX) 10/20/2023, 2024          Past Medical History:   Diagnosis Date    Asthma     Body mass index (BMI) 33.0-33.9, adult 2022    BMI 33.0-33.9,adult    Cyst of right kidney     resolved per last U/S    Disease of thyroid gland     Dyslipidemia     Gallstones     GERD (gastroesophageal reflux disease)     History of dysphagia     since thyroidectomy    Hypertension     Hypothyroidism     IGGY (obstructive sleep apnea)     Psoriasis     Psoriatic arthritis (Multi)     Spondyloarthritis     Thyroid cancer (Multi)       Past Surgical History:   Procedure Laterality Date     SECTION, CLASSIC  2016     Section     SECTION, LOW TRANSVERSE  , , ,     ENDOMETRIAL ABLATION  2007    FOOT SURGERY      cyst removed from L  foot between 1st and 2nd MTP    HAND SURGERY Right     R  cyst removal    OTHER SURGICAL HISTORY      hypoglossal nerve stimulator- INSPIRE    TOTAL THYROIDECTOMY      first partial, then total      Current Outpatient Medications   Medication Sig Dispense Refill    albuterol 2.5 mg /3 mL (0.083 %) nebulizer solution Inhale.      albuterol-budesonide (Airsupra) 90-80 mcg/actuation inhaler Inhale 2 puffs 4 times a day as needed.      amLODIPine (Norvasc) 5 mg tablet Take 1 tablet (5 mg) by mouth once daily.      ascorbic acid (Vitamin C) 1,000 mg tablet Take 1 tablet (1,000 mg) by mouth once daily.      Breo Ellipta 200-25 mcg/dose inhaler INHALE 1 PUFF ONCE A DAY 90      buPROPion XL (Wellbutrin XL) 150 mg 24 hr tablet Take 1 tablet (150  mg) by mouth once daily.      jessica-vit D3-mag-K2-min-herb 326 (Algae Based Calcium) 333.33 mg-6.67 mcg-32 mg tablet Take by mouth.      chlorthalidone (Hygroton) 25 mg tablet TAKE 1 TABLET BY MOUTH EVERY DAY IN THE MORNING WITH FOOD 90 tablet 3    cholecalciferol (Vitamin D-3) 50 mcg (2,000 unit) capsule Take by mouth.      citalopram (CeleXA) 40 mg tablet Take 1 tablet (40 mg) by mouth once daily.      dicyclomine (Bentyl) 20 mg tablet 1 CAPSULE BY MOUTH 3 TIMES A DAY AS NEEDED ABDOMINAL PAIN 30 DAYS      elderberry fruit and flower 460-115 mg capsule Take by mouth.      ergocalciferol (Vitamin D-2) 1.25 MG (41107 UT) capsule TAKE 1 CAPSULE (08677 UNITS) BY MOUTH ONE TIME PER WEEK 12 capsule 1    etanercept (Enbrel) 50 mg/mL (1 mL) injection Inject 1 mL (50 mg) under the skin 1 (one) time per week. 12 mL 3    ezetimibe (Zetia) 10 mg tablet TAKE 1 TABLET BY MOUTH EVERY DAY 90 tablet 1    folic acid (Folvite) 1 mg tablet TAKE 1 TABLET ORALLY ONCE A DAY EXCEPT THE DAY OF METHOTREXATE 90 DAYS 90 tablet 3    garlic 1,000 mg capsule Take by mouth.      mv-min/iron/folic/calcium/vitK (WOMEN'S MULTIVITAMIN ORAL) Take by mouth.      nabumetone (Relafen) 500 mg tablet Take 1 tablet (500 mg) by mouth 2 times a day as needed for moderate pain (4 - 6). 60 tablet 5    ondansetron ODT (Zofran-ODT) 4 mg disintegrating tablet Dissolve 1 tablet (4 mg) in the mouth every 8 hours if needed for vomiting or nausea for up to 7 days. 12 tablet 0    oxyCODONE-acetaminophen (Percocet) 5-325 mg tablet Take 1 tablet by mouth every 6 hours if needed for severe pain (7 - 10) for up to 3 days. 10 tablet 0    pantoprazole (ProtoNix) 40 mg EC tablet Take 1 tablet (40 mg) by mouth 2 times a day.      rosuvastatin (Crestor) 40 mg tablet TAKE 1 TABLET BY MOUTH EVERY DAY 90 tablet 0    Synthroid 100 mcg tablet Take 1 tablet (100 mcg) by mouth once daily in the morning. Take before meals.       No current facility-administered medications for this  "visit.      Allergies   Allergen Reactions    Lipitor [Atorvastatin] Other     Muscle aches    Methotrexate GI Upset    Iodine Other     vomiting        Visit Vitals  /83 (BP Location: Left arm, Patient Position: Sitting, BP Cuff Size: Large adult)   Pulse 75   Temp 36.2 °C (97.2 °F) (Temporal)   Resp 17   Ht 1.626 m (5' 4\")   Wt 83 kg (183 lb)   SpO2 97%   BMI 31.41 kg/m²   OB Status Postmenopausal   Smoking Status Never   BSA 1.94 m²            Rapid 3  Function Score (FN): 4.3  Pain Score (PN) (0-10): 6.5  Patient Global (PTGL) (0-10): 5  Rapid3 Score: 15.8  RAPID3 Weighted Score: 5.27       Workup        Component      Latest Ref Rn 4/25/2024   LEUKOCYTES (10*3/UL) IN BLOOD BY AUTOMATED COUNT, Cameroonian      4.4 - 11.3 x10*3/uL 7.3    nRBC      0.0 - 0.0 /100 WBCs 0.0    ERYTHROCYTES (10*6/UL) IN BLOOD BY AUTOMATED COUNT, Cam[place]onian      4.00 - 5.20 x10*6/uL 4.78    HEMOGLOBIN      12.0 - 16.0 g/dL 12.8    HEMATOCRIT      36.0 - 46.0 % 40.1    MCV      80 - 100 fL 84    MCH      26.0 - 34.0 pg 26.8    MCHC      32.0 - 36.0 g/dL 31.9 (L)    RED CELL DISTRIBUTION WIDTH      11.5 - 14.5 % 14.3    PLATELETS (10*3/UL) IN BLOOD AUTOMATED COUNT, Cam[place]onian      150 - 450 x10*3/uL 340    NEUTROPHILS/100 LEUKOCYTES IN BLOOD BY AUTOMATED COUNT, Cam[place]      40.0 - 80.0 % 44.7    Immature Granulocytes %, Automated      0.0 - 0.9 % 0.3    Lymphocytes %      13.0 - 44.0 % 44.4    Monocytes %      2.0 - 10.0 % 6.5    Eosinophils %      0.0 - 6.0 % 3.0    Basophils %      0.0 - 2.0 % 1.1    NEUTROPHILS (10*3/UL) IN BLOOD BY AUTOMATED COUNT, [place]      1.20 - 7.70 x10*3/uL 3.26    Immature Granulocytes Absolute, Automated      0.00 - 0.70 x10*3/uL 0.02    Lymphocytes Absolute      1.20 - 4.80 x10*3/uL 3.23    Monocytes Absolute      0.10 - 1.00 x10*3/uL 0.47    Eosinophils Absolute      0.00 - 0.70 x10*3/uL 0.22    Basophils Absolute      0.00 - 0.10 x10*3/uL 0.08    GLUCOSE      65 - 99 mg/dL 94    SODIUM      133 - 145 " mmol/L 141    POTASSIUM      3.4 - 5.1 mmol/L 3.9    CHLORIDE      97 - 107 mmol/L 101    Bicarbonate      24 - 31 mmol/L 29    Blood Urea Nitrogen      8 - 25 mg/dL 15    Creatinine      0.40 - 1.60 mg/dL 0.80    EGFR      >60 mL/min/1.73m*2 87    Calcium      8.5 - 10.4 mg/dL 9.2    Albumin      3.5 - 5.0 g/dL 4.6    Alkaline Phosphatase      35 - 125 U/L 60    Total Protein      5.9 - 7.9 g/dL 6.9    AST      5 - 40 U/L 24    Bilirubin Total      0.1 - 1.2 mg/dL 0.7    ALT      5 - 40 U/L 23    Anion Gap      <=19 mmol/L 11    Thyroid Stimulating Hormone      0.27 - 4.20 mIU/L 37.37 (H)    C-Reactive Protein      0.00 - 2.00 mg/dL <0.30    Creatine Kinase      24 - 195 U/L 320 (H)    Sed Rate      0 - 30 mm/h 8    Scan Result Vectra 33 (moderate)   Vitamin D, 25-Hydroxy, Total      31 - 100 ng/mL 28 (L)    Extra Tube Hold for add-ons.       Component      Latest Ref Rng 2/5/2025   WHITE BLOOD CELL COUNT      3.8 - 10.8 Thousand/uL 6.5    RED BLOOD CELL COUNT      3.80 - 5.10 Million/uL 4.70    HEMOGLOBIN      11.7 - 15.5 g/dL 12.9    HEMATOCRIT      35.0 - 45.0 % 39.9    MCV      80.0 - 100.0 fL 84.9    MCH      27.0 - 33.0 pg 27.4    MCHC      32.0 - 36.0 g/dL 32.3    RDW      11.0 - 15.0 % 13.3    PLATELET COUNT      140 - 400 Thousand/uL 322    MPV      7.5 - 12.5 fL 12.0    ABSOLUTE NEUTROPHILS      1,500 - 7,800 cells/uL 3,621    ABSOLUTE LYMPHOCYTES      850 - 3,900 cells/uL 2,314    ABSOLUTE MONOCYTES      200 - 950 cells/uL 377    ABSOLUTE EOSINOPHILS      15 - 500 cells/uL 117    ABSOLUTE BASOPHILS      0 - 200 cells/uL 72    NEUTROPHILS      % 55.7    LYMPHOCYTES      % 35.6    MONOCYTES      % 5.8    EOSINOPHILS      % 1.8    BASOPHILS      % 1.1    GLUCOSE      65 - 99 mg/dL 106 (H)    UREA NITROGEN (BUN)      7 - 25 mg/dL 16    CREATININE      0.50 - 1.03 mg/dL 0.82    EGFR      > OR = 60 mL/min/1.73m2 84    SODIUM      135 - 146 mmol/L 142    POTASSIUM      3.5 - 5.3 mmol/L 3.7    CHLORIDE      98 -  110 mmol/L 102    CARBON DIOXIDE      20 - 32 mmol/L 32    ELECTROLYTE BALANCE      7 - 17 mmol/L (calc) 8    CALCIUM      8.6 - 10.4 mg/dL 9.6    PROTEIN, TOTAL      6.1 - 8.1 g/dL 7.1    ALBUMIN      3.6 - 5.1 g/dL 4.8    BILIRUBIN, TOTAL      0.2 - 1.2 mg/dL 1.2    ALKALINE PHOSPHATASE      37 - 153 U/L 53    AST      10 - 35 U/L 18    ALT      6 - 29 U/L 17    CREATINE KINASE, TOTAL      21 - 240 U/L 261 (H)    SED RATE BY MODIFIED WESTERGREN      < OR = 30 mm/h 2    C-REACTIVE PROTEIN      <8.0 mg/L <3.0    VITAMIN D,25-OH,TOTAL,IA      30 - 100 ng/mL 55       Assessment/Plan  No diagnosis found.     No orders of the defined types were placed in this encounter.     Features of SpA/PsA with elbow enthesitis/med epicondylitis, Achilles tendonitis, SI tenderness, and psoraisis.  Responds well to nabumetone  Fatty liver with mild transaminitis- monitor  Mildly elevated Hg likely due to IGGY    Since last appt, adherent and tolerating Enbrel  Denies any recent or current infection.  Not on any NSAIDs or glucocorticoids.  ROS+ for fatigue, sicca, joint pain  Rapid 3 consistent with low severity  Labs reviewed  D/w pt tx options and decided on   Continue Enbrel  Replete vit D  Will be resuming thyroid med today.   Advised of possible side effects and importance of monitoring.   All questions answered.  Patient to follow up with primary care provider regarding all other medical issues not addressed today and for medical chart updating.           Since last appt, adherent and tolerating Enbrel  Worsening of back pain.  Denies any recent or current infection.  Not on any NSAIDs or glucocorticoids.  ROS+ for ***  Rapid 3 consistent with ***  Labs reviewed  D/w pt tx options and decided on ***  Advised of possible side effects and importance of monitoring.   All questions answered.  Patient to follow up with primary care provider regarding all other medical issues not addressed today and for medical chart updating.      Patient Care Team:  Star Null DO as PCP - General (Family Medicine)  Camille Monson MD as Consulting Physician (Rheumatology)

## 2025-03-18 ENCOUNTER — TELEPHONE (OUTPATIENT)
Dept: SURGERY | Facility: CLINIC | Age: 55
End: 2025-03-18
Payer: COMMERCIAL

## 2025-03-18 NOTE — TELEPHONE ENCOUNTER
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 Ennis was going to remove next Tuesday, March 25 at [facility].  Pt also has a polyp in her gallbladder and Dr. Hand was wondering if Dr. Nelson could do the surgery for the gallbladder at the same time, sent Dr. Nelson an email regarding this.  The urologist has changed to Dr. Ann, but pt is calling asking if this can be done.  Phone is [phone].

## 2025-03-19 ENCOUNTER — HOSPITAL ENCOUNTER (INPATIENT)
Facility: HOSPITAL | Age: 55
LOS: 1 days | Discharge: HOME | DRG: 690 | End: 2025-03-21
Admitting: HOSPITALIST
Payer: COMMERCIAL

## 2025-03-19 DIAGNOSIS — N39.0 LOWER URINARY TRACT INFECTIOUS DISEASE: ICD-10-CM

## 2025-03-19 DIAGNOSIS — R10.84 ABDOMINAL PAIN, GENERALIZED: ICD-10-CM

## 2025-03-19 DIAGNOSIS — M54.9 ACUTE MIDLINE BACK PAIN, UNSPECIFIED BACK LOCATION: ICD-10-CM

## 2025-03-19 DIAGNOSIS — Q62.11 HYDRONEPHROSIS WITH URETEROPELVIC JUNCTION (UPJ) OBSTRUCTION: ICD-10-CM

## 2025-03-19 DIAGNOSIS — N13.30 HYDRONEPHROSIS, UNSPECIFIED HYDRONEPHROSIS TYPE: ICD-10-CM

## 2025-03-19 DIAGNOSIS — N17.9 AKI (ACUTE KIDNEY INJURY): Primary | ICD-10-CM

## 2025-03-19 PROCEDURE — 99285 EMERGENCY DEPT VISIT HI MDM: CPT

## 2025-03-19 ASSESSMENT — COLUMBIA-SUICIDE SEVERITY RATING SCALE - C-SSRS
1. IN THE PAST MONTH, HAVE YOU WISHED YOU WERE DEAD OR WISHED YOU COULD GO TO SLEEP AND NOT WAKE UP?: NO
2. HAVE YOU ACTUALLY HAD ANY THOUGHTS OF KILLING YOURSELF?: NO
6. HAVE YOU EVER DONE ANYTHING, STARTED TO DO ANYTHING, OR PREPARED TO DO ANYTHING TO END YOUR LIFE?: NO

## 2025-03-19 ASSESSMENT — PAIN - FUNCTIONAL ASSESSMENT: PAIN_FUNCTIONAL_ASSESSMENT: 0-10

## 2025-03-19 ASSESSMENT — PAIN SCALES - GENERAL: PAINLEVEL_OUTOF10: 9

## 2025-03-19 ASSESSMENT — PAIN DESCRIPTION - ORIENTATION: ORIENTATION: RIGHT

## 2025-03-19 ASSESSMENT — PAIN DESCRIPTION - LOCATION: LOCATION: ABDOMEN

## 2025-03-19 NOTE — TELEPHONE ENCOUNTER
Called patient and made appt with her for 4/3/25 at 10:00 AM with Dr. Nelson.  Pt voiced understanding.

## 2025-03-20 ENCOUNTER — APPOINTMENT (OUTPATIENT)
Dept: RADIOLOGY | Facility: HOSPITAL | Age: 55
DRG: 690 | End: 2025-03-20
Payer: COMMERCIAL

## 2025-03-20 PROBLEM — N17.9 AKI (ACUTE KIDNEY INJURY) (CMS-HCC): Status: ACTIVE | Noted: 2025-03-20

## 2025-03-20 LAB
ALBUMIN SERPL BCP-MCNC: 4.6 G/DL (ref 3.4–5)
ALP SERPL-CCNC: 50 U/L (ref 33–110)
ALT SERPL W P-5'-P-CCNC: 14 U/L (ref 7–45)
ANION GAP SERPL CALCULATED.3IONS-SCNC: 13 MMOL/L (ref 10–20)
APPEARANCE UR: CLEAR
AST SERPL W P-5'-P-CCNC: 16 U/L (ref 9–39)
BACTERIA #/AREA URNS AUTO: ABNORMAL /HPF
BASOPHILS # BLD AUTO: 0.06 X10*3/UL (ref 0–0.1)
BASOPHILS NFR BLD AUTO: 0.6 %
BILIRUB DIRECT SERPL-MCNC: 0.2 MG/DL (ref 0–0.3)
BILIRUB SERPL-MCNC: 1.6 MG/DL (ref 0–1.2)
BILIRUB UR STRIP.AUTO-MCNC: NEGATIVE MG/DL
BUN SERPL-MCNC: 18 MG/DL (ref 6–23)
CALCIUM SERPL-MCNC: 9.3 MG/DL (ref 8.6–10.3)
CHLORIDE SERPL-SCNC: 101 MMOL/L (ref 98–107)
CO2 SERPL-SCNC: 27 MMOL/L (ref 21–32)
COLOR UR: COLORLESS
CREAT SERPL-MCNC: 1.37 MG/DL (ref 0.5–1.05)
EGFRCR SERPLBLD CKD-EPI 2021: 45 ML/MIN/1.73M*2
EOSINOPHIL # BLD AUTO: 0.16 X10*3/UL (ref 0–0.7)
EOSINOPHIL NFR BLD AUTO: 1.7 %
ERYTHROCYTE [DISTWIDTH] IN BLOOD BY AUTOMATED COUNT: 13.2 % (ref 11.5–14.5)
GLUCOSE SERPL-MCNC: 98 MG/DL (ref 74–99)
GLUCOSE UR STRIP.AUTO-MCNC: NORMAL MG/DL
HCT VFR BLD AUTO: 41.8 % (ref 36–46)
HGB BLD-MCNC: 13.9 G/DL (ref 12–16)
HOLD SPECIMEN: NORMAL
IMM GRANULOCYTES # BLD AUTO: 0.05 X10*3/UL (ref 0–0.7)
IMM GRANULOCYTES NFR BLD AUTO: 0.5 % (ref 0–0.9)
KETONES UR STRIP.AUTO-MCNC: NEGATIVE MG/DL
LEUKOCYTE ESTERASE UR QL STRIP.AUTO: ABNORMAL
LIPASE SERPL-CCNC: 23 U/L (ref 9–82)
LYMPHOCYTES # BLD AUTO: 3.56 X10*3/UL (ref 1.2–4.8)
LYMPHOCYTES NFR BLD AUTO: 38.3 %
MCH RBC QN AUTO: 27.4 PG (ref 26–34)
MCHC RBC AUTO-ENTMCNC: 33.3 G/DL (ref 32–36)
MCV RBC AUTO: 82 FL (ref 80–100)
MONOCYTES # BLD AUTO: 0.63 X10*3/UL (ref 0.1–1)
MONOCYTES NFR BLD AUTO: 6.8 %
MUCOUS THREADS #/AREA URNS AUTO: ABNORMAL /LPF
NEUTROPHILS # BLD AUTO: 4.83 X10*3/UL (ref 1.2–7.7)
NEUTROPHILS NFR BLD AUTO: 52.1 %
NITRITE UR QL STRIP.AUTO: NEGATIVE
NRBC BLD-RTO: 0 /100 WBCS (ref 0–0)
PH UR STRIP.AUTO: 7 [PH]
PLATELET # BLD AUTO: 345 X10*3/UL (ref 150–450)
POTASSIUM SERPL-SCNC: 3.2 MMOL/L (ref 3.5–5.3)
PROT SERPL-MCNC: 7.4 G/DL (ref 6.4–8.2)
PROT UR STRIP.AUTO-MCNC: NEGATIVE MG/DL
RBC # BLD AUTO: 5.07 X10*6/UL (ref 4–5.2)
RBC # UR STRIP.AUTO: NEGATIVE MG/DL
RBC #/AREA URNS AUTO: ABNORMAL /HPF
SODIUM SERPL-SCNC: 138 MMOL/L (ref 136–145)
SP GR UR STRIP.AUTO: 1.01
SQUAMOUS #/AREA URNS AUTO: ABNORMAL /HPF
UROBILINOGEN UR STRIP.AUTO-MCNC: NORMAL MG/DL
WBC # BLD AUTO: 9.3 X10*3/UL (ref 4.4–11.3)
WBC #/AREA URNS AUTO: ABNORMAL /HPF

## 2025-03-20 PROCEDURE — 96361 HYDRATE IV INFUSION ADD-ON: CPT

## 2025-03-20 PROCEDURE — 99233 SBSQ HOSP IP/OBS HIGH 50: CPT | Performed by: INTERNAL MEDICINE

## 2025-03-20 PROCEDURE — 1100000001 HC PRIVATE ROOM DAILY

## 2025-03-20 PROCEDURE — 36415 COLL VENOUS BLD VENIPUNCTURE: CPT

## 2025-03-20 PROCEDURE — 81001 URINALYSIS AUTO W/SCOPE: CPT

## 2025-03-20 PROCEDURE — 96375 TX/PRO/DX INJ NEW DRUG ADDON: CPT

## 2025-03-20 PROCEDURE — 83690 ASSAY OF LIPASE: CPT

## 2025-03-20 PROCEDURE — 2550000001 HC RX 255 CONTRASTS

## 2025-03-20 PROCEDURE — 96374 THER/PROPH/DIAG INJ IV PUSH: CPT

## 2025-03-20 PROCEDURE — 85025 COMPLETE CBC W/AUTO DIFF WBC: CPT

## 2025-03-20 PROCEDURE — 2500000004 HC RX 250 GENERAL PHARMACY W/ HCPCS (ALT 636 FOR OP/ED): Performed by: HOSPITALIST

## 2025-03-20 PROCEDURE — 84075 ASSAY ALKALINE PHOSPHATASE: CPT

## 2025-03-20 PROCEDURE — 2500000001 HC RX 250 WO HCPCS SELF ADMINISTERED DRUGS (ALT 637 FOR MEDICARE OP): Performed by: HOSPITALIST

## 2025-03-20 PROCEDURE — 99223 1ST HOSP IP/OBS HIGH 75: CPT | Performed by: HOSPITALIST

## 2025-03-20 PROCEDURE — 74177 CT ABD & PELVIS W/CONTRAST: CPT

## 2025-03-20 PROCEDURE — 2500000002 HC RX 250 W HCPCS SELF ADMINISTERED DRUGS (ALT 637 FOR MEDICARE OP, ALT 636 FOR OP/ED): Performed by: HOSPITALIST

## 2025-03-20 PROCEDURE — 87086 URINE CULTURE/COLONY COUNT: CPT | Mod: WESLAB

## 2025-03-20 PROCEDURE — 80053 COMPREHEN METABOLIC PANEL: CPT

## 2025-03-20 PROCEDURE — 2500000004 HC RX 250 GENERAL PHARMACY W/ HCPCS (ALT 636 FOR OP/ED)

## 2025-03-20 PROCEDURE — 74177 CT ABD & PELVIS W/CONTRAST: CPT | Performed by: RADIOLOGY

## 2025-03-20 RX ORDER — AMLODIPINE BESYLATE 5 MG/1
5 TABLET ORAL DAILY
Status: DISCONTINUED | OUTPATIENT
Start: 2025-03-20 | End: 2025-03-21 | Stop reason: HOSPADM

## 2025-03-20 RX ORDER — ALBUTEROL SULFATE 0.83 MG/ML
2.5 SOLUTION RESPIRATORY (INHALATION) EVERY 6 HOURS PRN
Status: DISCONTINUED | OUTPATIENT
Start: 2025-03-20 | End: 2025-03-21 | Stop reason: HOSPADM

## 2025-03-20 RX ORDER — CEFTRIAXONE 1 G/50ML
1 INJECTION, SOLUTION INTRAVENOUS EVERY 24 HOURS
Status: DISCONTINUED | OUTPATIENT
Start: 2025-03-21 | End: 2025-03-21 | Stop reason: HOSPADM

## 2025-03-20 RX ORDER — POTASSIUM CHLORIDE 14.9 MG/ML
20 INJECTION INTRAVENOUS ONCE
Status: COMPLETED | OUTPATIENT
Start: 2025-03-20 | End: 2025-03-20

## 2025-03-20 RX ORDER — CEFTRIAXONE 1 G/50ML
1 INJECTION, SOLUTION INTRAVENOUS ONCE
Status: COMPLETED | OUTPATIENT
Start: 2025-03-20 | End: 2025-03-20

## 2025-03-20 RX ORDER — ACETAMINOPHEN 325 MG/1
650 TABLET ORAL EVERY 6 HOURS PRN
Status: DISCONTINUED | OUTPATIENT
Start: 2025-03-20 | End: 2025-03-21 | Stop reason: HOSPADM

## 2025-03-20 RX ORDER — MORPHINE SULFATE 4 MG/ML
4 INJECTION, SOLUTION INTRAMUSCULAR; INTRAVENOUS ONCE
Status: COMPLETED | OUTPATIENT
Start: 2025-03-20 | End: 2025-03-20

## 2025-03-20 RX ORDER — SODIUM CHLORIDE 9 MG/ML
100 INJECTION, SOLUTION INTRAVENOUS CONTINUOUS
Status: ACTIVE | OUTPATIENT
Start: 2025-03-20 | End: 2025-03-21

## 2025-03-20 RX ORDER — CITALOPRAM 40 MG/1
40 TABLET, FILM COATED ORAL DAILY
Status: DISCONTINUED | OUTPATIENT
Start: 2025-03-20 | End: 2025-03-21 | Stop reason: HOSPADM

## 2025-03-20 RX ORDER — DICYCLOMINE HYDROCHLORIDE 10 MG/1
10 CAPSULE ORAL EVERY 6 HOURS PRN
Status: DISCONTINUED | OUTPATIENT
Start: 2025-03-20 | End: 2025-03-21 | Stop reason: HOSPADM

## 2025-03-20 RX ORDER — OXYCODONE HYDROCHLORIDE 5 MG/1
5 TABLET ORAL EVERY 4 HOURS PRN
Status: DISCONTINUED | OUTPATIENT
Start: 2025-03-20 | End: 2025-03-21 | Stop reason: HOSPADM

## 2025-03-20 RX ORDER — LEVOTHYROXINE SODIUM 100 UG/1
100 TABLET ORAL
Status: DISCONTINUED | OUTPATIENT
Start: 2025-03-20 | End: 2025-03-21 | Stop reason: HOSPADM

## 2025-03-20 RX ORDER — ROSUVASTATIN CALCIUM 20 MG/1
40 TABLET, COATED ORAL DAILY
Status: DISCONTINUED | OUTPATIENT
Start: 2025-03-20 | End: 2025-03-21 | Stop reason: HOSPADM

## 2025-03-20 RX ORDER — ONDANSETRON HYDROCHLORIDE 2 MG/ML
4 INJECTION, SOLUTION INTRAVENOUS ONCE
Status: COMPLETED | OUTPATIENT
Start: 2025-03-20 | End: 2025-03-20

## 2025-03-20 RX ORDER — POLYETHYLENE GLYCOL 3350 17 G/17G
17 POWDER, FOR SOLUTION ORAL DAILY PRN
Status: DISCONTINUED | OUTPATIENT
Start: 2025-03-20 | End: 2025-03-21 | Stop reason: HOSPADM

## 2025-03-20 RX ORDER — PANTOPRAZOLE SODIUM 40 MG/1
40 TABLET, DELAYED RELEASE ORAL 2 TIMES DAILY
Status: DISCONTINUED | OUTPATIENT
Start: 2025-03-20 | End: 2025-03-21 | Stop reason: HOSPADM

## 2025-03-20 RX ORDER — HEPARIN SODIUM 5000 [USP'U]/ML
5000 INJECTION, SOLUTION INTRAVENOUS; SUBCUTANEOUS EVERY 8 HOURS SCHEDULED
Status: DISCONTINUED | OUTPATIENT
Start: 2025-03-20 | End: 2025-03-21 | Stop reason: HOSPADM

## 2025-03-20 RX ORDER — ACETAMINOPHEN 500 MG
1000 TABLET ORAL EVERY 6 HOURS PRN
COMMUNITY

## 2025-03-20 RX ORDER — FLUTICASONE FUROATE AND VILANTEROL 200; 25 UG/1; UG/1
1 POWDER RESPIRATORY (INHALATION)
Status: DISCONTINUED | OUTPATIENT
Start: 2025-03-20 | End: 2025-03-21 | Stop reason: HOSPADM

## 2025-03-20 RX ORDER — NABUMETONE 500 MG/1
500 TABLET, FILM COATED ORAL 2 TIMES DAILY PRN
Status: DISCONTINUED | OUTPATIENT
Start: 2025-03-20 | End: 2025-03-21 | Stop reason: HOSPADM

## 2025-03-20 RX ORDER — EZETIMIBE 10 MG/1
10 TABLET ORAL DAILY
Status: DISCONTINUED | OUTPATIENT
Start: 2025-03-20 | End: 2025-03-21 | Stop reason: HOSPADM

## 2025-03-20 RX ORDER — BUPROPION HYDROCHLORIDE 150 MG/1
150 TABLET ORAL DAILY
Status: DISCONTINUED | OUTPATIENT
Start: 2025-03-20 | End: 2025-03-21 | Stop reason: HOSPADM

## 2025-03-20 RX ORDER — ONDANSETRON HYDROCHLORIDE 2 MG/ML
4 INJECTION, SOLUTION INTRAVENOUS EVERY 6 HOURS PRN
Status: DISCONTINUED | OUTPATIENT
Start: 2025-03-20 | End: 2025-03-21 | Stop reason: HOSPADM

## 2025-03-20 RX ADMIN — EZETIMIBE 10 MG: 10 TABLET ORAL at 08:10

## 2025-03-20 RX ADMIN — OXYCODONE HYDROCHLORIDE 5 MG: 5 TABLET ORAL at 11:31

## 2025-03-20 RX ADMIN — OXYCODONE HYDROCHLORIDE 5 MG: 5 TABLET ORAL at 16:13

## 2025-03-20 RX ADMIN — ONDANSETRON 4 MG: 2 INJECTION, SOLUTION INTRAMUSCULAR; INTRAVENOUS at 14:40

## 2025-03-20 RX ADMIN — HEPARIN SODIUM 5000 UNITS: 5000 INJECTION INTRAVENOUS; SUBCUTANEOUS at 05:54

## 2025-03-20 RX ADMIN — AMLODIPINE BESYLATE 5 MG: 5 TABLET ORAL at 08:10

## 2025-03-20 RX ADMIN — OXYCODONE HYDROCHLORIDE 5 MG: 5 TABLET ORAL at 20:57

## 2025-03-20 RX ADMIN — OXYCODONE HYDROCHLORIDE 5 MG: 5 TABLET ORAL at 04:28

## 2025-03-20 RX ADMIN — BUPROPION HYDROCHLORIDE 150 MG: 150 TABLET, EXTENDED RELEASE ORAL at 08:08

## 2025-03-20 RX ADMIN — SODIUM CHLORIDE 1000 ML: 9 INJECTION, SOLUTION INTRAVENOUS at 00:14

## 2025-03-20 RX ADMIN — LEVOTHYROXINE SODIUM 100 MCG: 0.1 TABLET ORAL at 05:54

## 2025-03-20 RX ADMIN — ROSUVASTATIN 40 MG: 20 TABLET, FILM COATED ORAL at 08:08

## 2025-03-20 RX ADMIN — SODIUM CHLORIDE 100 ML/HR: 900 INJECTION, SOLUTION INTRAVENOUS at 14:40

## 2025-03-20 RX ADMIN — CEFTRIAXONE SODIUM 1 G: 1 INJECTION, SOLUTION INTRAVENOUS at 03:55

## 2025-03-20 RX ADMIN — MORPHINE SULFATE 4 MG: 4 INJECTION, SOLUTION INTRAMUSCULAR; INTRAVENOUS at 00:18

## 2025-03-20 RX ADMIN — IOHEXOL 75 ML: 350 INJECTION, SOLUTION INTRAVENOUS at 01:56

## 2025-03-20 RX ADMIN — PANTOPRAZOLE SODIUM 40 MG: 40 TABLET, DELAYED RELEASE ORAL at 08:10

## 2025-03-20 RX ADMIN — HEPARIN SODIUM 5000 UNITS: 5000 INJECTION INTRAVENOUS; SUBCUTANEOUS at 14:40

## 2025-03-20 RX ADMIN — SODIUM CHLORIDE 100 ML/HR: 900 INJECTION, SOLUTION INTRAVENOUS at 03:55

## 2025-03-20 RX ADMIN — SODIUM CHLORIDE 100 ML/HR: 900 INJECTION, SOLUTION INTRAVENOUS at 11:32

## 2025-03-20 RX ADMIN — CITALOPRAM 40 MG: 40 TABLET, FILM COATED ORAL at 08:07

## 2025-03-20 RX ADMIN — FLUTICASONE FUROATE AND VILANTEROL TRIFENATATE 1 PUFF: 200; 25 POWDER RESPIRATORY (INHALATION) at 08:07

## 2025-03-20 RX ADMIN — PANTOPRAZOLE SODIUM 40 MG: 40 TABLET, DELAYED RELEASE ORAL at 20:57

## 2025-03-20 RX ADMIN — HEPARIN SODIUM 5000 UNITS: 5000 INJECTION INTRAVENOUS; SUBCUTANEOUS at 20:58

## 2025-03-20 RX ADMIN — ONDANSETRON 4 MG: 2 INJECTION, SOLUTION INTRAMUSCULAR; INTRAVENOUS at 00:18

## 2025-03-20 RX ADMIN — POTASSIUM CHLORIDE 20 MEQ: 14.9 INJECTION, SOLUTION INTRAVENOUS at 03:55

## 2025-03-20 SDOH — SOCIAL STABILITY: SOCIAL INSECURITY: ARE YOU MARRIED, WIDOWED, DIVORCED, SEPARATED, NEVER MARRIED, OR LIVING WITH A PARTNER?: MARRIED

## 2025-03-20 SDOH — SOCIAL STABILITY: SOCIAL INSECURITY: ARE YOU OR HAVE YOU BEEN THREATENED OR ABUSED PHYSICALLY, EMOTIONALLY, OR SEXUALLY BY ANYONE?: NO

## 2025-03-20 SDOH — SOCIAL STABILITY: SOCIAL INSECURITY: WITHIN THE LAST YEAR, HAVE YOU BEEN AFRAID OF YOUR PARTNER OR EX-PARTNER?: NO

## 2025-03-20 SDOH — SOCIAL STABILITY: SOCIAL INSECURITY
WITHIN THE LAST YEAR, HAVE YOU BEEN KICKED, HIT, SLAPPED, OR OTHERWISE PHYSICALLY HURT BY YOUR PARTNER OR EX-PARTNER?: NO

## 2025-03-20 SDOH — SOCIAL STABILITY: SOCIAL INSECURITY: DOES ANYONE TRY TO KEEP YOU FROM HAVING/CONTACTING OTHER FRIENDS OR DOING THINGS OUTSIDE YOUR HOME?: NO

## 2025-03-20 SDOH — SOCIAL STABILITY: SOCIAL NETWORK
DO YOU BELONG TO ANY CLUBS OR ORGANIZATIONS SUCH AS CHURCH GROUPS, UNIONS, FRATERNAL OR ATHLETIC GROUPS, OR SCHOOL GROUPS?: NO

## 2025-03-20 SDOH — HEALTH STABILITY: PHYSICAL HEALTH
HOW OFTEN DO YOU NEED TO HAVE SOMEONE HELP YOU WHEN YOU READ INSTRUCTIONS, PAMPHLETS, OR OTHER WRITTEN MATERIAL FROM YOUR DOCTOR OR PHARMACY?: RARELY

## 2025-03-20 SDOH — SOCIAL STABILITY: SOCIAL NETWORK: HOW OFTEN DO YOU GET TOGETHER WITH FRIENDS OR RELATIVES?: MORE THAN THREE TIMES A WEEK

## 2025-03-20 SDOH — SOCIAL STABILITY: SOCIAL NETWORK: HOW OFTEN DO YOU ATTEND MEETINGS OF THE CLUBS OR ORGANIZATIONS YOU BELONG TO?: NEVER

## 2025-03-20 SDOH — HEALTH STABILITY: PHYSICAL HEALTH: ON AVERAGE, HOW MANY MINUTES DO YOU ENGAGE IN EXERCISE AT THIS LEVEL?: 10 MIN

## 2025-03-20 SDOH — SOCIAL STABILITY: SOCIAL INSECURITY: ABUSE: ADULT

## 2025-03-20 SDOH — HEALTH STABILITY: PHYSICAL HEALTH
HOW OFTEN DO YOU NEED TO HAVE SOMEONE HELP YOU WHEN YOU READ INSTRUCTIONS, PAMPHLETS, OR OTHER WRITTEN MATERIAL FROM YOUR DOCTOR OR PHARMACY?: NEVER

## 2025-03-20 SDOH — HEALTH STABILITY: PHYSICAL HEALTH: ON AVERAGE, HOW MANY DAYS PER WEEK DO YOU ENGAGE IN MODERATE TO STRENUOUS EXERCISE (LIKE A BRISK WALK)?: 7 DAYS

## 2025-03-20 SDOH — HEALTH STABILITY: MENTAL HEALTH: HOW OFTEN DO YOU HAVE SIX OR MORE DRINKS ON ONE OCCASION?: NEVER

## 2025-03-20 SDOH — ECONOMIC STABILITY: TRANSPORTATION INSECURITY: IN THE PAST 12 MONTHS, HAS LACK OF TRANSPORTATION KEPT YOU FROM MEDICAL APPOINTMENTS OR FROM GETTING MEDICATIONS?: NO

## 2025-03-20 SDOH — SOCIAL STABILITY: SOCIAL INSECURITY: HAVE YOU HAD THOUGHTS OF HARMING ANYONE ELSE?: NO

## 2025-03-20 SDOH — ECONOMIC STABILITY: INCOME INSECURITY: IN THE PAST 12 MONTHS HAS THE ELECTRIC, GAS, OIL, OR WATER COMPANY THREATENED TO SHUT OFF SERVICES IN YOUR HOME?: NO

## 2025-03-20 SDOH — SOCIAL STABILITY: SOCIAL INSECURITY: WITHIN THE LAST YEAR, HAVE YOU BEEN HUMILIATED OR EMOTIONALLY ABUSED IN OTHER WAYS BY YOUR PARTNER OR EX-PARTNER?: NO

## 2025-03-20 SDOH — ECONOMIC STABILITY: HOUSING INSECURITY: IN THE LAST 12 MONTHS, WAS THERE A TIME WHEN YOU WERE NOT ABLE TO PAY THE MORTGAGE OR RENT ON TIME?: NO

## 2025-03-20 SDOH — HEALTH STABILITY: MENTAL HEALTH
DO YOU FEEL STRESS - TENSE, RESTLESS, NERVOUS, OR ANXIOUS, OR UNABLE TO SLEEP AT NIGHT BECAUSE YOUR MIND IS TROUBLED ALL THE TIME - THESE DAYS?: ONLY A LITTLE

## 2025-03-20 SDOH — HEALTH STABILITY: MENTAL HEALTH: HOW MANY DRINKS CONTAINING ALCOHOL DO YOU HAVE ON A TYPICAL DAY WHEN YOU ARE DRINKING?: PATIENT DOES NOT DRINK

## 2025-03-20 SDOH — SOCIAL STABILITY: SOCIAL INSECURITY: HAS ANYONE EVER THREATENED TO HURT YOUR FAMILY OR YOUR PETS?: NO

## 2025-03-20 SDOH — SOCIAL STABILITY: SOCIAL INSECURITY
WITHIN THE LAST YEAR, HAVE YOU BEEN RAPED OR FORCED TO HAVE ANY KIND OF SEXUAL ACTIVITY BY YOUR PARTNER OR EX-PARTNER?: NO

## 2025-03-20 SDOH — SOCIAL STABILITY: SOCIAL INSECURITY: ARE THERE ANY APPARENT SIGNS OF INJURIES/BEHAVIORS THAT COULD BE RELATED TO ABUSE/NEGLECT?: NO

## 2025-03-20 SDOH — SOCIAL STABILITY: SOCIAL INSECURITY: DO YOU FEEL ANYONE HAS EXPLOITED OR TAKEN ADVANTAGE OF YOU FINANCIALLY OR OF YOUR PERSONAL PROPERTY?: NO

## 2025-03-20 SDOH — HEALTH STABILITY: MENTAL HEALTH: HOW OFTEN DO YOU HAVE A DRINK CONTAINING ALCOHOL?: NEVER

## 2025-03-20 SDOH — ECONOMIC STABILITY: HOUSING INSECURITY: AT ANY TIME IN THE PAST 12 MONTHS, WERE YOU HOMELESS OR LIVING IN A SHELTER (INCLUDING NOW)?: NO

## 2025-03-20 SDOH — SOCIAL STABILITY: SOCIAL NETWORK: HOW OFTEN DO YOU ATTEND CHURCH OR RELIGIOUS SERVICES?: MORE THAN 4 TIMES PER YEAR

## 2025-03-20 SDOH — SOCIAL STABILITY: SOCIAL NETWORK: HOW OFTEN DO YOU ATTEND MEETINGS OF THE CLUBS OR ORGANIZATIONS YOU BELONG TO?: PATIENT DECLINED

## 2025-03-20 SDOH — HEALTH STABILITY: PHYSICAL HEALTH: ON AVERAGE, HOW MANY MINUTES DO YOU ENGAGE IN EXERCISE AT THIS LEVEL?: 0 MIN

## 2025-03-20 SDOH — ECONOMIC STABILITY: FOOD INSECURITY: WITHIN THE PAST 12 MONTHS, THE FOOD YOU BOUGHT JUST DIDN'T LAST AND YOU DIDN'T HAVE MONEY TO GET MORE.: NEVER TRUE

## 2025-03-20 SDOH — ECONOMIC STABILITY: FOOD INSECURITY: WITHIN THE PAST 12 MONTHS, YOU WORRIED THAT YOUR FOOD WOULD RUN OUT BEFORE YOU GOT THE MONEY TO BUY MORE.: NEVER TRUE

## 2025-03-20 SDOH — SOCIAL STABILITY: SOCIAL INSECURITY: DO YOU FEEL UNSAFE GOING BACK TO THE PLACE WHERE YOU ARE LIVING?: NO

## 2025-03-20 SDOH — SOCIAL STABILITY: SOCIAL NETWORK: HOW OFTEN DO YOU GET TOGETHER WITH FRIENDS OR RELATIVES?: NEVER

## 2025-03-20 SDOH — SOCIAL STABILITY: SOCIAL NETWORK
IN A TYPICAL WEEK, HOW MANY TIMES DO YOU TALK ON THE PHONE WITH FAMILY, FRIENDS, OR NEIGHBORS?: MORE THAN THREE TIMES A WEEK

## 2025-03-20 SDOH — HEALTH STABILITY: PHYSICAL HEALTH: ON AVERAGE, HOW MANY DAYS PER WEEK DO YOU ENGAGE IN MODERATE TO STRENUOUS EXERCISE (LIKE A BRISK WALK)?: 0 DAYS

## 2025-03-20 SDOH — SOCIAL STABILITY: SOCIAL NETWORK: HOW OFTEN DO YOU ATTEND CHURCH OR RELIGIOUS SERVICES?: 1 TO 4 TIMES PER YEAR

## 2025-03-20 SDOH — ECONOMIC STABILITY: FOOD INSECURITY: HOW HARD IS IT FOR YOU TO PAY FOR THE VERY BASICS LIKE FOOD, HOUSING, MEDICAL CARE, AND HEATING?: NOT HARD AT ALL

## 2025-03-20 SDOH — SOCIAL STABILITY: SOCIAL INSECURITY: HAVE YOU HAD ANY THOUGHTS OF HARMING ANYONE ELSE?: NO

## 2025-03-20 SDOH — HEALTH STABILITY: MENTAL HEALTH
DO YOU FEEL STRESS - TENSE, RESTLESS, NERVOUS, OR ANXIOUS, OR UNABLE TO SLEEP AT NIGHT BECAUSE YOUR MIND IS TROUBLED ALL THE TIME - THESE DAYS?: TO SOME EXTENT

## 2025-03-20 SDOH — ECONOMIC STABILITY: HOUSING INSECURITY: IN THE PAST 12 MONTHS, HOW MANY TIMES HAVE YOU MOVED WHERE YOU WERE LIVING?: 0

## 2025-03-20 SDOH — SOCIAL STABILITY: SOCIAL NETWORK: IN A TYPICAL WEEK, HOW MANY TIMES DO YOU TALK ON THE PHONE WITH FAMILY, FRIENDS, OR NEIGHBORS?: NEVER

## 2025-03-20 SDOH — SOCIAL STABILITY: SOCIAL NETWORK
DO YOU BELONG TO ANY CLUBS OR ORGANIZATIONS SUCH AS CHURCH GROUPS, UNIONS, FRATERNAL OR ATHLETIC GROUPS, OR SCHOOL GROUPS?: PATIENT DECLINED

## 2025-03-20 ASSESSMENT — ACTIVITIES OF DAILY LIVING (ADL)
LACK_OF_TRANSPORTATION: NO
JUDGMENT_ADEQUATE_SAFELY_COMPLETE_DAILY_ACTIVITIES: YES
GROOMING: INDEPENDENT
ASSISTIVE_DEVICE: EYEGLASSES
ADEQUATE_TO_COMPLETE_ADL: YES
WALKS IN HOME: INDEPENDENT
TOILETING: INDEPENDENT
FEEDING YOURSELF: INDEPENDENT
HEARING - RIGHT EAR: FUNCTIONAL
LACK_OF_TRANSPORTATION: NO
BATHING: INDEPENDENT
HEARING - LEFT EAR: FUNCTIONAL
DRESSING YOURSELF: INDEPENDENT
PATIENT'S MEMORY ADEQUATE TO SAFELY COMPLETE DAILY ACTIVITIES?: YES

## 2025-03-20 ASSESSMENT — COGNITIVE AND FUNCTIONAL STATUS - GENERAL
MOBILITY SCORE: 24
MOBILITY SCORE: 24
DAILY ACTIVITIY SCORE: 24
DAILY ACTIVITIY SCORE: 24
PATIENT BASELINE BEDBOUND: NO

## 2025-03-20 ASSESSMENT — LIFESTYLE VARIABLES
AUDIT-C TOTAL SCORE: 0
PRESCIPTION_ABUSE_PAST_12_MONTHS: NO
SUBSTANCE_ABUSE_PAST_12_MONTHS: NO
SKIP TO QUESTIONS 9-10: 1
AUDIT-C TOTAL SCORE: 0
AUDIT-C TOTAL SCORE: 0
HOW OFTEN DO YOU HAVE 6 OR MORE DRINKS ON ONE OCCASION: NEVER
SKIP TO QUESTIONS 9-10: 1
HOW MANY STANDARD DRINKS CONTAINING ALCOHOL DO YOU HAVE ON A TYPICAL DAY: PATIENT DOES NOT DRINK
HOW OFTEN DO YOU HAVE A DRINK CONTAINING ALCOHOL: NEVER

## 2025-03-20 ASSESSMENT — PATIENT HEALTH QUESTIONNAIRE - PHQ9
2. FEELING DOWN, DEPRESSED OR HOPELESS: NOT AT ALL
SUM OF ALL RESPONSES TO PHQ9 QUESTIONS 1 & 2: 0
1. LITTLE INTEREST OR PLEASURE IN DOING THINGS: NOT AT ALL

## 2025-03-20 ASSESSMENT — PAIN DESCRIPTION - LOCATION
LOCATION: ABDOMEN
LOCATION: ABDOMEN

## 2025-03-20 ASSESSMENT — PAIN DESCRIPTION - ORIENTATION
ORIENTATION: RIGHT
ORIENTATION: RIGHT

## 2025-03-20 ASSESSMENT — PAIN DESCRIPTION - DESCRIPTORS
DESCRIPTORS: ACHING;SHARP
DESCRIPTORS: ACHING

## 2025-03-20 ASSESSMENT — PAIN - FUNCTIONAL ASSESSMENT
PAIN_FUNCTIONAL_ASSESSMENT: 0-10

## 2025-03-20 ASSESSMENT — PAIN SCALES - GENERAL
PAINLEVEL_OUTOF10: 4
PAINLEVEL_OUTOF10: 7
PAINLEVEL_OUTOF10: 6
PAINLEVEL_OUTOF10: 7
PAINLEVEL_OUTOF10: 4
PAINLEVEL_OUTOF10: 0 - NO PAIN
PAINLEVEL_OUTOF10: 0 - NO PAIN

## 2025-03-20 NOTE — PROGRESS NOTES
03/20/25 1301   Physical Activity   On average, how many days per week do you engage in moderate to strenuous exercise (like a brisk walk)? 0 days   On average, how many minutes do you engage in exercise at this level? 0 min   Financial Resource Strain   How hard is it for you to pay for the very basics like food, housing, medical care, and heating? Not hard   Housing Stability   In the last 12 months, was there a time when you were not able to pay the mortgage or rent on time? N   In the past 12 months, how many times have you moved where you were living? 0   At any time in the past 12 months, were you homeless or living in a shelter (including now)? N   Transportation Needs   In the past 12 months, has lack of transportation kept you from medical appointments or from getting medications? no   In the past 12 months, has lack of transportation kept you from meetings, work, or from getting things needed for daily living? No   Food Insecurity   Within the past 12 months, you worried that your food would run out before you got the money to buy more. Never true   Within the past 12 months, the food you bought just didn't last and you didn't have money to get more. Never true   Stress   Do you feel stress - tense, restless, nervous, or anxious, or unable to sleep at night because your mind is troubled all the time - these days? To some exte   Social Connections   In a typical week, how many times do you talk on the phone with family, friends, or neighbors? Never   How often do you get together with friends or relatives? Never  (Pt lives with her  and 3 kids)   How often do you attend Christian or Yazidism services? More than 4   Do you belong to any clubs or organizations such as Christian groups, unions, fraternal or athletic groups, or school groups? No   How often do you attend meetings of the clubs or organizations you belong to? Never   Are you , , , , never  , or living with a partner?    Intimate Partner Violence   Within the last year, have you been afraid of your partner or ex-partner? No   Within the last year, have you been humiliated or emotionally abused in other ways by your partner or ex-partner? No   Within the last year, have you been kicked, hit, slapped, or otherwise physically hurt by your partner or ex-partner? No   Within the last year, have you been raped or forced to have any kind of sexual activity by your partner or ex-partner? No   Alcohol Use   Q1: How often do you have a drink containing alcohol? Never   Q2: How many drinks containing alcohol do you have on a typical day when you are drinking? None   Q3: How often do you have six or more drinks on one occasion? Never   Utilities   In the past 12 months has the electric, gas, oil, or water company threatened to shut off services in your home? No   Health Literacy   How often do you need to have someone help you when you read instructions, pamphlets, or other written material from your doctor or pharmacy? Never

## 2025-03-20 NOTE — PROGRESS NOTES
03/20/25 1310   Discharge Planning   Living Arrangements Spouse/significant other   Support Systems Spouse/significant other;Children   Type of Residence Private residence   Number of Stairs Within Residence 24   Do you have animals or pets at home? Yes   Type of Animals or Pets 1 dog, 3 cats   Who is requesting discharge planning? Provider   Home or Post Acute Services None   Expected Discharge Disposition Home   Does the patient need discharge transport arranged? No   Financial Resource Strain   How hard is it for you to pay for the very basics like food, housing, medical care, and heating? Not hard   Housing Stability   In the last 12 months, was there a time when you were not able to pay the mortgage or rent on time? N   In the past 12 months, how many times have you moved where you were living? 0   At any time in the past 12 months, were you homeless or living in a shelter (including now)? N   Transportation Needs   In the past 12 months, has lack of transportation kept you from medical appointments or from getting medications? no   In the past 12 months, has lack of transportation kept you from meetings, work, or from getting things needed for daily living? No   Patient Choice   Patient / Family choosing to utilize agency / facility established prior to hospitalization No   Stroke Family Assessment   Stroke Family Assessment Needed No

## 2025-03-20 NOTE — CARE PLAN
The patient's goals for the shift include rest and wait to see the plan    The clinical goals for the shift include Antibiotics      Problem: Pain  Goal: Takes deep breaths with improved pain control throughout the shift  3/20/2025 1647 by Mirna Cheema RN  Outcome: Progressing  3/20/2025 1646 by Mirna Cheema RN  Outcome: Progressing  3/20/2025 1646 by Mirna Cheema RN  Outcome: Progressing  Goal: Turns in bed with improved pain control throughout the shift  3/20/2025 1647 by Mirna Cheema RN  Outcome: Progressing  3/20/2025 1646 by Mirna Cheema RN  Outcome: Progressing  3/20/2025 1646 by Mirna Cheema RN  Outcome: Progressing  Goal: Walks with improved pain control throughout the shift  3/20/2025 1647 by Mirna Cheema RN  Outcome: Progressing  3/20/2025 1646 by Mirna Cheema RN  Outcome: Progressing  3/20/2025 1646 by Mirna Cheema RN  Outcome: Progressing  Goal: Performs ADL's with improved pain control throughout shift  3/20/2025 1647 by Mirna Cheema RN  Outcome: Progressing  3/20/2025 1646 by Mirna Cheema RN  Outcome: Progressing  3/20/2025 1646 by Mirna Cheema RN  Outcome: Progressing  Goal: Free from opioid side effects throughout the shift  3/20/2025 1647 by Mirna Cheema, DIMITRIS  Outcome: Progressing  3/20/2025 1646 by Mirna Cheema RN  Outcome: Progressing  3/20/2025 1646 by Mirna Cheema RN  Outcome: Progressing  Goal: Free from acute confusion related to pain meds throughout the shift  3/20/2025 1647 by Mirna Cheema, DIMITRIS  Outcome: Progressing  3/20/2025 1646 by Mirna Cheema RN  Outcome: Progressing  3/20/2025 1646 by Mirna Cheema RN  Outcome: Progressing     Problem: Pain  Goal: Turns in bed with improved pain control throughout the shift  3/20/2025 1647 by Mirna Cheema RN  Outcome: Progressing  3/20/2025 1646 by Mirna Cheema RN  Outcome: Progressing  3/20/2025 1646 by Mirna Cheema RN  Outcome: Progressing     Problem: Pain  Goal: Turns in bed with improved pain control throughout the shift  3/20/2025 1647 by Mirna Cheema,  RN  Outcome: Progressing  3/20/2025 1646 by Mirna Cheema RN  Outcome: Progressing  3/20/2025 1646 by Mirna Cheema RN  Outcome: Progressing     Problem: Fall/Injury  Goal: Not fall by end of shift  Outcome: Progressing  Goal: Be free from injury by end of the shift  Outcome: Progressing  Goal: Verbalize understanding of personal risk factors for fall in the hospital  Outcome: Progressing  Goal: Verbalize understanding of risk factor reduction measures to prevent injury from fall in the home  Outcome: Progressing  Goal: Use assistive devices by end of the shift  Outcome: Progressing  Goal: Pace activities to prevent fatigue by end of the shift  Outcome: Progressing

## 2025-03-20 NOTE — PROGRESS NOTES
History Of Present Illness  Khalida Barker is a 70 y.o. female  with past medical history of ankylosing spondylitis, diverticulosis, spinal stenosis, recent Candida esophagitis diagnosis on July 10, 2024, history of MRSA infection in the back requiring IV antibiotics presented to emergency department due to difficulty swallowing and concern for dehydration and malnutrition.  Patient states that she has been having difficulty swallowing pills.  She took 2 Diflucan on the 10th and has been taking 1 daily.  She has been able to get the pills down but when she is trying to swallow them she vomits everything else up.  She feels constant drainage from her nose and is vomiting that up as well and is difficulty even get the liquids down.  She has noticed that she has an increasing leg swelling.  Her albumin in the emergency department was 3.3 but beta hydroxybutyrate was elevated at 1.28 and urine showed 1+ bacteria with greater than 50 WBC and positive leuk esterase.  Patient denies any burning or urgency or frequency with her urination.  She was given IV fluids and feels little more energetic and was referred to hospitalist service for further management.     Past Medical History  She has a past medical history of Ankylosis of sacroiliac joint (06/04/2024), Arthritis, Cervicalgia, Colon polyp, CTS (carpal tunnel syndrome), Diverticulitis (06/27/2023), Hallux valgus, acquired (01/19/2013), Hiatal hernia, HZV (herpes zoster virus) post herpetic neuralgia, OA (osteoarthritis) of knee, Painful scar (09/20/2017), Peripheral neuropathy, Scoliosis, Spinal stenosis, Traumatic ecchymosis of foot (01/19/2013), Trigger finger of right hand, Vision loss, and Vitamin D deficiency.    Surgical History  She has a past surgical history that includes Other surgical history; Knee Arthroplasty (Right); Colonoscopy; Tonsillectomy (1956); Adenoidectomy (1956); Spinal fusion (06/2010); Breast biopsy (1999); Mouth surgery (2016); Mouth surgery  Winter Feliz is a 54 y.o. female on day 0 of admission presenting with NAA (acute kidney injury) (CMS-[facility]).      Subjective   Overall feels a little bit better.  Pain is controlled with oral medications.  He has some nausea.  Denies fever or chills.       Objective     Last Recorded Vitals  /69 (BP Location: Left arm, Patient Position: Sitting)   Pulse 70   Temp 36.4 °C (97.5 °F)   Resp 16   Wt 81.6 kg (180 lb)   SpO2 96%   Intake/Output last 3 Shifts:    Intake/Output Summary (Last 24 hours) at 3/20/2025 1333  Last data filed at 3/20/2025 0555  Gross per 24 hour   Intake 1149 ml   Output --   Net 1149 ml       Admission Weight  Weight: 81.6 kg (180 lb) (03/19/25 2349)    Daily Weight  03/19/25 : 81.6 kg (180 lb)    Image Results  CT abdomen pelvis w IV contrast  Narrative: Interpreted By:  Monster Bruce,   STUDY:  CT ABDOMEN PELVIS W IV CONTRAST;  3/20/2025 1:55 am      INDICATION:  Signs/Symptoms:abd pain,  nausea.      COMPARISON:  3/10/2025      ACCESSION NUMBER(S):  [id number]      ORDERING CLINICIAN:  CHRIS BETH      TECHNIQUE:  Contiguous axial images of the abdomen and pelvis were obtained after  the intravenous administration of  contrast. Coronal and sagittal  reformatted images were obtained from the axial images.      FINDINGS:  There is limited evaluation of the lung bases. Stable 5 mm right  lower lobe pulmonary nodule and subpleural some nodular opacities in  the right lower lobe posteriorly with largest measuring 9 mm. There  is also stable mild subpleural nodular opacity left lower lobe  measuring up to 7 mm.      A subcentimeter hypodensity in the right hepatic lobe is too small to  characterize. The gallbladder is present. No dilatation of the common  bile duct.      The pancreas, spleen, and adrenal glands appear unremarkable.      There is persistent prominent asymmetric right perinephric edema.  There has been interval decrease in previously described  hydronephrosis with mild  (09/28/2021); and [place] tooth extraction (1978).     Social History  She reports that she has never smoked. She has never used smokeless tobacco. She reports that she does not currently use alcohol. She reports that she does not use drugs.    Family History  Family History   Problem Relation Name Age of Onset    Alzheimer's disease Mother      Heart disease Father      Polymyositis Father      Hypertension Brother      Skin cancer Brother      Other (peripheral artery disease) Brother          Allergies  Oxycodone and Tramadol    Scheduled medications  fluticasone, 2 spray, Each Nostril, Daily      Continuous medications     PRN medications        Physical Exam:  General: Not in acute distress, alert  HEENT: PERRLA, head intact and normocephalic.  Patient has significant furrowing of the tongue with extensive fissuring.  No white plaques noted  Neck: Normal to inspection  Lungs: Clear to auscultation, work of breathing within normal limit  Cardiac: Regular rate and rhythm  Abdomen: Soft nontender, positive bowel sounds  : Exam deferred  Skin: Intact  Hematology: No petechia or excessive ecchymosis  Musculoskeletal: Without significant trauma.  2+ pitting edema bilaterally lower extremity  Neurological: Alert awake oriented, no focal deficit, cranial nerves grossly intact  Psych: No suicidal ideation or homicidal ideation     Last Recorded Vitals  /81 (BP Location: Right arm, Patient Position: Lying)   Pulse 85   Temp 36.7 °C (98.1 °F) (Temporal)   Resp (!) 22   Wt 81.6 kg (180 lb)   SpO2 99%     Relevant Results  US gallbladder    Result Date: 6/26/2024  * * *Final Report* * * DATE OF EXAM: Jun 25 2024  5:24PM   S0U   1032  -  US ABD RIGHT UPPER QUADRANT  / ACCESSION #  [id number] PROCEDURE REASON: nausea, vomiting, pain      * * * * Physician Interpretation * * * *  EXAMINATION: RIGHT UPPER QUADRANT ULTRASOUND HISTORY:  nausea, vomiting, pain TECHNIQUE:  Sonography of the right upper quadrant was  residual pelviectasis. No evidence of  obstructive calculus.      There is stable appearance of previously described 2 cm x 1.4 cm soft  tissue nodular lesion in the right retroperitoneum inferior to the  level of the kidney.      No evidence of bowel obstruction or acute appendicitis.      There is thinning and diastasis of the midline rectus musculature  with bulge with underlying bowel.      Limited evaluation of the uterus and adnexa. Likely fibroid in the  uterine fundus for      Urinary bladder is underdistended and not well evaluated.      Multilevel degenerative change of the lumbar spine.      Impression: Persistent prominent asymmetric right perinephric edema. Interval  decrease in previously seen hydronephrosis with mild residual  pelviectasis. No evidence of obstructive calculus. The described  findings may relate to upper urinary tract infection, however other  etiologies are not excluded as described on the prior examination and  follow-up urology evaluation is recommended and continue short-term  progress imaging recommended for further evaluation.      Stable appearance of the previously described 2 cm x 1.4 cm right  retroperitoneal soft tissue nodular lesion with malignancy not  excluded as described on the prior examination.      Several pulmonary nodules and subpleural nodular opacities in the  imaged lower lungs with largest subpleural nodular opacity measuring  9 mm. Outpatient CT chest recommended for further evaluation and to  assess for additional pulmonary nodules.          MACRO:  Critical Finding:  See findings. Notification was initiated on  3/20/2025 at 2:45 am by  Monster Bruce.  (**-YCF-**) Instructions:      Signed by: Monster Bruce 3/20/2025 2:46 AM  Dictation workstation:   [id number]      Physical Exam  Pt is NAD.  Cooperative with exam.  Not toxic.  In no distress at rest.  A, Ox3.  Face is symmetrical.  Skin - no lesions.  Lungs: clear to auscultations B/L. No wheezes, rales,  performed.  Images were obtained and stored in a permanent archive. MQ:  URUQ_2 COMPARISON: None. RESULT: Pancreas:  Normal sonographic appearance.    Portions obscured: tail Liver:      Echotexture:  Normal, homogeneous.      Echogenicity:  Normal      Surface contour:  Smooth      Lesions: Multiple simple and mildly complex cysts are present with largest measuring up to 2.8 cm. Biliary: No intrahepatic biliary duct dilation.      CBD: 0.3 cm at the hilum.      Gallbladder: Normal caliber           -Contents:  No cholelithiasis.  There is gallbladder sludge.           -Wall:  Normal           -Other:  No pericholecystic fluid. Sonographic Meza sign was reported negative. Right Kidney: No hydronephrosis. 5 cm benign-appearing simple cyst and additional smaller cysts. Ascites: None. -    IMPRESSION: Gallbladder sludge. Hepatic and RIGHT renal cysts. : FRANCISCO   Transcribe Date/Time: Jun 26 2024  9:44A Dictated by : BECKY REARDON MD This examination was interpreted and the report reviewed and   electronically signed by: BECKY REARDON MD on Jun 26 2024  9:45AM  EST    XR abdomen 1 view    Result Date: 6/26/2024  * * *Final Report* * * DATE OF EXAM: Jun 25 2024 12:43PM   S0X   5289  -  XR ABDOMEN 1V SUPINE  / ACCESSION #  [id number] PROCEDURE REASON: nausea, vomiting      * * * * Physician Interpretation * * * *  HISTORY:  N/V.   nausea, vomiting. TECHNIQUE: XR ABDOMEN 1V SUPINE COMPARISON: 6/20/2024 KUB along with same day chest x-ray RESULT: No dilated large or small bowel. Pelvis is not included in field-of-view. Thoracolumbar postsurgical change with associated hardware including hardware crossing sacroiliac joints is again seen. Skin staples again seen. Visualized lung bases have better detail on chest x-ray performed at same time. -    IMPRESSION: No bowel dilatation. : [facility]   Transcribe Date/Time: Jun 26 2024  9:32A Dictated by : BECKY REARDON MD This examination was  rhonchi.  Heart: regular S1S2.  Abdomen: soft, NT. BS positive.  Right-sided flank tenderness  Extr.: no edema, cords, cyanosis.  Moves all extr.   Relevant Results               Assessment/Plan                  Assessment & Plan  NAA (acute kidney injury) (CMS-HCC)  Presenting today with acute kidney injury.  She had recent hydronephrosis diagnosed about 10 days ago that is now improving on appearance of imaging.  She has persistent symptoms however and now with acute kidney injury  -Unfortunately received IV contrast in the emergency room which she also received about 10 days ago.  -She saw Dr. Ann yesterday who recommended her for procedure next week.  -Continue IV fluids.  Hold any nephrotoxic agents.  Trend labs    03/202/25:    Kidney injury right-sided hydronephrosis.  Right-sided pyelonephritis.  Continue IV hydration.  IV Rocephin.  Follow cultures.  Urology consulted.  Patient was supposed to have outpatient surgery on Tuesday  The renal function    Accidental finding of soft mass in the retroperitoneum on the right side on CAT scan.  Could be a lymph node from right-sided pyelonephritis?.  Will discuss with Dr. Ann.  Will likely have to repeat CAT scan in few weeks and if still persists will need outpatient biopsy.    Accidental finding of few lung nodules in the lower lobes.  No history of smoking.  Will need outpatient CAT scan of the chest with IV contrast when renal function improves    Accidental finding of 0.9 cm probable liver cyst.  Will need outpatient follow-up, as well as for possible finding of gallbladder polyp.  Patient will need outpatient MRI of the liver and gallbladder.    Those findings were discussed with patient.    History of thyroid cancer in 2007 treated by Dr. Nelson.  Patient had thyroidectomy and radioactive iodine treatment.  She does not follow with oncologist.              Natasha Smith MD       interpreted and the report reviewed and electronically signed by: BECKY REARDON MD on Jun 26 2024  9:43AM  EST    XR abdomen 2 views supine and erect or decub    Result Date: 6/26/2024  * * *Final Report* * * DATE OF EXAM: Jun 25 2024 12:43PM   S0X   5289  -  XR ABDOMEN 1V SUPINE  / ACCESSION #  [id number] PROCEDURE REASON: nausea, vomiting      * * * * Physician Interpretation * * * *  HISTORY:  N/V.   nausea, vomiting. TECHNIQUE: XR ABDOMEN 1V SUPINE COMPARISON: 6/20/2024 KUB along with same day chest x-ray RESULT: No dilated large or small bowel. Pelvis is not included in field-of-view. Thoracolumbar postsurgical change with associated hardware including hardware crossing sacroiliac joints is again seen. Skin staples again seen. Visualized lung bases have better detail on chest x-ray performed at same time. -    IMPRESSION: No bowel dilatation. : [facility]   Transcribe Date/Time: Jun 26 2024  9:32A Dictated by : BECKY REARDON MD This examination was interpreted and the report reviewed and electronically signed by: BECKY REARDON MD on Jun 26 2024  9:43AM  EST    XR chest 1 view    Result Date: 6/25/2024  * * *Final Report* * * DATE OF EXAM: Jun 25 2024 12:43PM   S0X   5290  -  XR CHEST 1V FRONTAL   / ACCESSION #  [id number] PROCEDURE REASON: pain      * * * * Physician Interpretation * * * *  EXAM:  XR CHEST 1V FRONTAL CLINICAL HISTORY:  pain COMPARISON: No available prior. RESULT: Lines, tubes, and devices:  Left-sided PICC line present terminating in the lower SVC. Lungs and pleura: Bilateral lung fields are clear. No pneumothorax or suggestion for obvious/significant pleural effusions. Cardiomediastinal silhouette:  Normal cardiomediastinal silhouette. Other: Posterior fusion hardware with rods and pedicle screws project over the lower thoracic spine. Numerous surgical skin staples also project over the mid to lower chest at the midline.    IMPRESSION: No acute cardiopulmonary process.  Well-positioned left PICC line. : FRANCISCO   Transcribe Date/Time: Jun 25 2024  8:06P Dictated by : ROSANA SMITH MD This examination was interpreted and the report reviewed and electronically signed by: ROSANA SMITH MD on Jun 25 2024  8:07PM  EST     Results for orders placed or performed during the hospital encounter of 07/21/24 (from the past 24 hour(s))   CBC and Auto Differential   Result Value Ref Range    WBC 5.8 4.4 - 11.3 x10*3/uL    nRBC 0.0 0.0 - 0.0 /100 WBCs    RBC 3.91 (L) 4.00 - 5.20 x10*6/uL    Hemoglobin 10.8 (L) 12.0 - 16.0 g/dL    Hematocrit 35.3 (L) 36.0 - 46.0 %    MCV 90 80 - 100 fL    MCH 27.6 26.0 - 34.0 pg    MCHC 30.6 (L) 32.0 - 36.0 g/dL    RDW 15.8 (H) 11.5 - 14.5 %    Platelets 333 150 - 450 x10*3/uL    Neutrophils % 57.6 40.0 - 80.0 %    Immature Granulocytes %, Automated 1.4 (H) 0.0 - 0.9 %    Lymphocytes % 30.9 13.0 - 44.0 %    Monocytes % 8.9 2.0 - 10.0 %    Eosinophils % 0.3 0.0 - 6.0 %    Basophils % 0.9 0.0 - 2.0 %    Neutrophils Absolute 3.36 1.60 - 5.50 x10*3/uL    Immature Granulocytes Absolute, Automated 0.08 0.00 - 0.50 x10*3/uL    Lymphocytes Absolute 1.80 0.80 - 3.00 x10*3/uL    Monocytes Absolute 0.52 0.05 - 0.80 x10*3/uL    Eosinophils Absolute 0.02 0.00 - 0.40 x10*3/uL    Basophils Absolute 0.05 0.00 - 0.10 x10*3/uL   Comprehensive metabolic panel   Result Value Ref Range    Glucose 122 (H) 74 - 99 mg/dL    Sodium 141 136 - 145 mmol/L    Potassium 3.6 3.5 - 5.3 mmol/L    Chloride 104 98 - 107 mmol/L    Bicarbonate 23 21 - 32 mmol/L    Anion Gap 18 10 - 20 mmol/L    Urea Nitrogen 10 6 - 23 mg/dL    Creatinine 0.84 0.50 - 1.05 mg/dL    eGFR 74 >60 mL/min/1.73m*2    Calcium 9.4 8.6 - 10.3 mg/dL    Albumin 3.3 (L) 3.4 - 5.0 g/dL    Alkaline Phosphatase 133 33 - 136 U/L    Total Protein 7.3 6.4 - 8.2 g/dL    AST 17 9 - 39 U/L    Bilirubin, Total 0.7 0.0 - 1.2 mg/dL    ALT 7 7 - 45 U/L   Troponin I, High Sensitivity, Initial   Result Value Ref Range    Troponin I,  High Sensitivity 11 0 - 13 ng/L   Light Blue Top   Result Value Ref Range    Extra Tube Hold for add-ons.    Lavender Top   Result Value Ref Range    Extra Tube Hold for add-ons.    Urinalysis with Reflex Culture and Microscopic   Result Value Ref Range    Color, Urine Light-Yellow Light-Yellow, Yellow, Dark-Yellow    Appearance, Urine Turbid (N) Clear    Specific Gravity, Urine 1.009 1.005 - 1.035    pH, Urine 8.0 5.0, 5.5, 6.0, 6.5, 7.0, 7.5, 8.0    Protein, Urine 30 (1+) (A) NEGATIVE, 10 (TRACE), 20 (TRACE) mg/dL    Glucose, Urine Normal Normal mg/dL    Blood, Urine 0.03 (TRACE) (A) NEGATIVE    Ketones, Urine NEGATIVE NEGATIVE mg/dL    Bilirubin, Urine NEGATIVE NEGATIVE    Urobilinogen, Urine Normal Normal mg/dL    Nitrite, Urine NEGATIVE NEGATIVE    Leukocyte Esterase, Urine 500 Jenn/µL (A) NEGATIVE   Microscopic Only, Urine   Result Value Ref Range    WBC, Urine >50 (A) 1-5, NONE /HPF    RBC, Urine 3-5 NONE, 1-2, 3-5 /HPF    Squamous Epithelial Cells, Urine 1-9 (SPARSE) Reference range not established. /HPF    Bacteria, Urine 1+ (A) NONE SEEN /HPF    Mucus, Urine FEW Reference range not established. /LPF   Troponin, High Sensitivity, 1 Hour   Result Value Ref Range    Troponin I, High Sensitivity 11 0 - 13 ng/L   Beta Hydroxybutyrate   Result Value Ref Range    Beta-Hydroxybutyrate 1.28 (H) 0.02 - 0.27 mmol/L        Assessment/Plan   Khalida Barker is a 70 y.o. female on day 0 of admission presenting with Dysphagia.  Principal Problem:    Dysphagia  Active Problems:    Muscle weakness (generalized)    Dehydration    Ankylosing spondylitis of multiple sites in spine (Multi)      Khalida Barker is a 70 y.o. female  with past medical history of ankylosing spondylitis, diverticulosis, spinal stenosis, recent Candida esophagitis diagnosis on July 10, 2024, history of MRSA infection in the back requiring IV antibiotics presented to emergency department due to difficulty swallowing and concern for dehydration and  malnutrition.    Dysphagia with Candida esophagitis diagnosis  Has been taking Diflucan  Will continue with that and add BMX with nystatin due to concern for pills getting stuck and making her vomit even more  GI consult  Speech and swallow evaluation    Dehydration  IV fluids ordered  Patient already feeling better and her generalized weakness has improved  Will continue with IV fluids overnight  Beta hydroxybutyrate elevated because of dehydration and anticipate improvement with IV fluids    Glossitis  Unclear if this is all primary from Candida but really has no white plaques  Will check vitamin B-12, B6, iron studies    Chronic right shoulder pain  Pain control  PT OT evaluation with generalized weakness    History of diverticulosis  MiraLAX as needed    DVT prophylaxis  Lovenox     Plan discussed with patient and  at bedside in ED bed 14  Full code  High level of MDM based on above issue and discussing plan    This note is created using voice recognition software. All efforts are made to minimize errors, if there are errors there due to transcription.    Ron Leung  Hospitalist

## 2025-03-20 NOTE — CARE PLAN
"Pt does not have a POA or Living Will  ADOD: 1 day    Pt lives at home with her  and 3 children, in a 2 story home with a basement. Pts bed/bath are on the 1st floor; she does not like to use the basement; \"difficult\" using the stairs  Pt does not use any assistive device for ambulation; no falls in the last 6 months  She does not use home 02/cpap/bipap; she has a nebulizer for her asthma  She is treated for both depression and anxiety. Denies S. I.  She is independent with ADL's, she works, drives, ect.   She wears glasses, no hearing aids. She is able to read and comprehend what she reads  Pts PCP is Dr. Stra Null and she uses [facility] [address]  Pt is here for hydronephrosis  No anticipated discharge needs at this time    DISCHARGE PLAN: HOME WITH FAMILY  "

## 2025-03-20 NOTE — PROGRESS NOTES
03/20/25 1311   ACS Disability Status   Are you deaf or do you have serious difficulty hearing? N   Are you blind or do you have serious difficulty seeing, even when wearing glasses? N   Because of a physical, mental, or emotional condition, do you have serious difficulty concentrating, remembering, or making decisions? (5 years old or older) N   Do you have serious difficulty walking or climbing stairs? N   Do you have serious difficulty dressing or bathing? N   Because of a physical, mental, or emotional condition, do you have serious difficulty doing errands alone such as visiting the doctor? N

## 2025-03-20 NOTE — ED TRIAGE NOTES
Was here over a week diagnosed with hydronephrosis, states she also has a mass on her right ureter, states she is supposed to get a stent on Tuesday and hopefully the mass gets removed but pain is increasing and is now unbearable. She is have nausea to the point her food intake is less, denies vomiting, diarrhea, or constipation. Pain is on the right side of ABD,

## 2025-03-20 NOTE — H&P
History Of Present Illness  Winter Feliz is a 54 y.o. female past medical history of IGGY, GERD, hypertension, who presents to the emergency room with right-sided flank pain.  Symptoms started about 10 days ago.  She came to the emergency room was found to have hydronephrosis on the right.  She was discharged from the ER home with pain medication.  Reports that her symptoms persisted after discharge.  Says that today she was feeling dizzy and ill so she decided come back in.  Reports normal appetite and intake.  No fevers or chills.  She has nausea but has not been vomiting.  No dysuria.    Patient reports she saw Dr. Ann yesterday who recommended her for procedure next week.     Past Medical History  She has a past medical history of Asthma, Body mass index (BMI) 33.0-33.9, adult (2022), Cyst of right kidney, Disease of thyroid gland (), Dyslipidemia, Gallstones, GERD (gastroesophageal reflux disease), History of dysphagia, Hypertension, Hypothyroidism (2007), IGGY (obstructive sleep apnea), Psoriasis, Psoriatic arthritis (Multi), Spondyloarthritis, and Thyroid cancer (Multi).    Surgical History  She has a past surgical history that includes  section, classic (2016); Other surgical history; Total thyroidectomy (); Hand surgery (Right); Foot surgery; Endometrial ablation (); and  section, low transverse (, , , ).     Social History  She reports that she has never smoked. She has never used smokeless tobacco. She reports that she does not currently use alcohol. She reports that she does not use drugs.    Family History  Family History   Problem Relation Name Age of Onset    Breast cancer Mother Stacey 59        metastatic    Cancer Mother Stacey     No Known Problems Father      No Known Problems Daughter      No Known Problems Son      Hypertension Maternal Grandmother Grandma     Glaucoma Maternal Grandmother Grandma     Heart disease Maternal Grandmother  Grandma     Stroke Maternal Grandmother Grandma     No Known Problems Maternal Grandfather      No Known Problems Paternal Grandmother      No Known Problems Paternal Grandfather      No Known Problems Mother's Sister          Allergies  Lipitor [atorvastatin], Methotrexate, and Iodine    Review of Systems  General: no fatigue, + malaise, no fevers/chills   HENT: no rhinorrhea, no sore throat, no ear pain   Eyes: no change in vision, denies eye pain or discharge   Lungs: no SOB, no cough, no hemoptysis   CV: no chest pain, no palpitations, no leg edema   Abd: no nausea/vomiting, no constipation/diarrhea, no abdominal pain   : no dysuria, no frequency, no nocturia, + flank pain   Endocrine: no polydipsia/polyuria, no hot or cold intolerance   Neuro: no headaches, no syncope, no seizures   MSK: no back pain, no neck pain, no joint problems   Psych: no anxiety, no depression, no hallucinations    Physical Exam  General: alert, no diaphoresis   HENT: mucous membranes moist, external ears normal, no rhinorrhea   Eyes: no icterus or injection, no discharge   Lungs: CTA BL   Heart: RRR, no LE edema BL   GI: abdomen soft, nontender, nondistended, BS present   MSK: no joint effusion or deformity   Skin: no rashes, erythema, or ecchymosis   Neuro: grossly normal cognition, motor strength, sensation      Last Recorded Vitals  BP (!) 166/102   Pulse 80   Temp 36.4 °C (97.5 °F)   Resp 16   Wt 81.6 kg (180 lb)   SpO2 99%     Relevant Results             Assessment/Plan   Assessment & Plan  NAA (acute kidney injury) (CMS-HCC)  Presenting today with acute kidney injury.  She had recent hydronephrosis diagnosed about 10 days ago that is now improving on appearance of imaging.  She has persistent symptoms however and now with acute kidney injury  -Unfortunately received IV contrast in the emergency room which she also received about 10 days ago.  -She saw Dr. Ann yesterday who recommended her for procedure next  week.  -Continue IV fluids.  Hold any nephrotoxic agents.  Trend labs    UTI  - rocephin, follow culture    Hypokalemia  - mild. Replaced. Recheck    Hydronephrosis  - as above    Abnormal imaging   - lung imaging shows pulmonary nodules that need outpatient follow up  - also has a 2x1.4 cm retroperitoneal soft tissue nodule that malignancy cannot be excluded. This likewise needs further evaluation    HTN  - continue home medication    IGGY  - s/p Inspire device placement    DVT ppx       Rebekah Byrd DO

## 2025-03-20 NOTE — NURSING NOTE
Pt newly admitted from ED. Pt was oriented to room and call light system. IV fluids initiated per order. Verbally denies pain while lying in bed. C/o Intermittent nausea, was medicated , see MAR. Family at bedside. No further needs expressed at this time.

## 2025-03-20 NOTE — ASSESSMENT & PLAN NOTE
Presenting today with acute kidney injury.  She had recent hydronephrosis diagnosed about 10 days ago that is now improving on appearance of imaging.  She has persistent symptoms however and now with acute kidney injury  -Unfortunately received IV contrast in the emergency room which she also received about 10 days ago.  -She saw Dr. Ann yesterday who recommended her for procedure next week.  -Continue IV fluids.  Hold any nephrotoxic agents.  Trend labs  
Presenting today with acute kidney injury.  She had recent hydronephrosis diagnosed about 10 days ago that is now improving on appearance of imaging.  She has persistent symptoms however and now with acute kidney injury  -Unfortunately received IV contrast in the emergency room which she also received about 10 days ago.  -She saw Dr. Ann yesterday who recommended her for procedure next week.  -Continue IV fluids.  Hold any nephrotoxic agents.  Trend labs  
able to follow single-step instructions/100% of the time/able to follow multistep instructions

## 2025-03-20 NOTE — PROGRESS NOTES
Pharmacy Medication History Review    Winter Feliz is a 54 y.o. female admitted for NAA (acute kidney injury) (CMS-[facility]). Pharmacy reviewed the patient's [id number] medications and allergies for accuracy.    Medications ADDED:  Acetaminophen 500mg   Medications CHANGED:  Vitamin D2 35915 - not taking  Nabumetone 500mg - not taking   Medications REMOVED:   Albuterol nebulizer  Vitamin C 1000mg  Algae Calcium  Vitamin D 2000unit  Elderberry fruit  Dicyclomine 20mg   Garlic 1000mg  multivitamin     The list below reflects the updated PTA list. Comments regarding how patient may be taking medications differently can be found in the Admit Orders Activity  Prior to Admission Medications   Prescriptions Last Dose Informant   Breo Ellipta 200-25 mcg/dose inhaler 3/19/2025 Self   Sig: INHALE 1 PUFF ONCE A DAY 90   Synthroid 100 mcg tablet 3/19/2025 Self   Sig: Take 1 tablet (100 mcg) by mouth once daily in the morning. Take before meals.   acetaminophen (Tylenol) 500 mg tablet 3/19/2025 Self   Sig: Take 2 tablets (1,000 mg) by mouth every 6 hours if needed for mild pain (1 - 3).   albuterol-budesonide (Airsupra) 90-80 mcg/actuation inhaler Unknown Self   Sig: Inhale 2 puffs 4 times a day as needed.   amLODIPine (Norvasc) 5 mg tablet 3/19/2025 Self   Sig: Take 1 tablet (5 mg) by mouth once daily.   buPROPion XL (Wellbutrin XL) 150 mg 24 hr tablet 3/19/2025 Self   Sig: Take 1 tablet (150 mg) by mouth once daily.   chlorthalidone (Hygroton) 25 mg tablet 3/19/2025 Self   Sig: TAKE 1 TABLET BY MOUTH EVERY DAY IN THE MORNING WITH FOOD   citalopram (CeleXA) 40 mg tablet 3/19/2025 Self   Sig: Take 1 tablet (40 mg) by mouth once daily.   etanercept (Enbrel) 50 mg/mL (1 mL) injection 3/8/2025 Self   Sig: Inject 1 mL (50 mg) under the skin 1 (one) time per week.   ezetimibe (Zetia) 10 mg tablet 3/19/2025 Self   Sig: TAKE 1 TABLET BY MOUTH EVERY DAY   folic acid (Folvite) 1 mg tablet 3/19/2025 Self   Sig: TAKE 1 TABLET ORALLY  ONCE A DAY EXCEPT THE DAY OF METHOTREXATE 90 DAYS   ondansetron ODT (Zofran-ODT) 4 mg disintegrating tablet 3/19/2025 Self   Sig: Dissolve 1 tablet (4 mg) in the mouth every 8 hours if needed for vomiting or nausea for up to 7 days.   oxyCODONE-acetaminophen (Percocet) 5-325 mg tablet 3/19/2025 Self   Sig: Take 1 tablet by mouth every 6 hours if needed for severe pain (7 - 10) for up to 3 days.   pantoprazole (ProtoNix) 40 mg EC tablet 3/19/2025 Self   Sig: Take 1 tablet (40 mg) by mouth 2 times a day.   rosuvastatin (Crestor) 40 mg tablet 3/19/2025 Self   Sig: TAKE 1 TABLET BY MOUTH EVERY DAY      Facility-Administered Medications: None        The list below reflects the updated allergy list. Please review each documented allergy for additional clarification and justification.  Allergies  Reviewed by Diya Chang CPhT on 3/20/2025        Severity Reactions Comments    Lipitor [atorvastatin] Not Specified Other Muscle aches    Methotrexate Not Specified GI Upset     Iodine Low Other vomiting            Pharmacy has been updated to [facility].    Sources used to complete the med history include dispense history, PTA medication list, patient interview. Patient is a good historian.    Below are additional concerns with the patient's PTA list.  Patient reports stopping all supplements recently for an upcoming surgery.    Diya Chang CPhT-Adv  Please reach out via [employer] Secure Chat for questions

## 2025-03-20 NOTE — CONSULTS
Consults    Reason For Consult  Hydronephrosis and NAA    History Of Present Illness  Winter Feliz is a 54 y.o. female presenting with abdominal pain and dizziness.  She was originally seen in the emergency room on 3/11/2025 with a chief complaint of lower back pain.  She had a positiv mildly abnormal urinalysis and a negative urine culture.  She underwent a CT scan of the abdomen pelvis which showed moderate right renal edema and extensive right perinephric fat stranding and mild hydronephrosis and hydroureter but no obstructing stone.  It was felt that she may have recently passed a calculus or had a UTI.  She also was found to have a soft tissue mass within the right retroperitoneum adjacent to the psoas muscle but the mass was not causing any obstruction of the ureter.  She was discharged home with pain medication.  She saw Dr. Ennis last week and there was consideration for possibly doing a stent at the same time as an open biopsy of the retroperitoneal mass, however, general surgery decided that the plan for the mass was to have a percutaneous biopsy.  As a result, the patient has been scheduled for a stent placement on 3/25/2025.    On admission now he has a normal white blood cell count with no left shift.  Renal function shows an elevated creatinine of 1.37.  Her urinalysis shows 6-10 white blood cells per high-power field and 1+ bacteria.  A culture is pending.      Past Medical History  She has a past medical history of Asthma, Body mass index (BMI) 33.0-33.9, adult (2022), Cyst of right kidney, Disease of thyroid gland (), Dyslipidemia, Gallstones, GERD (gastroesophageal reflux disease), History of dysphagia, Hypertension, Hypothyroidism (), IGGY (obstructive sleep apnea), Psoriasis, Psoriatic arthritis (Multi), Spondyloarthritis, and Thyroid cancer (Multi).    Surgical History  She has a past surgical history that includes  section, classic (2016); Other surgical history;  Total thyroidectomy (); Hand surgery (Right); Foot surgery; Endometrial ablation (); and  section, low transverse (, , , ).     Social History  She reports that she has never smoked. She has never used smokeless tobacco. She reports that she does not currently use alcohol. She reports that she does not use drugs.    Family History  Family History   Problem Relation Name Age of Onset    Breast cancer Mother Stacey 59        metastatic    Cancer Mother Stacey     No Known Problems Father      No Known Problems Daughter      No Known Problems Son      Hypertension Maternal Grandmother Grandma     Glaucoma Maternal Grandmother Grandma     Heart disease Maternal Grandmother Grandma     Stroke Maternal Grandmother Grandma     No Known Problems Maternal Grandfather      No Known Problems Paternal Grandmother      No Known Problems Paternal Grandfather      No Known Problems Mother's Sister          Allergies  Lipitor [atorvastatin], Methotrexate, and Iodine    Review of Systems    REVIEW OF SYSTEMS  GENERAL:  Negative for malaise, significant weight loss, fever  HEENT:  No changes in hearing or vision, no nose bleeds or other nasal problems and Negative for frequent or significant headaches  NECK:  Negative for lumps, goiter, pain and significant neck swelling  RESPIRATORY:  Negative for cough, wheezing and shortness of breath  CARDIOVASCULAR:  Negative for chest pain, leg swelling and palpitations  GI:  Negative for abdominal discomfort, blood in stools or black stools and change in bowel habits  :  Negative for dysuria, frequency and incontinence  MUSCULOSKELETAL:  Negative for joint pain or swelling, back pain, and muscle pain.  SKIN:  Negative for lesions, rash, and itching  PSYCH:  Negative for sleep disturbance, mood disorder and recent psychosocial stressors  HEMATOLOGY/LYMPHOLOGY:  Negative for prolonged bleeding, bruising easily, and swollen nodes.  ENDOCRINE:  Negative for cold or  "heat intolerance, polyuria, polydipsia and goiter  NEURO: Negative, denies any burning, tingling or numbness     Objective:   Vasc: DP and PT pulses are palpable bilateral.  CFT is less than 3 seconds bilateral.  Skin temperature is warm to cool proximal to distal bilateral.      Neuro:  Light touch is intact to the foot bilateral.  Protective sensation is intact to the foot when tested with the 5.07 SWM bilateral.  There is no clonus noted.  The hallux is downgoing bilateral.      Derm: Nails 1-5 bilateral are intact.  Skin is supple with normal texture and turgor noted.  Webspaces are clean, dry and intact bilateral.  There are no hyperkeratoses, ulcerations, verruca or other lesions noted.      Ortho: Muscle strength is 5/5 for all pedal groups tested.  Ankle joint, subtalar joint, 1st MPJ and lesser MPJ ROM is full and without pain or crepitus.  The foot type is rectus bilateral off weight bearing.  There are no structural deformities noted.       Physical Exam  Awake, alert, oriented.  Obese  HEENT: Normal extraocular movements  Neck: Supple  Lungs: Normal respiratory pattern  Abdomen: Soft but right upper quadrant tenderness.  Extremities: Moves all 4     Last Recorded Vitals  Blood pressure 123/62, pulse 71, temperature 36.6 °C (97.9 °F), temperature source Oral, resp. rate 17, height 1.626 m (5' 4\"), weight 81.6 kg (180 lb), SpO2 95%.    Relevant Results  Results for orders placed or performed during the hospital encounter of 03/19/25 (from the past 24 hours)   CBC and Auto Differential   Result Value Ref Range    WBC 9.3 4.4 - 11.3 x10*3/uL    nRBC 0.0 0.0 - 0.0 /100 WBCs    RBC 5.07 4.00 - 5.20 x10*6/uL    Hemoglobin 13.9 12.0 - 16.0 g/dL    Hematocrit 41.8 36.0 - 46.0 %    MCV 82 80 - 100 fL    MCH 27.4 26.0 - 34.0 pg    MCHC 33.3 32.0 - 36.0 g/dL    RDW 13.2 11.5 - 14.5 %    Platelets 345 150 - 450 x10*3/uL    Neutrophils % 52.1 40.0 - 80.0 %    Immature Granulocytes %, Automated 0.5 0.0 - 0.9 %    " Lymphocytes % 38.3 13.0 - 44.0 %    Monocytes % 6.8 2.0 - 10.0 %    Eosinophils % 1.7 0.0 - 6.0 %    Basophils % 0.6 0.0 - 2.0 %    Neutrophils Absolute 4.83 1.20 - 7.70 x10*3/uL    Immature Granulocytes Absolute, Automated 0.05 0.00 - 0.70 x10*3/uL    Lymphocytes Absolute 3.56 1.20 - 4.80 x10*3/uL    Monocytes Absolute 0.63 0.10 - 1.00 x10*3/uL    Eosinophils Absolute 0.16 0.00 - 0.70 x10*3/uL    Basophils Absolute 0.06 0.00 - 0.10 x10*3/uL   Basic metabolic panel   Result Value Ref Range    Glucose 98 74 - 99 mg/dL    Sodium 138 136 - 145 mmol/L    Potassium 3.2 (L) 3.5 - 5.3 mmol/L    Chloride 101 98 - 107 mmol/L    Bicarbonate 27 21 - 32 mmol/L    Anion Gap 13 10 - 20 mmol/L    Urea Nitrogen 18 6 - 23 mg/dL    Creatinine 1.37 (H) 0.50 - 1.05 mg/dL    eGFR 45 (L) >60 mL/min/1.73m*2    Calcium 9.3 8.6 - 10.3 mg/dL   Hepatic function panel   Result Value Ref Range    Albumin 4.6 3.4 - 5.0 g/dL    Bilirubin, Total 1.6 (H) 0.0 - 1.2 mg/dL    Bilirubin, Direct 0.2 0.0 - 0.3 mg/dL    Alkaline Phosphatase 50 33 - 110 U/L    ALT 14 7 - 45 U/L    AST 16 9 - 39 U/L    Total Protein 7.4 6.4 - 8.2 g/dL   Lipase   Result Value Ref Range    Lipase 23 9 - 82 U/L   Urinalysis with Reflex Culture and Microscopic   Result Value Ref Range    Color, Urine Colorless (N) Light-Yellow, Yellow, Dark-Yellow    Appearance, Urine Clear Clear    Specific Gravity, Urine 1.007 1.005 - 1.035    pH, Urine 7.0 5.0, 5.5, 6.0, 6.5, 7.0, 7.5, 8.0    Protein, Urine NEGATIVE NEGATIVE, 10 (TRACE), 20 (TRACE) mg/dL    Glucose, Urine Normal Normal mg/dL    Blood, Urine NEGATIVE NEGATIVE mg/dL    Ketones, Urine NEGATIVE NEGATIVE mg/dL    Bilirubin, Urine NEGATIVE NEGATIVE mg/dL    Urobilinogen, Urine Normal Normal mg/dL    Nitrite, Urine NEGATIVE NEGATIVE    Leukocyte Esterase, Urine 25 Ginger/uL (A) NEGATIVE   Extra Urine Gray Tube   Result Value Ref Range    Extra Tube Hold for add-ons.    Microscopic Only, Urine   Result Value Ref Range    WBC, Urine  6-10 (A) 1-5, NONE /HPF    RBC, Urine 1-2 NONE, 1-2, 3-5 /HPF    Squamous Epithelial Cells, Urine 1-9 (SPARSE) Reference range not established. /HPF    Bacteria, Urine 1+ (A) NONE SEEN /HPF    Mucus, Urine FEW Reference range not established. /LPF     *Note: Due to a large number of results and/or encounters for the requested time period, some results have not been displayed. A complete set of results can be found in Results Review.     CT abdomen pelvis w IV contrast    Result Date: 3/20/2025  Interpreted By:  Monster Bruce, STUDY: CT ABDOMEN PELVIS W IV CONTRAST;  3/20/2025 1:55 am   INDICATION: Signs/Symptoms:abd pain,  nausea.   COMPARISON: 3/10/2025   ACCESSION NUMBER(S): [id number]   ORDERING CLINICIAN: CHRIS BETH   TECHNIQUE: Contiguous axial images of the abdomen and pelvis were obtained after the intravenous administration of  contrast. Coronal and sagittal reformatted images were obtained from the axial images.   FINDINGS: There is limited evaluation of the lung bases. Stable 5 mm right lower lobe pulmonary nodule and subpleural some nodular opacities in the right lower lobe posteriorly with largest measuring 9 mm. There is also stable mild subpleural nodular opacity left lower lobe measuring up to 7 mm.   A subcentimeter hypodensity in the right hepatic lobe is too small to characterize. The gallbladder is present. No dilatation of the common bile duct.   The pancreas, spleen, and adrenal glands appear unremarkable.   There is persistent prominent asymmetric right perinephric edema. There has been interval decrease in previously described hydronephrosis with mild residual pelviectasis. No evidence of obstructive calculus.   There is stable appearance of previously described 2 cm x 1.4 cm soft tissue nodular lesion in the right retroperitoneum inferior to the level of the kidney.   No evidence of bowel obstruction or acute appendicitis.   There is thinning and diastasis of the midline rectus musculature  with bulge with underlying bowel.   Limited evaluation of the uterus and adnexa. Likely fibroid in the uterine fundus for   Urinary bladder is underdistended and not well evaluated.   Multilevel degenerative change of the lumbar spine.       Persistent prominent asymmetric right perinephric edema. Interval decrease in previously seen hydronephrosis with mild residual pelviectasis. No evidence of obstructive calculus. The described findings may relate to upper urinary tract infection, however other etiologies are not excluded as described on the prior examination and follow-up urology evaluation is recommended and continue short-term progress imaging recommended for further evaluation.   Stable appearance of the previously described 2 cm x 1.4 cm right retroperitoneal soft tissue nodular lesion with malignancy not excluded as described on the prior examination.   Several pulmonary nodules and subpleural nodular opacities in the imaged lower lungs with largest subpleural nodular opacity measuring 9 mm. Outpatient CT chest recommended for further evaluation and to assess for additional pulmonary nodules.     MACRO: Critical Finding:  See findings. Notification was initiated on 3/20/2025 at 2:45 am by  Monster Bruce.  (**-YCF-**) Instructions:   Signed by: Monster Bruce 3/20/2025 2:46 AM Dictation workstation:   [id number]       Assessment/Plan     Hydronephrosis: Her hydronephrosis is better on her current CT scan though there is some increased perinephric edema.  This is consistent possibly with some urine extravasation.  That also could be causing her pain.  She is, however, not septic and has a normal white blood cell count so at this point I would just plan on getting her pain under control and continue with the plan to stent her as an outpatient on Tuesday.    NAA: Her creatinine is elevated to 1.37 from a baseline of 0.8.  This could be due to the urinary extravasation.  At this point we will just follow it and  see if it improves tomorrow.

## 2025-03-20 NOTE — CARE PLAN
The patient's goals for the shift include rest and wait to see the plan    The clinical goals for the shift include Antibiotics

## 2025-03-20 NOTE — ED PROVIDER NOTES
HPI   Chief Complaint   Patient presents with    Abdominal Pain       Patient is a 54-year-old female presenting with a chief complaint of abdominal pain.  Patient was here on 3/10, was diagnosed with hydronephrosis, patient was due to have stenting with urology on Tuesday, but she states she has had worsening pain that is unbearable, she is nauseous, not tolerating p.o. intake, has no vomiting or diarrhea or constipation, pain is in the right side and right flank, patient has no chest pain or shortness of breath, has no other acute complaints at this time      History provided by:  Patient          Patient History   Past Medical History:   Diagnosis Date    Asthma     Body mass index (BMI) 33.0-33.9, adult 2022    BMI 33.0-33.9,adult    Cyst of right kidney     resolved per last U/S    Disease of thyroid gland     Dyslipidemia     Gallstones     GERD (gastroesophageal reflux disease)     History of dysphagia     since thyroidectomy    Hypertension     Hypothyroidism     IGGY (obstructive sleep apnea)     Psoriasis     Psoriatic arthritis (Multi)     Spondyloarthritis     Thyroid cancer (Multi)      Past Surgical History:   Procedure Laterality Date     SECTION, CLASSIC  2016     Section     SECTION, LOW TRANSVERSE  , , ,     ENDOMETRIAL ABLATION  2007    FOOT SURGERY      cyst removed from L  foot between 1st and 2nd MTP    HAND SURGERY Right     R  cyst removal    OTHER SURGICAL HISTORY      hypoglossal nerve stimulator- INSPIRE    TOTAL THYROIDECTOMY      first partial, then total     Family History   Problem Relation Name Age of Onset    Breast cancer Mother Stacey 59        metastatic    Cancer Mother Stacey     No Known Problems Father      No Known Problems Daughter      No Known Problems Son      Hypertension Maternal Grandmother Grandma     Glaucoma Maternal Grandmother Grandma     Heart disease Maternal Grandmother Grandma     Stroke Maternal  Grandmother Grandma     No Known Problems Maternal Grandfather      No Known Problems Paternal Grandmother      No Known Problems Paternal Grandfather      No Known Problems Mother's Sister       Social History     Tobacco Use    Smoking status: Never    Smokeless tobacco: Never   Vaping Use    Vaping status: Never Used   Substance Use Topics    Alcohol use: Not Currently    Drug use: Never       Physical Exam   ED Triage Vitals [03/19/25 2349]   Temperature Heart Rate Respirations BP   36.4 °C (97.5 °F) 91 18 (!) 166/102      Pulse Ox Temp src Heart Rate Source Patient Position   99 % -- -- --      BP Location FiO2 (%)     -- --       Physical Exam  Vitals and nursing note reviewed. Exam conducted with a chaperone present.   Constitutional:       General: She is not in acute distress.     Appearance: She is well-developed and normal weight. She is not ill-appearing, toxic-appearing or diaphoretic.   HENT:      Head: Normocephalic and atraumatic.      Mouth/Throat:      Mouth: Mucous membranes are moist.      Pharynx: Oropharynx is clear.   Eyes:      Extraocular Movements: Extraocular movements intact.      Pupils: Pupils are equal, round, and reactive to light.   Cardiovascular:      Rate and Rhythm: Normal rate and regular rhythm.      Heart sounds: Normal heart sounds.   Pulmonary:      Effort: Pulmonary effort is normal.      Breath sounds: Normal breath sounds.   Abdominal:      General: Abdomen is flat. Bowel sounds are normal.      Palpations: Abdomen is soft.      Tenderness: There is abdominal tenderness in the right upper quadrant. There is right CVA tenderness.   Skin:     General: Skin is warm and dry.      Capillary Refill: Capillary refill takes less than 2 seconds.   Neurological:      General: No focal deficit present.      Mental Status: She is alert and oriented to person, place, and time.   Psychiatric:         Mood and Affect: Mood normal.         Behavior: Behavior normal.           ED Course &  [facility]   Diagnoses as of 03/20/25 0455   Abdominal pain, generalized   Lower urinary tract infectious disease   NAA (acute kidney injury) (CMS-[facility])                 No data recorded     [place] Coma Scale Score: 15 (03/19/25 2349 : Andrés Silva RN)                           Medical Decision Making  Patient seen and evaluated at bedside, patient is in no acute distress.  I will order a CBC, BMP, hepatic function panel, lipase, urinalysis, CT abdomen pelvis with IV contrast, give the patient 1 L of normal saline, 4 mg of morphine, 4 mg of Zofran. Differential diagnosis includes but is not limited to electrolyte abnormality, UTI, pyelonephritis, ureterolithiasis,  Patient CBC shows no abnormalities, BMP showed potassium 3.2, this was replenished with IV potassium, patient does have an acute kidney injury with a creatinine 1.37, GFR 45, bilirubin 1.6, urinalysis shows 6-10 whites, 1+ bacteria, leukocyte Estrace, will be given IV Rocephin, CT report as below, patient will be admitted to the general medicine team for further evaluation and treatment.  Diagnosis: Abdominal pain, NAA, lower UTI  CT abdomen pelvis w IV contrast   Final Result    Persistent prominent asymmetric right perinephric edema. Interval    decrease in previously seen hydronephrosis with mild residual    pelviectasis. No evidence of obstructive calculus. The described    findings may relate to upper urinary tract infection, however other    etiologies are not excluded as described on the prior examination and    follow-up urology evaluation is recommended and continue short-term    progress imaging recommended for further evaluation.          Stable appearance of the previously described 2 cm x 1.4 cm right    retroperitoneal soft tissue nodular lesion with malignancy not    excluded as described on the prior examination.          Several pulmonary nodules and subpleural nodular opacities in the    imaged lower lungs with largest subpleural nodular  opacity measuring    9 mm. Outpatient CT chest recommended for further evaluation and to    assess for additional pulmonary nodules.                MACRO:    Critical Finding:  See findings. Notification was initiated on    3/20/2025 at 2:45 am by  Monster Bruce.  (**-YCF-**) Instructions:          Signed by: Monster Bruce 3/20/2025 2:46 AM    Dictation workstation:   [id number]     Results for orders placed or performed during the hospital encounter of 03/19/25  -CBC and Auto Differential:   Collection Time: 03/20/25 12:14 AM       Result                      Value             Ref Range           WBC                         9.3               4.4 - 11.3 x*       nRBC                        0.0               0.0 - 0.0 /1*       RBC                         5.07              4.00 - 5.20 *       Hemoglobin                  13.9              12.0 - 16.0 *       Hematocrit                  41.8              36.0 - 46.0 %       MCV                         82                80 - 100 fL         MCH                         27.4              26.0 - 34.0 *       MCHC                        33.3              32.0 - 36.0 *       RDW                         13.2              11.5 - 14.5 %       Platelets                   345               150 - 450 x1*       Neutrophils %               52.1              40.0 - 80.0 %       Immature Granulocytes *     0.5               0.0 - 0.9 %         Lymphocytes %               38.3              13.0 - 44.0 %       Monocytes %                 6.8               2.0 - 10.0 %        Eosinophils %               1.7               0.0 - 6.0 %         Basophils %                 0.6               0.0 - 2.0 %         Neutrophils Absolute        4.83              1.20 - 7.70 *       Immature Granulocytes *     0.05              0.00 - 0.70 *       Lymphocytes Absolute        3.56              1.20 - 4.80 *       Monocytes Absolute          0.63              0.10 - 1.00 *       Eosinophils Absolute         0.16              0.00 - 0.70 *       Basophils Absolute          0.06              0.00 - 0.10 *  -Basic metabolic panel:   Collection Time: 03/20/25 12:14 AM       Result                      Value             Ref Range           Glucose                     98                74 - 99 mg/dL       Sodium                      138               136 - 145 mm*       Potassium                   3.2 (L)           3.5 - 5.3 mm*       Chloride                    101               98 - 107 mmo*       Bicarbonate                 27                21 - 32 mmol*       Anion Gap                   13                10 - 20 mmol*       Urea Nitrogen               18                6 - 23 mg/dL        Creatinine                  1.37 (H)          0.50 - 1.05 *       eGFR                        45 (L)            >60 mL/min/1*       Calcium                     9.3               8.6 - 10.3 m*  -Hepatic function panel:   Collection Time: 03/20/25 12:14 AM       Result                      Value             Ref Range           Albumin                     4.6               3.4 - 5.0 g/*       Bilirubin, Total            1.6 (H)           0.0 - 1.2 mg*       Bilirubin, Direct           0.2               0.0 - 0.3 mg*       Alkaline Phosphatase        50                33 - 110 U/L        ALT                         14                7 - 45 U/L          AST                         16                9 - 39 U/L          Total Protein               7.4               6.4 - 8.2 g/*  -Lipase:   Collection Time: 03/20/25 12:14 AM       Result                      Value             Ref Range           Lipase                      23                9 - 82 U/L     -Urinalysis with Reflex Culture and Microscopic:   Collection Time: 03/20/25  1:25 AM       Result                      Value             Ref Range           Color, Urine                Colorless (N)     Light-Yellow*       Appearance, Urine           Clear             Clear                Specific Gravity, Urine     1.007             1.005 - 1.035       pH, Urine                   7.0               5.0, 5.5, 6.*       Protein, Urine              NEGATIVE          NEGATIVE, 10*       Glucose, Urine              Normal            Normal mg/dL        Blood, Urine                NEGATIVE          NEGATIVE mg/*       Ketones, Urine              NEGATIVE          NEGATIVE mg/*       Bilirubin, Urine            NEGATIVE          NEGATIVE mg/*       Urobilinogen, Urine         Normal            Normal mg/dL        Nitrite, Urine              NEGATIVE          NEGATIVE            Leukocyte Esterase, Ur*     25 Ginger/uL (A)     NEGATIVE       -Microscopic Only, Urine:   Collection Time: 03/20/25  1:25 AM       Result                      Value             Ref Range           WBC, Urine                  6-10 (A)          1-5, NONE /H*       RBC, Urine                  1-2               NONE, 1-2, 3*       Squamous Epithelial Ce*     1-9 (SPARSE)      Reference ra*       Bacteria, Urine             1+ (A)            NONE SEEN /H*       Mucus, Urine                FEW               Reference ra*  .        Procedure  Procedures  Sections of this report were created using voice-to-text technology and may contain errors in translation    Anderson Bonds DO  Emergency Medicine         Anderson Bonds DO  03/20/25 0459

## 2025-03-21 ENCOUNTER — PHARMACY VISIT (OUTPATIENT)
Dept: PHARMACY | Facility: CLINIC | Age: 55
End: 2025-03-21
Payer: MEDICARE

## 2025-03-21 VITALS
WEIGHT: 167.99 LBS | DIASTOLIC BLOOD PRESSURE: 78 MMHG | RESPIRATION RATE: 18 BRPM | SYSTOLIC BLOOD PRESSURE: 150 MMHG | OXYGEN SATURATION: 96 % | HEIGHT: 64 IN | HEART RATE: 77 BPM | BODY MASS INDEX: 28.68 KG/M2 | TEMPERATURE: 97.7 F

## 2025-03-21 PROBLEM — N17.9 AKI (ACUTE KIDNEY INJURY): Status: RESOLVED | Noted: 2025-03-20 | Resolved: 2025-03-21

## 2025-03-21 LAB
ANION GAP SERPL CALCULATED.3IONS-SCNC: 11 MMOL/L (ref 10–20)
BACTERIA UR CULT: NORMAL
BUN SERPL-MCNC: 9 MG/DL (ref 6–23)
CALCIUM SERPL-MCNC: 8.7 MG/DL (ref 8.6–10.3)
CHLORIDE SERPL-SCNC: 102 MMOL/L (ref 98–107)
CO2 SERPL-SCNC: 29 MMOL/L (ref 21–32)
CREAT SERPL-MCNC: 0.84 MG/DL (ref 0.5–1.05)
EGFRCR SERPLBLD CKD-EPI 2021: 82 ML/MIN/1.73M*2
GLUCOSE SERPL-MCNC: 94 MG/DL (ref 74–99)
HOLD SPECIMEN: NORMAL
POTASSIUM SERPL-SCNC: 3.3 MMOL/L (ref 3.5–5.3)
SODIUM SERPL-SCNC: 139 MMOL/L (ref 136–145)

## 2025-03-21 PROCEDURE — 36415 COLL VENOUS BLD VENIPUNCTURE: CPT | Performed by: INTERNAL MEDICINE

## 2025-03-21 PROCEDURE — 9420000001 HC RT PATIENT EDUCATION 5 MIN

## 2025-03-21 PROCEDURE — RXMED WILLOW AMBULATORY MEDICATION CHARGE

## 2025-03-21 PROCEDURE — 2500000004 HC RX 250 GENERAL PHARMACY W/ HCPCS (ALT 636 FOR OP/ED): Performed by: HOSPITALIST

## 2025-03-21 PROCEDURE — 80048 BASIC METABOLIC PNL TOTAL CA: CPT | Performed by: INTERNAL MEDICINE

## 2025-03-21 PROCEDURE — 2500000002 HC RX 250 W HCPCS SELF ADMINISTERED DRUGS (ALT 637 FOR MEDICARE OP, ALT 636 FOR OP/ED): Performed by: HOSPITALIST

## 2025-03-21 PROCEDURE — 99239 HOSP IP/OBS DSCHRG MGMT >30: CPT | Performed by: INTERNAL MEDICINE

## 2025-03-21 PROCEDURE — 94640 AIRWAY INHALATION TREATMENT: CPT

## 2025-03-21 PROCEDURE — 2500000001 HC RX 250 WO HCPCS SELF ADMINISTERED DRUGS (ALT 637 FOR MEDICARE OP): Performed by: HOSPITALIST

## 2025-03-21 RX ORDER — OXYCODONE HYDROCHLORIDE 5 MG/1
5 TABLET ORAL EVERY 4 HOURS PRN
Qty: 15 TABLET | Refills: 0 | Status: SHIPPED | OUTPATIENT
Start: 2025-03-21 | End: 2025-03-26

## 2025-03-21 RX ORDER — ONDANSETRON 4 MG/1
4 TABLET, ORALLY DISINTEGRATING ORAL EVERY 8 HOURS PRN
Qty: 12 TABLET | Refills: 0 | Status: SHIPPED | OUTPATIENT
Start: 2025-03-21

## 2025-03-21 RX ORDER — POTASSIUM CHLORIDE 20 MEQ/1
20 TABLET, EXTENDED RELEASE ORAL 2 TIMES DAILY
Qty: 6 TABLET | Refills: 0 | Status: SHIPPED | OUTPATIENT
Start: 2025-03-21 | End: 2025-03-24

## 2025-03-21 RX ORDER — POLYETHYLENE GLYCOL 3350 17 G/17G
17 POWDER, FOR SOLUTION ORAL DAILY PRN
Start: 2025-03-21

## 2025-03-21 RX ORDER — DICYCLOMINE HYDROCHLORIDE 10 MG/1
10 CAPSULE ORAL EVERY 6 HOURS PRN
Start: 2025-03-21

## 2025-03-21 RX ORDER — CEFUROXIME AXETIL 500 MG/1
500 TABLET ORAL 2 TIMES DAILY
Qty: 20 TABLET | Refills: 0 | Status: SHIPPED | OUTPATIENT
Start: 2025-03-21 | End: 2025-03-31

## 2025-03-21 RX ADMIN — SODIUM CHLORIDE 100 ML/HR: 900 INJECTION, SOLUTION INTRAVENOUS at 00:12

## 2025-03-21 RX ADMIN — EZETIMIBE 10 MG: 10 TABLET ORAL at 09:32

## 2025-03-21 RX ADMIN — HEPARIN SODIUM 5000 UNITS: 5000 INJECTION INTRAVENOUS; SUBCUTANEOUS at 05:48

## 2025-03-21 RX ADMIN — AMLODIPINE BESYLATE 5 MG: 5 TABLET ORAL at 09:32

## 2025-03-21 RX ADMIN — LEVOTHYROXINE SODIUM 100 MCG: 0.1 TABLET ORAL at 05:48

## 2025-03-21 RX ADMIN — OXYCODONE HYDROCHLORIDE 5 MG: 5 TABLET ORAL at 04:11

## 2025-03-21 RX ADMIN — ROSUVASTATIN 40 MG: 20 TABLET, FILM COATED ORAL at 09:32

## 2025-03-21 RX ADMIN — FLUTICASONE FUROATE AND VILANTEROL TRIFENATATE 1 PUFF: 200; 25 POWDER RESPIRATORY (INHALATION) at 07:38

## 2025-03-21 RX ADMIN — PANTOPRAZOLE SODIUM 40 MG: 40 TABLET, DELAYED RELEASE ORAL at 09:32

## 2025-03-21 RX ADMIN — BUPROPION HYDROCHLORIDE 150 MG: 150 TABLET, EXTENDED RELEASE ORAL at 09:32

## 2025-03-21 RX ADMIN — CEFTRIAXONE SODIUM 1 G: 1 INJECTION, SOLUTION INTRAVENOUS at 04:06

## 2025-03-21 RX ADMIN — CITALOPRAM 40 MG: 40 TABLET, FILM COATED ORAL at 09:32

## 2025-03-21 ASSESSMENT — COGNITIVE AND FUNCTIONAL STATUS - GENERAL
MOBILITY SCORE: 24
DAILY ACTIVITIY SCORE: 24

## 2025-03-21 ASSESSMENT — PAIN SCALES - GENERAL
PAINLEVEL_OUTOF10: 2
PAINLEVEL_OUTOF10: 7

## 2025-03-21 ASSESSMENT — PAIN DESCRIPTION - DESCRIPTORS
DESCRIPTORS: ACHING
DESCRIPTORS: ACHING

## 2025-03-21 ASSESSMENT — PAIN - FUNCTIONAL ASSESSMENT
PAIN_FUNCTIONAL_ASSESSMENT: 0-10
PAIN_FUNCTIONAL_ASSESSMENT: 0-10

## 2025-03-21 NOTE — CARE PLAN
The patient's goals for the shift include rest and wait to see the plan    The clinical goals for the shift include pt pain will be manageable      Problem: Pain  Goal: Takes deep breaths with improved pain control throughout the shift  Outcome: Progressing  Goal: Turns in bed with improved pain control throughout the shift  Outcome: Progressing  Goal: Walks with improved pain control throughout the shift  Outcome: Progressing  Goal: Performs ADL's with improved pain control throughout shift  Outcome: Progressing  Goal: Free from opioid side effects throughout the shift  Outcome: Progressing  Goal: Free from acute confusion related to pain meds throughout the shift  Outcome: Progressing     Problem: Fall/Injury  Goal: Not fall by end of shift  Outcome: Progressing  Goal: Be free from injury by end of the shift  Outcome: Progressing  Goal: Verbalize understanding of personal risk factors for fall in the hospital  Outcome: Progressing  Goal: Verbalize understanding of risk factor reduction measures to prevent injury from fall in the home  Outcome: Progressing  Goal: Use assistive devices by end of the shift  Outcome: Progressing  Goal: Pace activities to prevent fatigue by end of the shift  Outcome: Progressing

## 2025-03-21 NOTE — NURSING NOTE
Assumed care of patient. Patient is resting in bed with no needs at this time. She is about to order her breakfast. Call light is in place will continue to monitor.

## 2025-03-21 NOTE — DISCHARGE SUMMARY
Discharge Diagnosis  NAA (acute kidney injury) (CMS-HCC)  Right-sided hydronephrosis  Right-sided pyelonephritis  Dehydration  Hypokalemia  Hypercalcemia  Accidental finding of soft retroperitoneal mass  Possible small liver cyst cyst    Notable small gallbladder polyp  Lung nodules    Issues Requiring Follow-Up    Right-sided hydronephrosis  Accidental finding of soft retroperitoneal mass  Possible small liver cyst cyst    Notable small gallbladder polyp  Lung nodules    Discharge Meds     Medication List      START taking these medications     cefuroxime 500 mg tablet; Commonly known as: Ceftin; Take 1 tablet (500   mg) by mouth 2 times a day for 10 days.   dicyclomine 10 mg capsule; Commonly known as: Bentyl; Take 1 capsule (10   mg) by mouth every 6 hours if needed (Muscle spasms).; Replaces:   dicyclomine 20 mg tablet   ergocalciferol 1250 mcg (50,000 units) capsule; Commonly known as:   Vitamin D-2; TAKE 1 CAPSULE (82834 UNITS) BY MOUTH ONE TIME PER WEEK   nabumetone 500 mg tablet; Commonly known as: Relafen; Take 1 tablet (500   mg) by mouth 2 times a day as needed for moderate pain (4 - 6).   oxyCODONE 5 mg immediate release tablet; Commonly known as: Roxicodone;   Take 1 tablet (5 mg) by mouth every 4 hours if needed for severe pain (7 -   10) for up to 5 days.   polyethylene glycol 17 gram packet; Commonly known as: Glycolax,   Miralax; Take 17 g by mouth once daily as needed (constipation).   potassium chloride CR 20 mEq ER tablet; Commonly known as: Klor-Con M20;   Take 1 tablet (20 mEq) by mouth 2 times a day for 3 days. Do not crush or   chew.     CONTINUE taking these medications     acetaminophen 500 mg tablet; Commonly known as: Tylenol   Airsupra 90-80 mcg/actuation inhaler; Generic drug: albuterol-budesonide   amLODIPine 5 mg tablet; Commonly known as: Norvasc   Breo Ellipta 200-25 mcg/dose inhaler; Generic drug: fluticasone   furoate-vilanteroL   buPROPion  mg 24 hr tablet; Commonly known  as: Wellbutrin XL   citalopram 40 mg tablet; Commonly known as: CeleXA   etanercept 50 mg/mL (1 mL) injection; Commonly known as: Enbrel; Inject   1 mL (50 mg) under the skin 1 (one) time per week.   ezetimibe 10 mg tablet; Commonly known as: Zetia; TAKE 1 TABLET BY MOUTH   EVERY DAY   folic acid 1 mg tablet; Commonly known as: Folvite; TAKE 1 TABLET ORALLY   ONCE A DAY EXCEPT THE DAY OF METHOTREXATE 90 DAYS   ondansetron ODT 4 mg disintegrating tablet; Commonly known as:   Zofran-ODT; Dissolve 1 tablet (4 mg) in the mouth every 8 hours if needed   for vomiting or nausea.   pantoprazole 40 mg EC tablet; Commonly known as: ProtoNix   rosuvastatin 40 mg tablet; Commonly known as: Crestor; TAKE 1 TABLET BY   MOUTH EVERY DAY   Synthroid 100 mcg tablet; Generic drug: levothyroxine     STOP taking these medications     chlorthalidone 25 mg tablet; Commonly known as: Hygroton   dicyclomine 20 mg tablet; Commonly known as: Bentyl; Replaced by:   dicyclomine 10 mg capsule   oxyCODONE-acetaminophen 5-325 mg tablet; Commonly known as: Percocet       Test Results Pending At Discharge  Pending Labs       No current pending labs.            Hospital Course   History:  Winter Feliz is a 54 y.o. female presenting with abdominal pain and dizziness.  She was originally seen in the emergency room on 3/11/2025 with a chief complaint of lower back pain.  She had a positiv mildly abnormal urinalysis and a negative urine culture.  She underwent a CT scan of the abdomen pelvis which showed moderate right renal edema and extensive right perinephric fat stranding and mild hydronephrosis and hydroureter but no obstructing stone.  It was felt that she may have recently passed a calculus or had a UTI.  She also was found to have a soft tissue mass within the right retroperitoneum adjacent to the psoas muscle but the mass was not causing any obstruction of the ureter.  She was discharged home with pain medication.  She saw Dr. Ennis last week  and there was consideration for possibly doing a stent at the same time as an open biopsy of the retroperitoneal mass, however, general surgery decided that the plan for the mass was to have a percutaneous biopsy.  As a result, the patient has been scheduled for a stent placement on 3/25/2025.     On admission now he has a normal white blood cell count with no left shift.  Renal function shows an elevated creatinine of 1.37.  Her urinalysis shows 6-10 white blood cells per high-power field and 1+ bacteria.  A culture is pending.    CT of the abdomen:  Persistent prominent asymmetric right perinephric edema. Interval  decrease in previously seen hydronephrosis with mild residual  pelviectasis. No evidence of obstructive calculus. The described  findings may relate to upper urinary tract infection, however other  etiologies are not excluded as described on the prior examination and  follow-up urology evaluation is recommended and continue short-term  progress imaging recommended for further evaluation.      Stable appearance of the previously described 2 cm x 1.4 cm right  retroperitoneal soft tissue nodular lesion with malignancy not  excluded as described on the prior examination.      Several pulmonary nodules and subpleural nodular opacities in the  imaged lower lungs with largest subpleural nodular opacity measuring  9 mm. Outpatient CT chest recommended for further evaluation and to  assess for additional pulmonary nodules.    Patient was admitted to medical floor.  She received IV hydration IV Rocephin.  Hypokalemia and hypercalcemia resolved.  Patient symptoms controlled with oral medications.  She is not septic.  Seen by Dr. Waggoner who feels that if his symptoms controlled with oral medications she can be safely discharged home and have an outpatient cystoscopy with stent placement on Tuesday.    Discussed accidental findings on CAT scan of the abdomen and recent ultrasound of the right upper quadrant  including soft mass in the retroperitoneal space, bibasilar lung nodules and the possible hepatic cyst as well as possible small gallbladder polyp.  Patient needs outpatient workup including CAT scan of the chest with IV contrast and MRI of the abdomen.  Patient will follow-up with her primary care physician, Dr Null and her urologistas outpatient.  Total time spent with patient today coordinating discharge, including exam, discussion, paperwork 33 minutes.    Pertinent Physical Exam At Time of Discharge  Physical Exam    Outpatient Follow-Up  Future Appointments   Date Time Provider Department Center   4/3/2025 10:00 AM Leyda Nelson MD [id number] Psychiatric   4/9/2025 11:00 AM KAILASH [facility]   7/17/2025  1:40 PM Camille Monson MD [id number] Psychiatric         Natasha Smith MD

## 2025-03-21 NOTE — CARE PLAN
Problem: Pain  Goal: Takes deep breaths with improved pain control throughout the shift  Outcome: Progressing  Goal: Turns in bed with improved pain control throughout the shift  Outcome: Progressing  Goal: Walks with improved pain control throughout the shift  Outcome: Progressing  Goal: Performs ADL's with improved pain control throughout shift  Outcome: Progressing  Goal: Free from opioid side effects throughout the shift  Outcome: Progressing  Goal: Free from acute confusion related to pain meds throughout the shift  Outcome: Progressing   The patient's goals for the shift include rest and wait to see the plan    The clinical goals for the shift include pt pain will be manageable

## 2025-04-03 ENCOUNTER — OFFICE VISIT (OUTPATIENT)
Dept: SURGERY | Facility: CLINIC | Age: 55
End: 2025-04-03
Payer: COMMERCIAL

## 2025-04-03 VITALS
HEART RATE: 75 BPM | OXYGEN SATURATION: 100 % | WEIGHT: 178 LBS | DIASTOLIC BLOOD PRESSURE: 72 MMHG | SYSTOLIC BLOOD PRESSURE: 118 MMHG | TEMPERATURE: 98 F | BODY MASS INDEX: 31.54 KG/M2 | HEIGHT: 63 IN

## 2025-04-03 DIAGNOSIS — R19.00 RETROPERITONEAL MASS: Primary | ICD-10-CM

## 2025-04-03 DIAGNOSIS — K82.4 GALL BLADDER POLYP: ICD-10-CM

## 2025-04-03 PROCEDURE — 99214 OFFICE O/P EST MOD 30 MIN: CPT | Performed by: STUDENT IN AN ORGANIZED HEALTH CARE EDUCATION/TRAINING PROGRAM

## 2025-04-03 PROCEDURE — 3078F DIAST BP <80 MM HG: CPT | Performed by: STUDENT IN AN ORGANIZED HEALTH CARE EDUCATION/TRAINING PROGRAM

## 2025-04-03 PROCEDURE — 3074F SYST BP LT 130 MM HG: CPT | Performed by: STUDENT IN AN ORGANIZED HEALTH CARE EDUCATION/TRAINING PROGRAM

## 2025-04-03 PROCEDURE — 99204 OFFICE O/P NEW MOD 45 MIN: CPT | Performed by: STUDENT IN AN ORGANIZED HEALTH CARE EDUCATION/TRAINING PROGRAM

## 2025-04-03 PROCEDURE — 1036F TOBACCO NON-USER: CPT | Performed by: STUDENT IN AN ORGANIZED HEALTH CARE EDUCATION/TRAINING PROGRAM

## 2025-04-03 PROCEDURE — 3008F BODY MASS INDEX DOCD: CPT | Performed by: STUDENT IN AN ORGANIZED HEALTH CARE EDUCATION/TRAINING PROGRAM

## 2025-04-03 RX ORDER — OXYCODONE HYDROCHLORIDE 5 MG/1
5 TABLET ORAL
COMMUNITY
Start: 2025-03-21

## 2025-04-03 RX ORDER — SCOPOLAMINE 1 MG/3D
1 PATCH, EXTENDED RELEASE TRANSDERMAL
COMMUNITY
Start: 2025-03-28 | End: 2025-05-27

## 2025-04-03 RX ORDER — POTASSIUM CHLORIDE 750 MG/1
TABLET, EXTENDED RELEASE ORAL
COMMUNITY
Start: 2025-04-02

## 2025-04-03 ASSESSMENT — ENCOUNTER SYMPTOMS
VOMITING: 0
FEVER: 0
SPEECH DIFFICULTY: 0
SORE THROAT: 0
BRUISES/BLEEDS EASILY: 0
WOUND: 0
UNEXPECTED WEIGHT CHANGE: 0
BLOOD IN STOOL: 0
FACIAL ASYMMETRY: 0
DIARRHEA: 0
NAUSEA: 0
ABDOMINAL PAIN: 1
CHILLS: 0
TROUBLE SWALLOWING: 0
ADENOPATHY: 0
HEADACHES: 0
VOICE CHANGE: 0
ARTHRALGIAS: 0
PALPITATIONS: 0
HEMATURIA: 1
SHORTNESS OF BREATH: 0
CHEST TIGHTNESS: 0

## 2025-04-03 ASSESSMENT — PAIN SCALES - GENERAL: PAINLEVEL_OUTOF10: 6

## 2025-04-03 NOTE — PROGRESS NOTES
History Of Present Illness  Winter Feliz is a 54 y.o. female presenting for evaluation of a gallbladder polyp and also retroperitoneal mass.  Was admitted to [facility] on  with right abdominal pain and back pain and found to have right-sided hydronephrosis, pyelonephritis with NAA and incidental findings of a right sided retroperitoneal mass and gallbladder polyp.  She was eventually discharged and underwent a ureteral stent placement last week.  She is having hematuria and reports the pain is slightly improved but she still having right abdominal pain.  Has a history of thyroid cancer and skin cancer of the left leg that was excised.  Has not recently followed up with a dermatologist.     Past Medical History  She has a past medical history of Asthma, Body mass index (BMI) 33.0-33.9, adult (2022), Cyst of right kidney, Disease of thyroid gland (), Dyslipidemia, Gallstones, GERD (gastroesophageal reflux disease), History of dysphagia, Hypertension, Hypothyroidism (), IGGY (obstructive sleep apnea), PONV (postoperative nausea and vomiting) (2007), Psoriasis, Psoriatic arthritis (Multi), Snoring, Spondyloarthritis, and Thyroid cancer (Multi).    Surgical History  She has a past surgical history that includes  section, classic (2016); Other surgical history; Total thyroidectomy (); Hand surgery (Right); Foot surgery; Endometrial ablation (); and  section, low transverse (, , , ).     Social History  She reports that she has never smoked. She has never used smokeless tobacco. She reports that she does not currently use alcohol. She reports that she does not use drugs.    Family History  Family History   Problem Relation Name Age of Onset    Breast cancer Mother Stacey 59        metastatic    Cancer Mother Stacey     Alcohol abuse Father Sadiq Mccarty     No Known Problems Daughter      No Known Problems Son      Hypertension Maternal Grandmother  Grandma     Glaucoma Maternal Grandmother Grandma     Heart disease Maternal Grandmother Grandma     Stroke Maternal Grandmother Grandma     No Known Problems Maternal Grandfather      No Known Problems Paternal Grandmother      No Known Problems Paternal Grandfather      No Known Problems Mother's Sister          Allergies  Lipitor [atorvastatin], Methotrexate, and Iodine    Review of Systems   Constitutional:  Negative for chills, fever and unexpected weight change.   HENT:  Negative for sneezing, sore throat, trouble swallowing and voice change.    Respiratory:  Negative for chest tightness and shortness of breath.    Cardiovascular:  Negative for chest pain and palpitations.   Gastrointestinal:  Positive for abdominal pain. Negative for blood in stool, diarrhea, nausea and vomiting.   Endocrine: Negative for cold intolerance and heat intolerance.   Genitourinary:  Positive for hematuria.   Musculoskeletal:  Negative for arthralgias and gait problem.   Skin:  Negative for rash and wound.   Neurological:  Negative for facial asymmetry, speech difficulty and headaches.   Hematological:  Negative for adenopathy. Does not bruise/bleed easily.   Psychiatric/Behavioral:  Negative for self-injury and suicidal ideas.         Physical Exam  Vitals and nursing note reviewed.   Constitutional:       Appearance: Normal appearance.   HENT:      Head: Normocephalic and atraumatic.      Mouth/Throat:      Mouth: Mucous membranes are moist.      Pharynx: Oropharynx is clear.   Eyes:      Extraocular Movements: Extraocular movements intact.      Pupils: Pupils are equal, round, and reactive to light.   Cardiovascular:      Rate and Rhythm: Normal rate and regular rhythm.      Pulses: Normal pulses.   Pulmonary:      Effort: Pulmonary effort is normal.      Breath sounds: Normal breath sounds.   Abdominal:      General: There is no distension.      Palpations: Abdomen is soft.      Tenderness: There is abdominal tenderness (Right  "sided).   Musculoskeletal:      Cervical back: Normal range of motion and neck supple.   Skin:     General: Skin is warm and dry.   Neurological:      General: No focal deficit present.      Mental Status: She is alert and oriented to person, place, and time.   Psychiatric:         Mood and Affect: Mood normal.         Behavior: Behavior normal.          Last Recorded Vitals  Blood pressure 118/72, pulse 75, temperature 36.7 °C (98 °F), temperature source Oral, height 1.6 m (5' 3\"), weight 80.7 kg (178 lb), SpO2 100%.    Relevant Results  Reviewed multiple different quadrant ultrasound and CT scans of the abdomen and pelvis dating back to 2008  Discussed patient with Dr. Ennis     Assessment/Plan   Problem List Items Addressed This Visit    None  Visit Diagnoses         Codes    Retroperitoneal mass    -  Primary R19.00    Relevant Orders    IR biopsy abdomen    Gall bladder polyp     K82.4          In regards to the gallbladder polyp, this has been seen intermittently on right upper quadrant ultrasounds for numerous years.  The first time it was noted in ultrasound report was in 2012 at 3 mm, and that is what it is currently showing.  I think for now the gallbladder polyp is benign, it has not grown, is not symptomatic and can be observed at this time.    The retroperitoneal mass is a new finding from previous CT scan of the abdomen.  A CT scan in 2011 was performed which did not report any abnormality, however there does appear to be a small 5 mm lesion which could be related to this mass which is 2 cm now.  While this area is retroperitoneal, it is not large enough or close enough to be causing any mass effect to the ureter, so unlikely to be causing the pyelonephritis and hydronephrosis.  I have recommended that we obtain a percutaneous biopsy of this mass for further workup.  Encouraged her to keep follow-up with the urologist especially if her symptoms do not improve.  I will call her once the biopsy results " have returned to discuss next steps.      Leyda Nelson MD

## 2025-04-03 NOTE — H&P (VIEW-ONLY)
History Of Present Illness  Winter Feliz is a 54 y.o. female presenting for evaluation of a gallbladder polyp and also retroperitoneal mass.  Was admitted to [facility] on  with right abdominal pain and back pain and found to have right-sided hydronephrosis, pyelonephritis with NAA and incidental findings of a right sided retroperitoneal mass and gallbladder polyp.  She was eventually discharged and underwent a ureteral stent placement last week.  She is having hematuria and reports the pain is slightly improved but she still having right abdominal pain.  Has a history of thyroid cancer and skin cancer of the left leg that was excised.  Has not recently followed up with a dermatologist.     Past Medical History  She has a past medical history of Asthma, Body mass index (BMI) 33.0-33.9, adult (2022), Cyst of right kidney, Disease of thyroid gland (), Dyslipidemia, Gallstones, GERD (gastroesophageal reflux disease), History of dysphagia, Hypertension, Hypothyroidism (), IGGY (obstructive sleep apnea), PONV (postoperative nausea and vomiting) (2007), Psoriasis, Psoriatic arthritis (Multi), Snoring, Spondyloarthritis, and Thyroid cancer (Multi).    Surgical History  She has a past surgical history that includes  section, classic (2016); Other surgical history; Total thyroidectomy (); Hand surgery (Right); Foot surgery; Endometrial ablation (); and  section, low transverse (, , , ).     Social History  She reports that she has never smoked. She has never used smokeless tobacco. She reports that she does not currently use alcohol. She reports that she does not use drugs.    Family History  Family History   Problem Relation Name Age of Onset    Breast cancer Mother Stacey 59        metastatic    Cancer Mother Stacey     Alcohol abuse Father Sadiq Mccarty     No Known Problems Daughter      No Known Problems Son      Hypertension Maternal Grandmother  Grandma     Glaucoma Maternal Grandmother Grandma     Heart disease Maternal Grandmother Grandma     Stroke Maternal Grandmother Grandma     No Known Problems Maternal Grandfather      No Known Problems Paternal Grandmother      No Known Problems Paternal Grandfather      No Known Problems Mother's Sister          Allergies  Lipitor [atorvastatin], Methotrexate, and Iodine    Review of Systems   Constitutional:  Negative for chills, fever and unexpected weight change.   HENT:  Negative for sneezing, sore throat, trouble swallowing and voice change.    Respiratory:  Negative for chest tightness and shortness of breath.    Cardiovascular:  Negative for chest pain and palpitations.   Gastrointestinal:  Positive for abdominal pain. Negative for blood in stool, diarrhea, nausea and vomiting.   Endocrine: Negative for cold intolerance and heat intolerance.   Genitourinary:  Positive for hematuria.   Musculoskeletal:  Negative for arthralgias and gait problem.   Skin:  Negative for rash and wound.   Neurological:  Negative for facial asymmetry, speech difficulty and headaches.   Hematological:  Negative for adenopathy. Does not bruise/bleed easily.   Psychiatric/Behavioral:  Negative for self-injury and suicidal ideas.         Physical Exam  Vitals and nursing note reviewed.   Constitutional:       Appearance: Normal appearance.   HENT:      Head: Normocephalic and atraumatic.      Mouth/Throat:      Mouth: Mucous membranes are moist.      Pharynx: Oropharynx is clear.   Eyes:      Extraocular Movements: Extraocular movements intact.      Pupils: Pupils are equal, round, and reactive to light.   Cardiovascular:      Rate and Rhythm: Normal rate and regular rhythm.      Pulses: Normal pulses.   Pulmonary:      Effort: Pulmonary effort is normal.      Breath sounds: Normal breath sounds.   Abdominal:      General: There is no distension.      Palpations: Abdomen is soft.      Tenderness: There is abdominal tenderness (Right  "sided).   Musculoskeletal:      Cervical back: Normal range of motion and neck supple.   Skin:     General: Skin is warm and dry.   Neurological:      General: No focal deficit present.      Mental Status: She is alert and oriented to person, place, and time.   Psychiatric:         Mood and Affect: Mood normal.         Behavior: Behavior normal.          Last Recorded Vitals  Blood pressure 118/72, pulse 75, temperature 36.7 °C (98 °F), temperature source Oral, height 1.6 m (5' 3\"), weight 80.7 kg (178 lb), SpO2 100%.    Relevant Results  Reviewed multiple different quadrant ultrasound and CT scans of the abdomen and pelvis dating back to 2008  Discussed patient with Dr. Ennis     Assessment/Plan   Problem List Items Addressed This Visit    None  Visit Diagnoses         Codes    Retroperitoneal mass    -  Primary R19.00    Relevant Orders    IR biopsy abdomen    Gall bladder polyp     K82.4          In regards to the gallbladder polyp, this has been seen intermittently on right upper quadrant ultrasounds for numerous years.  The first time it was noted in ultrasound report was in 2012 at 3 mm, and that is what it is currently showing.  I think for now the gallbladder polyp is benign, it has not grown, is not symptomatic and can be observed at this time.    The retroperitoneal mass is a new finding from previous CT scan of the abdomen.  A CT scan in 2011 was performed which did not report any abnormality, however there does appear to be a small 5 mm lesion which could be related to this mass which is 2 cm now.  While this area is retroperitoneal, it is not large enough or close enough to be causing any mass effect to the ureter, so unlikely to be causing the pyelonephritis and hydronephrosis.  I have recommended that we obtain a percutaneous biopsy of this mass for further workup.  Encouraged her to keep follow-up with the urologist especially if her symptoms do not improve.  I will call her once the biopsy results " have returned to discuss next steps.      Leyda Nelson MD

## 2025-04-03 NOTE — PATIENT INSTRUCTIONS
Radiology / Laboratory  Testing Instructions     The doctor has referred you to [facility] Radiology or Laboratory for additional testing. In order for us to better assist you in the process, please follow these instructions.     Please call today to schedule your Diagnostic Imaging at [phone].   If your order is for lab work, please take your requisition to the nearest [facility] Laboratory to obtain testing.      Dr will call with results and next step      Thank you for allowing us to care for you!

## 2025-04-06 ENCOUNTER — PATIENT MESSAGE (OUTPATIENT)
Dept: SURGERY | Facility: CLINIC | Age: 55
End: 2025-04-06
Payer: COMMERCIAL

## 2025-04-09 ENCOUNTER — APPOINTMENT (OUTPATIENT)
Dept: RADIOLOGY | Facility: HOSPITAL | Age: 55
End: 2025-04-09
Payer: COMMERCIAL

## 2025-04-10 ENCOUNTER — HOSPITAL ENCOUNTER (OUTPATIENT)
Dept: RADIOLOGY | Facility: HOSPITAL | Age: 55
Discharge: HOME | End: 2025-04-10
Payer: COMMERCIAL

## 2025-04-10 VITALS
HEIGHT: 63 IN | WEIGHT: 177 LBS | RESPIRATION RATE: 12 BRPM | OXYGEN SATURATION: 95 % | SYSTOLIC BLOOD PRESSURE: 128 MMHG | HEART RATE: 79 BPM | BODY MASS INDEX: 31.36 KG/M2 | DIASTOLIC BLOOD PRESSURE: 76 MMHG

## 2025-04-10 DIAGNOSIS — R19.00 RETROPERITONEAL MASS: ICD-10-CM

## 2025-04-10 LAB
POCT INTERNATIONAL NORMALIZATION RATIO: 1
POCT PROTHROMBIN TIME: 12 SECONDS

## 2025-04-10 PROCEDURE — C1819 TISSUE LOCALIZATION-EXCISION: HCPCS

## 2025-04-10 PROCEDURE — 7100000009 HC PHASE TWO TIME - INITIAL BASE CHARGE

## 2025-04-10 PROCEDURE — 99152 MOD SED SAME PHYS/QHP 5/>YRS: CPT

## 2025-04-10 PROCEDURE — 2500000004 HC RX 250 GENERAL PHARMACY W/ HCPCS (ALT 636 FOR OP/ED): Performed by: RADIOLOGY

## 2025-04-10 PROCEDURE — 2720000007 HC OR 272 NO HCPCS

## 2025-04-10 PROCEDURE — 99152 MOD SED SAME PHYS/QHP 5/>YRS: CPT | Performed by: RADIOLOGY

## 2025-04-10 PROCEDURE — 7100000010 HC PHASE TWO TIME - EACH INCREMENTAL 1 MINUTE

## 2025-04-10 PROCEDURE — 77012 CT SCAN FOR NEEDLE BIOPSY: CPT

## 2025-04-10 RX ORDER — LIDOCAINE HYDROCHLORIDE 10 MG/ML
INJECTION, SOLUTION EPIDURAL; INFILTRATION; INTRACAUDAL; PERINEURAL
Status: COMPLETED | OUTPATIENT
Start: 2025-04-10 | End: 2025-04-10

## 2025-04-10 RX ORDER — FENTANYL CITRATE 50 UG/ML
INJECTION, SOLUTION INTRAMUSCULAR; INTRAVENOUS
Status: COMPLETED | OUTPATIENT
Start: 2025-04-10 | End: 2025-04-10

## 2025-04-10 RX ORDER — MIDAZOLAM HYDROCHLORIDE 1 MG/ML
INJECTION, SOLUTION INTRAMUSCULAR; INTRAVENOUS
Status: COMPLETED | OUTPATIENT
Start: 2025-04-10 | End: 2025-04-10

## 2025-04-10 RX ADMIN — LIDOCAINE HYDROCHLORIDE 7 ML: 10 INJECTION, SOLUTION EPIDURAL; INFILTRATION; INTRACAUDAL; PERINEURAL at 12:57

## 2025-04-10 RX ADMIN — FENTANYL CITRATE 25 MCG: 0.05 INJECTION, SOLUTION INTRAMUSCULAR; INTRAVENOUS at 13:11

## 2025-04-10 RX ADMIN — FENTANYL CITRATE 50 MCG: 0.05 INJECTION, SOLUTION INTRAMUSCULAR; INTRAVENOUS at 12:53

## 2025-04-10 RX ADMIN — MIDAZOLAM 1 MG: 1 INJECTION INTRAMUSCULAR; INTRAVENOUS at 13:11

## 2025-04-10 RX ADMIN — MIDAZOLAM 2 MG: 1 INJECTION INTRAMUSCULAR; INTRAVENOUS at 12:53

## 2025-04-10 RX ADMIN — LIDOCAINE HYDROCHLORIDE 3 ML: 10 INJECTION, SOLUTION EPIDURAL; INFILTRATION; INTRACAUDAL; PERINEURAL at 13:07

## 2025-04-10 ASSESSMENT — PAIN SCALES - GENERAL
PAINLEVEL_OUTOF10: 0 - NO PAIN
PAINLEVEL_OUTOF10: 3
PAINLEVEL_OUTOF10: 0 - NO PAIN

## 2025-04-10 ASSESSMENT — PAIN - FUNCTIONAL ASSESSMENT: PAIN_FUNCTIONAL_ASSESSMENT: 0-10

## 2025-04-10 NOTE — NURSING NOTE
END OF PROCEDURE MONITORING CRITERIA  01. BP is within +/- 20% of preprocedure  02. Oxygen sat is at 92% or above on RA, or existing order for O2 treatment, or at pre-sedation levels, otherwise new O2 order needed.  03. Unless the patient has a pre-procedure history of diminished level of consciousness, s/he is easily arousable and when aroused is able to responds appropriately for his/her age.  04. Significant complications related to the specific procedure are absent, have been controlled, or have been evaluated, including:      A. Pain.      B. Wound drainage.      C. All drains and tubes are patent.      D. Nausea and Vomiting. Vomiting is not persisten and has not occurred within 15 minutes prior to discharge.      E. Bladder distention. Voided bladder and/or no symptoms or urinary retention (e.g., bladder distention, frequent voiding in small amounts).      F. Neurovascular status.      G. Level of Consciousness consistent with pre procedural status.        spouse  affirmed that they were driving the patient home.

## 2025-04-10 NOTE — DISCHARGE INSTRUCTIONS
Patient and Family Education  What is a Biopsy or Aspiration?  A biopsy or aspiration is used to help doctors diagnose disease. Small pieces of tissue or cells  are taken from the area using a special kind of needle. The tissue is sent to the lab to be looked  at under a microscope. The procedure can take up to 1 hour.  Before the Test:  ? If you take blood thinning medications, you Must ask your doctor when you should stop  taking them. You may need to stop taking the medicine up to seven (7) days prior to the  test.  ? Do Not Drink or Eat Anything after midnight the day before the test. You may take  medications with a small amount of clear liquid.  ? If you take daily oral diabetic medications, contact your Radiologist or your Radiology  Nurse to determine if you should take your medicine before the test or adjust the dosage.  ? Make plans for a ride home if you are an outpatient.  ? If you have an allergy to iodine or iodinated contrast, your doctor may prescribe special  pills to take before the test.  During the Test:  ? You will lie on a padded X-Ray table.  ? You may be given medicine that will make you drowsy.  ? You will also be given a local anesthetic to numb the biopsy site.  ? You will feel pressure during the biopsy.  After the Test:  ? You will remain on bed rest for 1 to 4 hours.  ? Your blood pressure, pulse and biopsy site will be checked often.  ? If a large sample of tissue needs to be taken, you may be admitted to the hospital for  observation.  Following these instructions for a safer recovery:     Page 2 of 2  Activity:  ? Limit your activity for 24 hours after the test.  ? Do not drive for 24 hours.  ? Do not do any heavy lifting, such as groceries, for 24 hours.  ? Avoid intense exercise and contact sports for 24 hours.  Diet:  ? You may resume your normal diet.  Medicines:  ? If you take medications to thin your blood, ask your doctor when you should start taking  them after the test.  ?  You may take your other medicines as ordered by your doctor.  Call your Doctor if you have:  ? Redness, swelling or pus-like drainage at the biopsy site.  ? Temperature of 100.4 F degrees or higher.  ? Increased pain or tenderness at the biopsy site.  ? Any questions.  Call your Doctor Right Away if you have any of the Signs:  ? Increase in pain in the biopsy site.  ? Dizziness or fainting.  ? Shortness of breath or trouble breathing.  ? Bleeding from the biopsy site.  If you are not able to contact your doctor, call 911 and/or go to the nearest hospital.     How to Reach your Doctor:  Call Dr. Nelson at [phone] with problems or questions.

## 2025-04-16 DIAGNOSIS — I10 BENIGN HYPERTENSION: Primary | ICD-10-CM

## 2025-04-16 RX ORDER — CHLORTHALIDONE 25 MG/1
TABLET ORAL
Qty: 30 TABLET | Refills: 11 | Status: SHIPPED | OUTPATIENT
Start: 2025-04-16

## 2025-04-23 ENCOUNTER — TELEPHONE (OUTPATIENT)
Dept: SURGERY | Facility: CLINIC | Age: 55
End: 2025-04-23
Payer: COMMERCIAL

## 2025-04-23 RX ORDER — CEPHALEXIN 500 MG/1
TABLET, FILM COATED ORAL
COMMUNITY
Start: 2025-04-22

## 2025-04-23 NOTE — TELEPHONE ENCOUNTER
Spoke with pt verified by name/.  I informed the pt , that we don't have her results as of yet we will contact her once the results are available. Pt verbalized understanding and denies further questions.     Thank You  Viridiana

## 2025-04-30 LAB
LAB AP ASR DISCLAIMER: NORMAL
LABORATORY COMMENT REPORT: NORMAL
PATH REPORT.COMMENTS IMP SPEC: NORMAL
PATH REPORT.FINAL DX SPEC: NORMAL
PATH REPORT.GROSS SPEC: NORMAL
PATH REPORT.RELEVANT HX SPEC: NORMAL
PATH REPORT.TOTAL CANCER: NORMAL
SCAN RESULT: NORMAL

## 2025-05-01 DIAGNOSIS — C49.9 SARCOMA (MULTI): Primary | ICD-10-CM

## 2025-05-10 ENCOUNTER — APPOINTMENT (OUTPATIENT)
Dept: RADIOLOGY | Facility: HOSPITAL | Age: 55
End: 2025-05-10
Payer: COMMERCIAL

## 2025-05-12 NOTE — PROGRESS NOTES
History and Physical    Referring Provider:  Leyda Nelson MD Paul C Thomas, DO    Chief Complaint:  Chief Complaint   Patient presents with    New Patient Visit   Right Retroperitoneal Sarcoma    History of Present Illness:  This is a 54 y.o. female who presents with a right retroperitoneal liposarcoma.  She presented for medical attention due to sciatic pain and right back pain and was found to have hydronephrosis and a urinary tract infection.  The kidney was decompressed with a right ureteral stent and treated with antibiotics.  She continues to have some flank tenderness but no longer has positive urine culture and states that most of her symptoms have improved.  Imaging did identify changes in the retroperitoneal fat and a nodule within this.  This was biopsied with needle biopsy and confirmed to be well-differentiated liposarcoma.  The patient had a CT scan of the abdomen pelvis that showed 1 subcentimeter liver lesion and some pleural-based lung lesions of uncertain significance.  She presents today for consideration of management of this tumor.    Past Medical History:  Past Medical History:  No date: Asthma  2022: Body mass index (BMI) 33.0-33.9, adult      Comment:  BMI 33.0-33.9,adult  No date: Cyst of right kidney      Comment:  resolved per last U/S  2007: Disease of thyroid gland  No date: Dyslipidemia  No date: Gallstones  No date: GERD (gastroesophageal reflux disease)  No date: History of dysphagia      Comment:  since thyroidectomy  No date: Hypertension  2007: Hypothyroidism  No date: IGGY (obstructive sleep apnea)  2007: PONV (postoperative nausea and vomiting)  No date: Psoriasis  No date: Psoriatic arthritis (Multi)  No date: Snoring  No date: Spondyloarthritis  No date: Thyroid cancer (Multi)     Past Surgical History:  Past Surgical History:  2016:  SECTION, CLASSIC      Comment:   Section  , , , :  SECTION, LOW TRANSVERSE  2007:  ENDOMETRIAL ABLATION  No date: FOOT SURGERY      Comment:  cyst removed from L  foot between 1st and 2nd MTP  No date: HAND SURGERY; Right      Comment:  R  cyst removal  No date: OTHER SURGICAL HISTORY      Comment:  hypoglossal nerve stimulator- [employer]  2007: TOTAL THYROIDECTOMY      Comment:  first partial, then total     Medications:  Current Outpatient Medications   Medication Instructions    acetaminophen (TYLENOL) 1,000 mg, Every 6 hours PRN    albuterol-budesonide (Airsupra) 90-80 mcg/actuation inhaler 2 puffs, 4 times daily PRN    amLODIPine (NORVASC) 5 mg, Daily    Breo Ellipta 200-25 mcg/dose inhaler INHALE 1 PUFF ONCE A DAY 90    buPROPion XL (WELLBUTRIN XL) 150 mg, Daily    cephalexin (Keftab) 500 mg tablet     chlorthalidone (Hygroton) 25 mg tablet TAKE 1 TAB BY MOUTH EVERY MORNING WITH FOOD    citalopram (CELEXA) 40 mg, Daily    ergocalciferol (VITAMIN D-2) 50,000 Units, oral, Once Weekly    etanercept (ENBREL) 50 mg, subcutaneous, Once Weekly    ezetimibe (ZETIA) 10 mg, oral, Daily    folic acid (Folvite) 1 mg tablet TAKE 1 TABLET ORALLY ONCE A DAY EXCEPT THE DAY OF METHOTREXATE 90 DAYS    oxyCODONE (ROXICODONE) 5 mg    pantoprazole (PROTONIX) 40 mg, 2 times daily    potassium chloride CR 10 mEq ER tablet     rosuvastatin (CRESTOR) 40 mg, oral, Daily    scopolamine (Transderm-Scop) 1 mg over 3 days patch 3 day 1 patch    Synthroid 100 mcg, Daily before breakfast        Allergies:  RX Allergies[1]     Family History:  family history includes Alcohol abuse in her father; Breast cancer (age of onset: 59) in her mother; Cancer in her mother; Glaucoma in her maternal grandmother; Heart disease in her maternal grandmother; Hypertension in her maternal grandmother; No Known Problems in her daughter, maternal grandfather, mother's sister, paternal grandfather, paternal grandmother, and son; Stroke in her maternal grandmother.     Social History:   reports that she has never smoked. She has never used  "smokeless tobacco. She reports that she does not currently use alcohol. She reports that she does not use drugs.     Review of Systems:  A complete 12 point review of systems was performed and is negative except as noted in the history of present illness.    Vital Signs:  Vitals:    05/13/25 1455   BP: 126/74   Pulse: 80   Resp: 16   Temp: 36.4 °C (97.5 °F)        Physical Exam:  GEN: No acute distress, Healthy appearing  HEENT: Moist mucus membranes, normocephalic  CARDS: RRR  PULM: No respiratory distress  GI: Soft, non-distended. Right sided tenderness to palpation. Right CVAT. Umbilical hernia  SKIN: No rashes or lesions  NEURO: No gross sensorimotor deficits  EXT: No arm or leg swelling    Laboratory Values:  Lab Results   Component Value Date    WBC 9.3 03/20/2025    HGB 13.9 03/20/2025    HCT 41.8 03/20/2025    MCV 82 03/20/2025     03/20/2025        Chemistry    Lab Results   Component Value Date/Time     03/21/2025 0906     02/05/2025 0812    K 3.3 (L) 03/21/2025 0906    K 3.7 02/05/2025 0812     03/21/2025 0906     02/05/2025 0812    CO2 29 03/21/2025 0906    CO2 32 02/05/2025 0812    BUN 9 03/21/2025 0906    BUN 16 02/05/2025 0812    CREATININE 0.84 03/21/2025 0906    CREATININE 0.82 02/05/2025 0812    Lab Results   Component Value Date/Time    CALCIUM 8.7 03/21/2025 0906    CALCIUM 9.6 02/05/2025 0812    ALKPHOS 50 03/20/2025 0014    ALKPHOS 53 02/05/2025 0812    AST 16 03/20/2025 0014    AST 18 02/05/2025 0812    ALT 14 03/20/2025 0014    ALT 17 02/05/2025 0812    BILITOT 1.6 (H) 03/20/2025 0014    BILITOT 1.2 02/05/2025 0812           No results found for: \"PR1\"      Imaging:  I have personally reviewed the images and the radiologist's report.    CT Abdomen/Pelvis 3/20/2025  IMPRESSION:  Persistent prominent asymmetric right perinephric edema. Interval  decrease in previously seen hydronephrosis with mild residual  pelviectasis. No evidence of obstructive calculus. The " described  findings may relate to upper urinary tract infection, however other  etiologies are not excluded as described on the prior examination and  follow-up urology evaluation is recommended and continue short-term  progress imaging recommended for further evaluation.      Stable appearance of the previously described 2 cm x 1.4 cm right  retroperitoneal soft tissue nodular lesion with malignancy not  excluded as described on the prior examination.      Several pulmonary nodules and subpleural nodular opacities in the  imaged lower lungs with largest subpleural nodular opacity measuring  9 mm. Outpatient CT chest recommended for further evaluation and to  assess for additional pulmonary nodules.    Assessment:  This is a 54 y.o. female who presents with a right retroperitoneal liposarcoma in the setting of ureteral obstruction and inflammatory changes as seen on the CT scan.  The kidney is decompressed at this time with a right ureteral stent.  The patient is feeling better although not fully resolved of her CVA tenderness.  I reviewed the scan with her and her  discussing the necessity for right nephrectomy at the time of retroperitoneal liposarcoma resection due to the extent of tumor surrounding the kidney and ureter.  Since the patient continues to have tenderness, I do recommend for repeat imaging as well as a CT scan of the chest prior to surgery.    Plan:  -- Surgery 5/28/2025 - Right RP Sarcoma resection with en bloc right nephrectomy. Possible colectomy.   -- PAT  -- Preoperative Imaging with CT CAP   -- Tumor Board presentation  -- The patient asked very appropriate questions that were answered to the best of my ability with the current information at hand. She knows to call with any questions or concerns that arise        Enzo Villa MD, MPH           [1]   Allergies  Allergen Reactions    Lipitor [Atorvastatin] Other     Muscle aches    Methotrexate GI Upset    Iodine Other     vomiting

## 2025-05-13 ENCOUNTER — OFFICE VISIT (OUTPATIENT)
Dept: SURGICAL ONCOLOGY | Facility: CLINIC | Age: 55
End: 2025-05-13
Payer: COMMERCIAL

## 2025-05-13 VITALS
TEMPERATURE: 97.5 F | DIASTOLIC BLOOD PRESSURE: 74 MMHG | WEIGHT: 175.4 LBS | SYSTOLIC BLOOD PRESSURE: 126 MMHG | BODY MASS INDEX: 31.07 KG/M2 | HEART RATE: 80 BPM | RESPIRATION RATE: 16 BRPM

## 2025-05-13 DIAGNOSIS — C49.9 SARCOMA (MULTI): Primary | ICD-10-CM

## 2025-05-13 DIAGNOSIS — Z88.8 ALLERGY TO IODINE: ICD-10-CM

## 2025-05-13 PROCEDURE — 99205 OFFICE O/P NEW HI 60 MIN: CPT | Performed by: SURGERY

## 2025-05-13 PROCEDURE — 3074F SYST BP LT 130 MM HG: CPT | Performed by: SURGERY

## 2025-05-13 PROCEDURE — 99215 OFFICE O/P EST HI 40 MIN: CPT | Mod: 57 | Performed by: SURGERY

## 2025-05-13 PROCEDURE — 3078F DIAST BP <80 MM HG: CPT | Performed by: SURGERY

## 2025-05-13 RX ORDER — HEPARIN SODIUM 5000 [USP'U]/ML
5000 INJECTION, SOLUTION INTRAVENOUS; SUBCUTANEOUS ONCE
OUTPATIENT
Start: 2025-05-13 | End: 2025-05-13

## 2025-05-13 RX ORDER — PREDNISONE 50 MG/1
TABLET ORAL
Qty: 3 TABLET | Refills: 0 | Status: SHIPPED | OUTPATIENT
Start: 2025-05-13

## 2025-05-13 RX ORDER — CEFAZOLIN SODIUM 2 G/100ML
2 INJECTION, SOLUTION INTRAVENOUS ONCE
OUTPATIENT
Start: 2025-05-13 | End: 2025-05-13

## 2025-05-13 ASSESSMENT — ENCOUNTER SYMPTOMS
OCCASIONAL FEELINGS OF UNSTEADINESS: 0
DEPRESSION: 0
LOSS OF SENSATION IN FEET: 0

## 2025-05-13 ASSESSMENT — PAIN SCALES - GENERAL: PAINLEVEL_OUTOF10: 6

## 2025-05-14 ENCOUNTER — LAB (OUTPATIENT)
Dept: LAB | Facility: HOSPITAL | Age: 55
End: 2025-05-14
Payer: COMMERCIAL

## 2025-05-14 DIAGNOSIS — C49.9 MALIGNANT NEOPLASM OF CONNECTIVE AND SOFT TISSUE, UNSPECIFIED: Primary | ICD-10-CM

## 2025-05-14 LAB
ANION GAP SERPL CALCULATED.3IONS-SCNC: 13 MMOL/L (ref 10–20)
BUN SERPL-MCNC: 14 MG/DL (ref 6–23)
CALCIUM SERPL-MCNC: 9.4 MG/DL (ref 8.6–10.3)
CHLORIDE SERPL-SCNC: 99 MMOL/L (ref 98–107)
CO2 SERPL-SCNC: 32 MMOL/L (ref 21–32)
CREAT SERPL-MCNC: 0.96 MG/DL (ref 0.5–1.05)
EGFRCR SERPLBLD CKD-EPI 2021: 70 ML/MIN/1.73M*2
GLUCOSE SERPL-MCNC: 102 MG/DL (ref 74–99)
POTASSIUM SERPL-SCNC: 3.6 MMOL/L (ref 3.5–5.3)
SODIUM SERPL-SCNC: 140 MMOL/L (ref 136–145)

## 2025-05-14 PROCEDURE — 80048 BASIC METABOLIC PNL TOTAL CA: CPT

## 2025-05-14 PROCEDURE — 36415 COLL VENOUS BLD VENIPUNCTURE: CPT

## 2025-05-15 ENCOUNTER — HOSPITAL ENCOUNTER (OUTPATIENT)
Dept: RADIOLOGY | Facility: HOSPITAL | Age: 55
Discharge: HOME | End: 2025-05-15
Payer: COMMERCIAL

## 2025-05-15 DIAGNOSIS — C49.9 SARCOMA (MULTI): ICD-10-CM

## 2025-05-15 DIAGNOSIS — C49.9 SARCOMA (MULTI): Primary | ICD-10-CM

## 2025-05-15 PROCEDURE — 74177 CT ABD & PELVIS W/CONTRAST: CPT

## 2025-05-15 PROCEDURE — 2550000001 HC RX 255 CONTRASTS: Performed by: SURGERY

## 2025-05-15 RX ADMIN — IOHEXOL 75 ML: 350 INJECTION, SOLUTION INTRAVENOUS at 14:16

## 2025-05-16 ENCOUNTER — TUMOR BOARD CONFERENCE (OUTPATIENT)
Dept: HEMATOLOGY/ONCOLOGY | Facility: HOSPITAL | Age: 55
End: 2025-05-16
Payer: COMMERCIAL

## 2025-05-16 DIAGNOSIS — C49.9 SARCOMA (MULTI): Primary | ICD-10-CM

## 2025-05-16 NOTE — TUMOR BOARD NOTE
Tumor Board Documentation    Mrs Felzi was presented by Dr. Enzo Villa at our Sarcoma Tumor Board on 5/16/2025, which included representatives from  Surgical Oncology, Ortho Oncology, Medical Oncology, Radiation Oncology, Radiology ([facility]), and Sarcoma Pathology.    Mrs Feliz currently presents with a right retroperitoneal lipomatous mass causing ureteral obstruction. The patient had a right ureteral stent placed. IR biopsy of a nodular portion of the retroperitoneal mass was consistent with a well differentiated liposarcoma with sclerosis. Chest CT identified a 6mm right lung lesion that is stable on imaging back to 2011.    Additionally, we reviewed previous medical and familial history, history of present illness, and recent lab results along with all available histopathologic and imaging studies. The tumor board considered available treatment options and made the following recommendations:     [ ] Surgery - Radical resection right retroperitoneal liposarcoma with nephroureterectomy      Clinical Trial Status:   N/A    National site-specific guidelines were discussed with respect to the case.

## 2025-05-20 ENCOUNTER — PRE-ADMISSION TESTING (OUTPATIENT)
Dept: PREADMISSION TESTING | Facility: HOSPITAL | Age: 55
End: 2025-05-20
Payer: COMMERCIAL

## 2025-05-20 VITALS
TEMPERATURE: 97.3 F | DIASTOLIC BLOOD PRESSURE: 89 MMHG | OXYGEN SATURATION: 98 % | HEART RATE: 77 BPM | SYSTOLIC BLOOD PRESSURE: 128 MMHG | WEIGHT: 178 LBS | BODY MASS INDEX: 31.54 KG/M2 | RESPIRATION RATE: 16 BRPM | HEIGHT: 63 IN

## 2025-05-20 DIAGNOSIS — C49.9 SARCOMA (MULTI): ICD-10-CM

## 2025-05-20 DIAGNOSIS — I10 HYPERTENSION, UNSPECIFIED TYPE: Primary | ICD-10-CM

## 2025-05-20 LAB
ABO GROUP (TYPE) IN BLOOD: NORMAL
ANTIBODY SCREEN: NORMAL
ERYTHROCYTE [DISTWIDTH] IN BLOOD BY AUTOMATED COUNT: 13.1 % (ref 11.5–14.5)
HCT VFR BLD AUTO: 38.5 % (ref 36–46)
HGB BLD-MCNC: 12.4 G/DL (ref 12–16)
MCH RBC QN AUTO: 26.1 PG (ref 26–34)
MCHC RBC AUTO-ENTMCNC: 32.2 G/DL (ref 32–36)
MCV RBC AUTO: 81 FL (ref 80–100)
NRBC BLD-RTO: 0 /100 WBCS (ref 0–0)
PLATELET # BLD AUTO: 376 X10*3/UL (ref 150–450)
RBC # BLD AUTO: 4.75 X10*6/UL (ref 4–5.2)
RH FACTOR (ANTIGEN D): NORMAL
WBC # BLD AUTO: 9.6 X10*3/UL (ref 4.4–11.3)

## 2025-05-20 PROCEDURE — 99205 OFFICE O/P NEW HI 60 MIN: CPT | Performed by: NURSE PRACTITIONER

## 2025-05-20 PROCEDURE — 36415 COLL VENOUS BLD VENIPUNCTURE: CPT

## 2025-05-20 PROCEDURE — 85027 COMPLETE CBC AUTOMATED: CPT

## 2025-05-20 PROCEDURE — 93010 ELECTROCARDIOGRAM REPORT: CPT | Performed by: INTERNAL MEDICINE

## 2025-05-20 PROCEDURE — 86901 BLOOD TYPING SEROLOGIC RH(D): CPT

## 2025-05-20 PROCEDURE — 86923 COMPATIBILITY TEST ELECTRIC: CPT

## 2025-05-20 PROCEDURE — 93005 ELECTROCARDIOGRAM TRACING: CPT

## 2025-05-20 RX ORDER — CHLORHEXIDINE GLUCONATE 40 MG/ML
SOLUTION TOPICAL DAILY PRN
Qty: 473 ML | Refills: 0 | Status: SHIPPED | OUTPATIENT
Start: 2025-05-20 | End: 2025-05-25

## 2025-05-20 ASSESSMENT — DUKE ACTIVITY SCORE INDEX (DASI)
TOTAL_SCORE: 7.2
CAN YOU TAKE CARE OF YOURSELF (EAT, DRESS, BATHE, OR USE TOILET): YES
CAN YOU DO YARD WORK LIKE RAKING LEAVES, WEEDING OR PUSHING A MOWER: NO
CAN YOU HAVE SEXUAL RELATIONS: NO
CAN YOU RUN A SHORT DISTANCE: NO
CAN YOU PARTICIPATE IN MODERATE RECREATIONAL ACTIVITIES LIKE GOLF, BOWLING, DANCING, DOUBLES TENNIS OR THROWING A BASEBALL OR FOOTBALL: NO
CAN YOU PARTICIPATE IN STRENOUS SPORTS LIKE SWIMMING, SINGLES TENNIS, FOOTBALL, BASKETBALL, OR SKIING: NO
CAN YOU WALK A BLOCK OR TWO ON LEVEL GROUND: NO
CAN YOU DO HEAVY WORK AROUND THE HOUSE LIKE SCRUBBING FLOORS OR LIFTING AND MOVING HEAVY FURNITURE: NO
CAN YOU DO MODERATE WORK AROUND THE HOUSE LIKE VACUUMING, SWEEPING FLOORS OR CARRYING GROCERIES: NO
DASI METS SCORE: 3.6
CAN YOU CLIMB A FLIGHT OF STAIRS OR WALK UP A HILL: NO
CAN YOU WALK INDOORS, SUCH AS AROUND YOUR HOUSE: YES
CAN YOU DO LIGHT WORK AROUND THE HOUSE LIKE DUSTING OR WASHING DISHES: YES

## 2025-05-20 ASSESSMENT — LIFESTYLE VARIABLES: SMOKING_STATUS: NONSMOKER

## 2025-05-20 ASSESSMENT — ENCOUNTER SYMPTOMS
ENDOCRINE NEGATIVE: 1
NECK NEGATIVE: 1
RESPIRATORY NEGATIVE: 1
MYALGIAS: 1
ARTHRALGIAS: 1
CONSTITUTIONAL NEGATIVE: 1
CARDIOVASCULAR NEGATIVE: 1
NAUSEA: 1
NEUROLOGICAL NEGATIVE: 1

## 2025-05-20 NOTE — PREPROCEDURE INSTRUCTIONS
Fasting Guidelines    NPO Instructions:    Do not eat any food after midnight the night before your surgery/procedure.  You may have up to Thirteen ounces of clear liquids until TWO hours before your instructed arrival time to the hospital. This includes water, black tea/coffee, (no milk or cream), apple juice, and/or electrolyte drinks (Gatorade).  You may chew gum up to TWO hours before your surgery/procedure.    Additional Instructions:     We have sent a prescription for Hibiclens soap and Peridex mouth wash to your preferred pharmacy.  If you have not already, Please  your prescription and start using as directed before surgery.  Follow the instruction sheet provided to you at your CPM/PAT appointment.    Avoid herbal supplements, multivitamins and NSAIDS (non-steroidal anti-inflammatory drugs) such as Advil, Aleve, Ibuprofen, Naproxen, Excedrin, Meloxicam or Celebrex for at least 7 days prior to surgery. May take Tylenol as needed.    Avoid tobacco and alcohol products for 24 hours prior to surgery.    CONTACT SURGEON'S OFFICE IF YOU DEVELOP:  * Fever = 100.4 F   * New respiratory symptoms (e.g. cough, shortness of breath, respiratory distress, sore throat)  * Recent loss of taste or smell  *Flu like symptoms such as headache, fatigue or gastrointestinal symptoms  * You develop any open sores, shingles, burning or painful urination   AND/OR:  * You no longer wish to have the surgery.  * Any other personal circumstances change that may lead to the need to cancel or defer this surgery.  *You were admitted to any hospital within one week of your planned procedure.    Seven/Six Days before Surgery:  Review your medication instructions, stop indicated medications    Day of Surgery:  Review your medication instructions, take indicated medications  Wear comfortable loose fitting clothing  Do not use moisturizers, creams, lotions or perfume  All jewelry and valuables should be left at home      [facility]  [phone]           Patient Information: Pre-Operative Infection Prevention Measures     Why did I have my nose, under my arms, and groin swabbed?  The purpose of the swab is to identify Staphylococcus aureus inside your nose or on your skin.  The swab was sent to the laboratory for culture.  A positive swab/culture for Staphylococcus aureus is called colonization or carriage.      What is Staphylococcus aureus?  Staphylococcus aureus, also known as “staph”, is a germ found on the skin or in the nose of healthy people.  Sometimes Staphylococcus aureus can get into the body and cause an infection.  This can be minor (such as pimples, boils, or other skin problems).  It might also be serious (such as a blood infection, pneumonia, or a surgical site infection).    What is Staphylococcus aureus colonization or carriage?  Colonization or carriage means that a person has the germ but is not sick from it.  These bacteria can be spread on the hands or when breathing or sneezing.    How is Staphylococcus aureus spread?  It is most often spread by close contact with a person or item that carries it.    What happens if my culture is positive for Staphylococcus aureus?  Your doctor/medical team will use this information to guide any antibiotic treatment which may be necessary.  Regardless of the culture results, we will clean the inside of your nose with a betadine swab just before you have your surgery.      Will I get an infection if I have Staphylococcus aureus in my nose or on my skin?  Anyone can get an infection with Staphylococcus aureus.  However, the best way to reduce your risk of infection is to follow the instructions provided to you for the use of your CHG soap and dental rinse.        Patient Information: Oral/Dental Rinse    What is oral/dental rinse?   It is a mouthwash. It is a way of cleaning the mouth with a germ-killing solution before your surgery.  The solution contains  chlorhexidine, commonly known as CHG.   It is used inside the mouth to kill a bacteria known as Staphylococcus aureus.  Let your doctor know if you are allergic to Chlorhexidine.    Why do I need to use CHG oral/dental rinse?  The CHG oral/dental rinse helps to kill a bacteria in your mouth known as Staphylococcus aureus.     This reduces the risk of infection at the surgical site.      Using your CHG oral/dental rinse  STEPS:  Use your CHG oral/dental rinse after you brush your teeth the night before (at bedtime) and the morning of your surgery.  Follow all directions on your prescription label.    Use the cap on the container to measure 15ml   Swish (gargle if you can) the mouthwash in your mouth for at least 30 seconds, (do not swallow) and spit out  After you use your CHG rinse, do not rinse your mouth with water, drink or eat.  Please refer to the prescription label for the appropriate time to resume oral intake      What side effects might I have using the CHG oral/dental rinse?  CHG rinse will stick to plaque on the teeth.  Brush and floss just before use.  Teeth brushing will help avoid staining of plaque during use.      Patient Information: Home Preoperative Antibacterial Shower      What is a home preoperative antibacterial shower?  This shower is a way of cleaning the skin with a germ-killing solution before surgery.  The solution contains chlorhexidine, commonly known as CHG.  CHG is a skin cleanser with germ-killing ability.  Let your doctor know if you are allergic to chlorhexidine.    Why do I need to take a preoperative antibacterial shower?  Skin is not sterile.  It is best to try to make your skin as free of germs as possible before surgery.  Proper cleansing with a germ-killing soap before surgery can lower the number of germs on your skin.  This helps to reduce the risk of infection at the surgical site.  Following the instructions listed below will help you prepare your skin for surgery.       How do I use the solution?  Steps:  Begin using your CHG soap 5 days before your scheduled surgery on ________________________.    First, wash and rinse your hair using the CHG soap. Keep CHG soap away from ear canals and eyes.  Rinse completely, do not condition.  Hair extensions should be removed.  Wash your face with your normal soap and rinse.    Apply the CHG solution to a clean wet washcloth.  Turn the water off or move away from the water spray to avoid premature rinsing of the CHG soap as you are applying.   Firmly lather your entire body from the neck down.  Do not use on your face.  Pay special attention to the area(s) where your incision(s) will be located unless they are on your face.  Avoid scrubbing your skin too hard.  The important point is to have the CHG soap sit on your skin for 3 minutes.    When the 3 minutes are up, turn on the water and rinse the CHG solution off your body completely.   DO NOT wash with regular soap after you have used the CHG soap solution  Pat yourself dry with a clean, freshly-laundered towel.  DO NOT apply powders, deodorants, or lotions.  Dress in clean, freshly laundered nightclothes.    Be sure to sleep with clean, freshly laundered sheets.  Be aware that CHG will cause stains on fabrics; if you wash them with bleach after use.  Rinse your washcloth and other linens that have contact with CHG completely.  Use only non-chlorine detergents to launder the items used.   The morning of surgery is the fifth day.  Repeat the above steps and dress in clean comfortable clothing     Whom should I contact if I have any questions regarding the use of CHG soap?  Call the [facility], [facility] at [phone] if you have any questions.               Preoperative Brain Exercises    What are brain exercises?  A brain exercise is any activity that engages your thinking (cognitive) skills.    What types of activities are  considered brain exercises?  Jigsaw puzzles, crossword puzzles, word jumble, memory games, word search, and many more.  Many can be found free online or on your phone via a mobile samara.    Why should I do brain exercises before my surgery?  More recent research has shown brain exercise before surgery can lower the risk of postoperative delirium (confusion) which can be especially important for older adults.  Patients who did brain exercises for 5 to 10 hours the days before surgery, cut their risk of postoperative delirium in half up to 1 week after surgery.         [facility]    Preoperative Deep Breathing Exercises    Why it is important to do deep breathing exercises before my surgery?  Deep breathing exercises strengthen your breathing muscles.  This helps you to recover after your surgery and decreases the chance of breathing complications.      How are the deep breathing exercises done?  Sit straight with your back supported.  Breathe in deeply and slowly through your nose. Your lower rib cage should expand and your abdomen may move forward.  Hold that breath for 3 to 5 seconds.  Breathe out through pursed lips, slowly and completely.  Rest and repeat 10 times every hour while awake.  Rest longer if you become dizzy or lightheaded.         Patient and Family Education             Ways You Can Help Prevent Blood Clots             This handout explains some simple things you can do to help prevent blood clots.      Blood clots are blockages that can form in the body's veins. When a blood clot forms in your deep veins, it may be called a deep vein thrombosis, or DVT for short. Blood clots can happen in any part of the body where blood flows, but they are most common in the arms and legs. If a piece of a blood clot breaks free and travels to the lungs, it is called a pulmonary embolus (PE). A PE can be a very serious problem.         Being in the hospital or having surgery can raise your  chances of getting a blood clot because you may not be well enough to move around as much as you normally do.         Ways you can help prevent blood clots in the hospital         Wearing SCDs. SCDs stands for Sequential Compression Devices.   SCDs are special sleeves that wrap around your legs  They attach to a pump that fills them with air to gently squeeze your legs every few minutes.   This helps return the blood in your legs to your heart.   SCDs should only be taken off when walking or bathing.   SCDs may not be comfortable, but they can help save your life.               Wearing compression stockings - if your doctor orders them. These special snug fitting stockings gently squeeze your legs to help blood flow.       Walking. Walking helps move the blood in your legs.   If your doctor says it is ok, try walking the halls at least   5 times a day. Ask us to help you get up, so you don't fall.      Taking any blood thinning medicines your doctor orders.        Page 1 of 2     [facility]; 3/23   Ways you can help prevent blood clots at home       Wearing compression stockings - if your doctor orders them. ? Walking - to help move the blood in your legs.       Taking any blood thinning medicines your doctor orders.      Signs of a blood clot or PE      Tell your doctor or nurse know right away if you have of the problems listed below.    If you are at home, seek medical care right away. Call 911 for chest pain or problems breathing.               Signs of a blood clot (DVT) - such as pain,  swelling, redness or warmth in your arm or leg      Signs of a pulmonary embolism (PE) - such as chest     pain or feeling short of breath

## 2025-05-20 NOTE — CPM/PAT H&P
CPM/PAT Evaluation       Name: Winter Feliz (Winter Feliz)  /Age: 1970/54 y.o.     Visit Type:   In-Person       Chief Complaint: Sarcoma (Multi)    HPI  Winter Feliz is a 54 y.o. 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       Medical History[1]    Surgical History[2]    Patient  reports that she is not currently sexually active and has had partner(s) who are male. She reports using the following method of birth control/protection: Post-menopausal.    Family History[3]    Allergies[4]    Prior to Admission medications    Medication Sig Start Date End Date Taking? Authorizing Provider   albuterol-budesonide (Airsupra) 90-80 mcg/actuation inhaler Inhale 2 puffs 4 times a day as needed. 24  Historical Provider, MD   amLODIPine (Norvasc) 5 mg tablet Take 1 tablet (5 mg) by mouth once daily. 5/3/24   Historical Provider, MD Burks Ellipta 200-25 mcg/dose inhaler INHALE 1 PUFF ONCE A DAY 90 23   Historical Provider, MD   buPROPion XL (Wellbutrin XL) 150 mg 24 hr tablet Take 1 tablet (150 mg) by mouth once daily. 23   Historical Provider, MD   chlorthalidone (Hygroton) 25 mg tablet TAKE 1 TAB BY MOUTH EVERY MORNING WITH FOOD 25   Marlee Campbell MD   citalopram (CeleXA) 40 mg tablet Take 1 tablet (40 mg) by mouth once daily. 3/1/24   Historical Provider, MD   ergocalciferol (Vitamin D-2) 1.25 MG (13003 UT) capsule TAKE 1 CAPSULE (46981 UNITS) BY MOUTH ONE TIME PER WEEK 25   Camille Monson MD   etanercept (Enbrel) 50 mg/mL (1 mL) injection Inject 1 mL (50 mg) under the skin 1 (one) time per week. 25  Camille Monson MD   ezetimibe (Zetia) 10 mg tablet TAKE 1 TABLET BY MOUTH EVERY DAY 24   Ronny Rizzo MD   folic acid (Folvite) 1  mg tablet TAKE 1 TABLET ORALLY ONCE A DAY EXCEPT THE DAY OF METHOTREXATE 90 DAYS 11/4/24   Camille Monson MD   pantoprazole (ProtoNix) 40 mg EC tablet Take 1 tablet (40 mg) by mouth 2 times a day.    Historical Provider, MD   potassium chloride CR 10 mEq ER tablet  4/2/25   Historical Provider, MD   predniSONE (Deltasone) 50 mg tablet Take 13hrs, 7hrs and 1 hour prior to scans for contrast allergy patients. 5/13/25   Enzo Villa MD MPH   rosuvastatin (Crestor) 40 mg tablet TAKE 1 TABLET BY MOUTH EVERY DAY 11/4/24   Jacqueline Reddy, APRN-CNP   scopolamine (Transderm-Scop) 1 mg over 3 days patch 3 day Place 1 patch on the skin. 3/28/25 5/27/25  Historical Provider, MD   Synthroid 100 mcg tablet Take 1 tablet (100 mcg) by mouth once daily in the morning. Take before meals. 5/3/24   Historical Provider, MD   oxyCODONE (Roxicodone) 5 mg immediate release tablet 1 tablet (5 mg).  Patient not taking: Reported on 5/20/2025 3/21/25 5/20/25  Historical Provider, MD NOBLE ROS:   Constitutional:   neg    Neuro/Psych:   neg    Eyes:    use of corrective lenses  Ears:    tinnitus  Nose:   neg    Mouth:   neg    Throat:   neg    Neck:   neg    Cardio:   neg    Respiratory:   neg    Endocrine:   neg    GI:    nausea  :   neg    Musculoskeletal:    arthralgias   myalgias  Hematologic:   neg    Skin:  neg        Physical Exam  HENT:      Head: Normocephalic and atraumatic.      Nose: Nose normal.      Mouth/Throat:      Mouth: Mucous membranes are moist.   Cardiovascular:      Rate and Rhythm: Normal rate and regular rhythm.      Pulses: Normal pulses.   Pulmonary:      Effort: Pulmonary effort is normal.   Abdominal:      Palpations: Abdomen is soft.   Musculoskeletal:         General: Normal range of motion.      Cervical back: Normal range of motion.   Skin:     General: Skin is warm.   Neurological:      General: No focal deficit present.      Mental Status: She is alert and oriented to person, place, and  "time.   Psychiatric:         Mood and Affect: Mood normal.         Behavior: Behavior normal.          PAT AIRWAY:   Airway:     Mallampati::  II    TM distance::  >3 FB    Neck ROM::  Full  normal        Testing/Diagnostic:     Patient Specialist/PCP:     Visit Vitals  /89   Pulse 77   Temp 36.3 °C (97.3 °F)   Resp 16   Ht 1.6 m (5' 3\")   Wt 80.7 kg (178 lb)   SpO2 98%   BMI 31.53 kg/m²   OB Status Postmenopausal   Smoking Status Never   BSA 1.89 m²       DASI Risk Score      Flowsheet Row Pre-Admission Testing from 5/20/2025 in [facility]   Can you take care of yourself (eat, dress, bathe, or use toilet)?  2.75 filed at 05/20/2025 0854   Can you walk indoors, such as around your house? 1.75 filed at 05/20/2025 0854   Can you walk a block or two on level ground?  0 filed at 05/20/2025 0854   Can you climb a flight of stairs or walk up a hill? 0 filed at 05/20/2025 0854   Can you run a short distance? 0 filed at 05/20/2025 0854   Can you do light work around the house like dusting or washing dishes? 2.7 filed at 05/20/2025 0854   Can you do moderate work around the house like vacuuming, sweeping floors or carrying groceries? 0 filed at 05/20/2025 0854   Can you do heavy work around the house like scrubbing floors or lifting and moving heavy furniture?  0 filed at 05/20/2025 0854   Can you do yard work like raking leaves, weeding or pushing a mower? 0 filed at 05/20/2025 0854   Can you have sexual relations? 0 filed at 05/20/2025 0854   Can you participate in moderate recreational activities like golf, bowling, dancing, doubles tennis or throwing a baseball or football? 0 filed at 05/20/2025 0854   Can you participate in strenous sports like swimming, singles tennis, football, basketball, or skiing? 0 filed at 05/20/2025 0854   DASI SCORE 7.2 filed at 05/20/2025 0854   METS Score (Will be calculated only when all the questions are answered) 3.6 filed at 05/20/2025 0854          Caprini DVT " Assessment      Flowsheet Row ED to Hosp-Admission (Discharged) from 3/19/2025 in [facility] with Rebekah Byrd DO and Anderson Bonds DO   DVT Score (IF A SCORE IS NOT CALCULATING, MUST SELECT A BMI TO COMPLETE) 3 filed at 03/20/2025 0325   Medical Factors Swollen legs filed at 03/20/2025 0325   BMI (BMI MUST BE CHOSEN) 30 or less filed at 03/20/2025 0325          Modified Frailty Index    No data to display       [id number] Stroke Risk Points  Current as of just now        N/A 0 to 9 Points:      Last Change: N/A          The [id number] risk score (Lip GH, et al. 2009. © 2010 American [employer]) quantifies the risk of stroke for a patient with atrial fibrillation. For patients without atrial fibrillation or under the age of 18 this score appears as N/A. Higher score values generally indicate higher risk of stroke.        This score is not applicable to this patient. Components are not calculated.          Revised Cardiac Risk Index    No data to display       Apfel Simplified Score    No data to display       Risk Analysis Index Results This Encounter    No data found in the last 10 encounters.       Stop Bang Score      Flowsheet Row Pre-Admission Testing from 5/20/2025 in [facility]   Do you snore loudly? 1 filed at 05/20/2025 0854   Do you often feel tired or fatigued after your sleep? 1 filed at 05/20/2025 0854   Has anyone ever observed you stop breathing in your sleep? 1 filed at 05/20/2025 0854   Do you have or are you being treated for high blood pressure? 1 filed at 05/20/2025 0854   Recent BMI (Calculated) 31.5 filed at 05/20/2025 0854   Is BMI greater than 35 kg/m2? 0=No filed at 05/20/2025 0854   Age older than 50 years old? 1=Yes filed at 05/20/2025 0854   Is your neck circumference greater than 17 inches (Male) or 16 inches (Female)? 0 filed at 05/20/2025 0854   Gender - Male 0=No filed at 05/20/2025 0854   STOP-BANG Total Score 5  filed at 05/20/2025 0854          Prodigy: High Risk  Total Score: 0          ARISCAT Score for Postoperative Pulmonary Complications    No data to display       Pulido Perioperative Risk for Myocardial Infarction or Cardiac Arrest (ARBEN)    No data to display         Assessment and Plan:     Anesthesia  The patient notes anesthesia complications in the past related to PONV    Neurology  The patient has anxiety and depression. The patient is at increased risk for perioperative stroke secondary to hypertension , female gender, general anesthesia, operative time >2.5 hours. Handouts for preoperative brain exercises given to patient.    HEENT/Airway  No documented or reported history of airway difficulty.     Cardiovascular  The patient is scheduled for non-cardiac surgery associated with elevated risk. The patient has no major cardiac contraindications to non- cardiac surgery.    EKG  The patient has no EKG or echocardiographic changes concerning for myocardial ischemia.     Heart Failure  The patient has no known history of heart failure.  Additionally, the patient reports no symptoms of heart failure and demonstrates no signs of heart failure.    Hypertension Evaluation  The patient has a known history of hypertension that is controlled.  Patient's hypertension is most consistent with stage 1.    Heart Rhythm Evaluation  The patient has no history of arrhythmias.    Heart Valve Evaluation  The patient has no known history of valvular heart disease. The patient has no symptoms or physical exam findings to suggest valvular heart disease.    Cardiology Evaluation  The patient is not followed by cardiology.    Pulmonary  The patient has IGGY-INSPIRE implanted and asthma. The patient is at increased risk of perioperative pulmonary complications secondary to IGGY.  Pt instructed to bring INSPIRE materials day of surgery  RCRI  The patient meets 1 RCRI criteria and therefore has a 6% risk of major adverse cardiac  complications.    METS  The patient's functional capacity is greater than 4 METS.    ARBEN score which indicates a 0% risk of intraoperative or 30-day postoperative MACE (major adverse cardiac event).    The patient has a stop bang score of 5, which places patient at high risk for having IGGY.    ARISCAT 26, Intermediate, 13.3% risk of in-hospital postoperative pulmonary complications    PRODIGY 5, low risk of respiratory depression episode. Patient given PI sheet for preoperative deep breathing exercises.    Caprini score 11, high risk of perioperative VTE.     Eat 10- 0,  self-perceived oropharyngeal dysphagia scale (0-40)     Hematology  No diagnoses or significant findings on chart review or clinical presentation and evaluation.    Antiplatelet management   The patient is not currently receiving antiplatelet therapy.    Anticoagulation management  The patient is not currently receiving anticoagulation therapy.    Patient instructed to ambulate as soon as possible postoperatively to decrease thromboembolic risk. Initiate mechanical DVT prophylaxis as soon as possible and initiate chemical prophylaxis when deemed safe from a bleeding standpoint post surgery.     Transfusion Evaluation  A type and screen was obtained given the likelihood for perioperative transfusion of blood or blood products.    Gastrointestinal  The patient has GERD    Genitourinary  No diagnoses or significant findings on chart review or clinical presentation and evaluation.    Renal  No renal diagnoses or significant findings on chart review or clinical presentation and evaluation. The patient has specific risk factors associated with increased risk of perioperative renal complications related to age greater than 55, hypertension.    Musculoskeletal  The patient has Psoriatic arthritis    Endocrine  Diabetes Evaluation  The patient has no history of diabetes mellitus.    Thyroid Disease Evaluation  History of thyroid cancer s/p total  thyroidectomy    ID  No diagnoses or significant findings on chart review or clinical presentation and evaluation.    -Preoperative medication instructions were provided and reviewed with the patient.  Any additional testing or evaluation was explained to the patient.  NPO Instructions were discussed, and the patient's questions were answered prior to conclusion of this encounter. Patient verbalized understanding of preoperative instructions. After Visit Summary given.        Recent Results (from the past week)   ECG 12 Lead    Collection Time: 05/20/25  8:30 AM   Result Value Ref Range    Ventricular Rate 72 BPM    Atrial Rate 72 BPM    TN Interval 146 ms    QRS Duration 82 ms    QT Interval 434 ms    QTC Calculation(Bazett) 475 ms    P Axis 29 degrees    R Axis 65 degrees    T Axis 59 degrees    QRS Count 12 beats    Q Onset 228 ms    P Onset 155 ms    P Offset 218 ms    T Offset 445 ms    QTC Fredericia 461 ms   Type And Screen Is this order related to pregnancy or an upcoming surgery? Yes; Where will this surgery/delivery be performed? [facility]; What is the date of the surgery? 5/28/2025; Has this patient ever had a transfusion? No; Has t...    Collection Time: 05/20/25  9:29 AM   Result Value Ref Range    ABO TYPE A     Rh TYPE POS     ANTIBODY SCREEN NEG    CBC    Collection Time: 05/20/25  9:29 AM   Result Value Ref Range    WBC 9.6 4.4 - 11.3 x10*3/uL    nRBC 0.0 0.0 - 0.0 /100 WBCs    RBC 4.75 4.00 - 5.20 x10*6/uL    Hemoglobin 12.4 12.0 - 16.0 g/dL    Hematocrit 38.5 36.0 - 46.0 %    MCV 81 80 - 100 fL    MCH 26.1 26.0 - 34.0 pg    MCHC 32.2 32.0 - 36.0 g/dL    RDW 13.1 11.5 - 14.5 %    Platelets 376 150 - 450 x10*3/uL           [1]   Past Medical History:  Diagnosis Date    Asthma     Body mass index (BMI) 33.0-33.9, adult 05/01/2022    BMI 33.0-33.9,adult    Cyst of right kidney     resolved per last U/S    Disease of thyroid gland 2007    Dyslipidemia     Gallstones     GERD  (gastroesophageal reflux disease)     History of dysphagia     since thyroidectomy    Hypertension     Hypothyroidism     IGGY (obstructive sleep apnea)     PONV (postoperative nausea and vomiting) 2007    Psoriasis     Psoriatic arthritis (Multi)     Snoring     Spondyloarthritis     Thyroid cancer (Multi)    [2]   Past Surgical History:  Procedure Laterality Date     SECTION, CLASSIC  2016     Section     SECTION, LOW TRANSVERSE  , , ,     ENDOMETRIAL ABLATION  2007    FOOT SURGERY      cyst removed from L  foot between 1st and 2nd MTP    HAND SURGERY Right     R  cyst removal    OTHER SURGICAL HISTORY      hypoglossal nerve stimulator- INSPIRE    TOTAL THYROIDECTOMY  2007    first partial, then total   [3]   Family History  Problem Relation Name Age of Onset    Breast cancer Mother Stacey 59        metastatic    Cancer Mother Stacey     Alcohol abuse Father Sadiq Ivoryana     No Known Problems Daughter      No Known Problems Son      Hypertension Maternal Grandmother Grandma     Glaucoma Maternal Grandmother Grandma     Heart disease Maternal Grandmother Grandma     Stroke Maternal Grandmother Grandma     No Known Problems Maternal Grandfather      No Known Problems Paternal Grandmother      No Known Problems Paternal Grandfather      No Known Problems Mother's Sister     [4]   Allergies  Allergen Reactions    Lipitor [Atorvastatin] Other     Muscle aches    Methotrexate GI Upset    Iodine Other     vomiting

## 2025-05-21 ENCOUNTER — PREP FOR PROCEDURE (OUTPATIENT)
Dept: CRITICAL CARE MEDICINE | Facility: HOSPITAL | Age: 55
End: 2025-05-21
Payer: COMMERCIAL

## 2025-05-21 DIAGNOSIS — Z00.6 RESEARCH SUBJECT: Primary | ICD-10-CM

## 2025-05-22 LAB
ATRIAL RATE: 72 BPM
P AXIS: 29 DEGREES
P OFFSET: 218 MS
P ONSET: 155 MS
PR INTERVAL: 146 MS
Q ONSET: 228 MS
QRS COUNT: 12 BEATS
QRS DURATION: 82 MS
QT INTERVAL: 434 MS
QTC CALCULATION(BAZETT): 475 MS
QTC FREDERICIA: 461 MS
R AXIS: 65 DEGREES
T AXIS: 59 DEGREES
T OFFSET: 445 MS
VENTRICULAR RATE: 72 BPM

## 2025-05-27 ENCOUNTER — ANESTHESIA EVENT (OUTPATIENT)
Dept: OPERATING ROOM | Facility: HOSPITAL | Age: 55
End: 2025-05-27
Payer: COMMERCIAL

## 2025-05-27 DIAGNOSIS — C49.9 SARCOMA (MULTI): ICD-10-CM

## 2025-05-27 RX ORDER — NEOMYCIN SULFATE 500 MG/1
TABLET ORAL
Qty: 6 TABLET | Refills: 0 | Status: SHIPPED | OUTPATIENT
Start: 2025-05-27 | End: 2025-06-04 | Stop reason: HOSPADM

## 2025-05-27 RX ORDER — METRONIDAZOLE 500 MG/1
TABLET ORAL
Qty: 3 TABLET | Refills: 0 | Status: SHIPPED | OUTPATIENT
Start: 2025-05-27 | End: 2025-06-04 | Stop reason: HOSPADM

## 2025-05-27 NOTE — PROGRESS NOTES
,Pharmacy Medication History Review    Winter Feliz is a 54 y.o. female who is planned to be admitted for Sarcoma (Multi). Pharmacy called the patient prior to their scheduled procedure and reviewed the patient's [id number] medications for accuracy.    Medications ADDED:  none  Medications CHANGED:  none  Medications REMOVED:   none    Please review updated prior to admission medication list and comments regarding how patient may be taking medications differently by going to Admission tab --> Admission Orders --> Admit Orders / Review prior to admission medications.     Preferred pharmacy, last doses of medications, and allergies to be confirmed with patient by nursing the day of procedure.     Sources used to complete the med history include:  [facility]  Pharmacy dispense history  Patient Interview Good historian  Chart Review  Care Everywhere     Below are additional concerns with the patient's PTA list.  Patient states enbrel is on hold for the last six weeks in preparation of the procedure  Patient states they must use brand synthroid  Patient states they use airsupra inhaler as needed.     Rafaela Delgado [facility]  Please reach out via Secure Chat for questions

## 2025-05-28 ENCOUNTER — ANESTHESIA (OUTPATIENT)
Dept: OPERATING ROOM | Facility: HOSPITAL | Age: 55
End: 2025-05-28
Payer: COMMERCIAL

## 2025-05-28 ENCOUNTER — HOSPITAL ENCOUNTER (INPATIENT)
Facility: HOSPITAL | Age: 55
End: 2025-05-28
Attending: SURGERY | Admitting: SURGERY
Payer: COMMERCIAL

## 2025-05-28 DIAGNOSIS — Z00.6 RESEARCH SUBJECT: ICD-10-CM

## 2025-05-28 DIAGNOSIS — R06.89 ACUTE RESPIRATORY INSUFFICIENCY: ICD-10-CM

## 2025-05-28 DIAGNOSIS — R26.81 UNSTEADY GAIT: ICD-10-CM

## 2025-05-28 DIAGNOSIS — C49.9 SARCOMA (MULTI): Primary | ICD-10-CM

## 2025-05-28 PROBLEM — Z98.890 PONV (POSTOPERATIVE NAUSEA AND VOMITING): Status: ACTIVE | Noted: 2025-05-28

## 2025-05-28 PROBLEM — R11.2 PONV (POSTOPERATIVE NAUSEA AND VOMITING): Status: ACTIVE | Noted: 2025-05-28

## 2025-05-28 LAB
ABO GROUP (TYPE) IN BLOOD: NORMAL
ANION GAP BLDA CALCULATED.4IONS-SCNC: 14 MMO/L (ref 10–25)
ANION GAP BLDA CALCULATED.4IONS-SCNC: 15 MMO/L (ref 10–25)
ANION GAP BLDA CALCULATED.4IONS-SCNC: 15 MMO/L (ref 10–25)
ANION GAP BLDA CALCULATED.4IONS-SCNC: 18 MMO/L (ref 10–25)
BASE EXCESS BLDA CALC-SCNC: -4.4 MMOL/L (ref -2–3)
BASE EXCESS BLDA CALC-SCNC: -5.3 MMOL/L (ref -2–3)
BASE EXCESS BLDA CALC-SCNC: -6 MMOL/L (ref -2–3)
BASE EXCESS BLDA CALC-SCNC: -6.2 MMOL/L (ref -2–3)
BODY TEMPERATURE: 37 DEGREES CELSIUS
CA-I BLDA-SCNC: 1.02 MMOL/L (ref 1.1–1.33)
CA-I BLDA-SCNC: 1.05 MMOL/L (ref 1.1–1.33)
CA-I BLDA-SCNC: 1.06 MMOL/L (ref 1.1–1.33)
CA-I BLDA-SCNC: 1.19 MMOL/L (ref 1.1–1.33)
CHLORIDE BLDA-SCNC: 103 MMOL/L (ref 98–107)
CHLORIDE BLDA-SCNC: 106 MMOL/L (ref 98–107)
CHLORIDE BLDA-SCNC: 106 MMOL/L (ref 98–107)
CHLORIDE BLDA-SCNC: 107 MMOL/L (ref 98–107)
COHGB MFR BLDA: 0.9 %
DO-HGB MFR BLDA: 2.4 % (ref 0–5)
GLUCOSE BLDA-MCNC: 156 MG/DL (ref 74–99)
GLUCOSE BLDA-MCNC: 158 MG/DL (ref 74–99)
GLUCOSE BLDA-MCNC: 171 MG/DL (ref 74–99)
GLUCOSE BLDA-MCNC: 178 MG/DL (ref 74–99)
HCO3 BLDA-SCNC: 19.6 MMOL/L (ref 22–26)
HCO3 BLDA-SCNC: 19.6 MMOL/L (ref 22–26)
HCO3 BLDA-SCNC: 20 MMOL/L (ref 22–26)
HCO3 BLDA-SCNC: 20.1 MMOL/L (ref 22–26)
HCT VFR BLD EST: 30 % (ref 36–46)
HCT VFR BLD EST: 32 % (ref 36–46)
HCT VFR BLD EST: 33 % (ref 36–46)
HCT VFR BLD EST: 44 % (ref 36–46)
HGB BLDA-MCNC: 10.7 G/DL (ref 12–16)
HGB BLDA-MCNC: 11.1 G/DL (ref 12–16)
HGB BLDA-MCNC: 11.1 G/DL (ref 12–16)
HGB BLDA-MCNC: 14.6 G/DL (ref 12–16)
HGB BLDA-MCNC: 9.9 G/DL (ref 12–16)
HOLD SPECIMEN: NORMAL
INHALED O2 CONCENTRATION: 50 %
INHALED O2 CONCENTRATION: 60 %
LACTATE BLDA-SCNC: 2.1 MMOL/L (ref 0.4–2)
LACTATE BLDA-SCNC: 2.4 MMOL/L (ref 0.4–2)
LACTATE BLDA-SCNC: 3 MMOL/L (ref 0.4–2)
LACTATE BLDA-SCNC: 3.8 MMOL/L (ref 0.4–2)
METHGB MFR BLDA: 0.8 % (ref 0–1.5)
OXYHGB MFR BLDA: 95.9 % (ref 94–98)
OXYHGB MFR BLDA: 96 % (ref 94–98)
OXYHGB MFR BLDA: 96 % (ref 94–98)
OXYHGB MFR BLDA: 96.8 % (ref 94–98)
OXYHGB MFR BLDA: 97.4 % (ref 94–98)
PCO2 BLDA: 34 MM HG (ref 38–42)
PCO2 BLDA: 37 MM HG (ref 38–42)
PCO2 BLDA: 38 MM HG (ref 38–42)
PCO2 BLDA: 39 MM HG (ref 38–42)
PH BLDA: 7.31 PH (ref 7.38–7.42)
PH BLDA: 7.32 PH (ref 7.38–7.42)
PH BLDA: 7.34 PH (ref 7.38–7.42)
PH BLDA: 7.38 PH (ref 7.38–7.42)
PO2 BLDA: 102 MM HG (ref 85–95)
PO2 BLDA: 118 MM HG (ref 85–95)
PO2 BLDA: 124 MM HG (ref 85–95)
PO2 BLDA: 83 MM HG (ref 85–95)
POTASSIUM BLDA-SCNC: 2 MMOL/L (ref 3.5–5.3)
POTASSIUM BLDA-SCNC: 2.2 MMOL/L (ref 3.5–5.3)
POTASSIUM BLDA-SCNC: 2.7 MMOL/L (ref 3.5–5.3)
POTASSIUM BLDA-SCNC: 3 MMOL/L (ref 3.5–5.3)
RH FACTOR (ANTIGEN D): NORMAL
SAO2 % BLDA: 98 % (ref 94–100)
SAO2 % BLDA: 98 % (ref 94–100)
SAO2 % BLDA: 99 % (ref 94–100)
SAO2 % BLDA: 99 % (ref 94–100)
SODIUM BLDA-SCNC: 138 MMOL/L (ref 136–145)
SODIUM BLDA-SCNC: 139 MMOL/L (ref 136–145)

## 2025-05-28 PROCEDURE — 2500000005 HC RX 250 GENERAL PHARMACY W/O HCPCS

## 2025-05-28 PROCEDURE — 99231 SBSQ HOSP IP/OBS SF/LOW 25: CPT

## 2025-05-28 PROCEDURE — 2500000004 HC RX 250 GENERAL PHARMACY W/ HCPCS (ALT 636 FOR OP/ED): Performed by: SURGERY

## 2025-05-28 PROCEDURE — 2500000002 HC RX 250 W HCPCS SELF ADMINISTERED DRUGS (ALT 637 FOR MEDICARE OP, ALT 636 FOR OP/ED)

## 2025-05-28 PROCEDURE — P9045 ALBUMIN (HUMAN), 5%, 250 ML: HCPCS | Mod: JZ,TB

## 2025-05-28 PROCEDURE — 3700000001 HC GENERAL ANESTHESIA TIME - INITIAL BASE CHARGE: Performed by: SURGERY

## 2025-05-28 PROCEDURE — 36415 COLL VENOUS BLD VENIPUNCTURE: CPT | Performed by: ANESTHESIOLOGY

## 2025-05-28 PROCEDURE — 1170000001 HC PRIVATE ONCOLOGY ROOM DAILY

## 2025-05-28 PROCEDURE — 87116 MYCOBACTERIA CULTURE: CPT | Performed by: SURGERY

## 2025-05-28 PROCEDURE — 87102 FUNGUS ISOLATION CULTURE: CPT | Performed by: SURGERY

## 2025-05-28 PROCEDURE — 88309 TISSUE EXAM BY PATHOLOGIST: CPT | Performed by: PATHOLOGY

## 2025-05-28 PROCEDURE — 2500000004 HC RX 250 GENERAL PHARMACY W/ HCPCS (ALT 636 FOR OP/ED): Mod: JZ

## 2025-05-28 PROCEDURE — 2500000001 HC RX 250 WO HCPCS SELF ADMINISTERED DRUGS (ALT 637 FOR MEDICARE OP)

## 2025-05-28 PROCEDURE — 85018 HEMOGLOBIN: CPT

## 2025-05-28 PROCEDURE — 49189 OPN EXC/DST NTRA-ABD 20.1-30: CPT | Performed by: SURGERY

## 2025-05-28 PROCEDURE — 2500000004 HC RX 250 GENERAL PHARMACY W/ HCPCS (ALT 636 FOR OP/ED)

## 2025-05-28 PROCEDURE — 3600000008 HC OR TIME - EACH INCREMENTAL 1 MINUTE - PROCEDURE LEVEL THREE: Performed by: SURGERY

## 2025-05-28 PROCEDURE — 2500000004 HC RX 250 GENERAL PHARMACY W/ HCPCS (ALT 636 FOR OP/ED): Mod: JW

## 2025-05-28 PROCEDURE — 2780000003 HC OR 278 NO HCPCS: Performed by: SURGERY

## 2025-05-28 PROCEDURE — 84132 ASSAY OF SERUM POTASSIUM: CPT

## 2025-05-28 PROCEDURE — 87205 SMEAR GRAM STAIN: CPT | Performed by: SURGERY

## 2025-05-28 PROCEDURE — 0DN90ZZ RELEASE DUODENUM, OPEN APPROACH: ICD-10-PCS | Performed by: SURGERY

## 2025-05-28 PROCEDURE — 3700000002 HC GENERAL ANESTHESIA TIME - EACH INCREMENTAL 1 MINUTE: Performed by: SURGERY

## 2025-05-28 PROCEDURE — 7100000002 HC RECOVERY ROOM TIME - EACH INCREMENTAL 1 MINUTE: Performed by: SURGERY

## 2025-05-28 PROCEDURE — 0WBH0ZZ EXCISION OF RETROPERITONEUM, OPEN APPROACH: ICD-10-PCS | Performed by: SURGERY

## 2025-05-28 PROCEDURE — 2500000005 HC RX 250 GENERAL PHARMACY W/O HCPCS: Performed by: SURGERY

## 2025-05-28 PROCEDURE — 0TT00ZZ RESECTION OF RIGHT KIDNEY, OPEN APPROACH: ICD-10-PCS | Performed by: SURGERY

## 2025-05-28 PROCEDURE — 7100000001 HC RECOVERY ROOM TIME - INITIAL BASE CHARGE: Performed by: SURGERY

## 2025-05-28 PROCEDURE — 88309 TISSUE EXAM BY PATHOLOGIST: CPT | Mod: TC,SUR | Performed by: SURGERY

## 2025-05-28 PROCEDURE — 50230 REMOVAL KIDNEY OPEN RADICAL: CPT | Performed by: SURGERY

## 2025-05-28 PROCEDURE — 2720000007 HC OR 272 NO HCPCS: Performed by: SURGERY

## 2025-05-28 PROCEDURE — 3600000003 HC OR TIME - INITIAL BASE CHARGE - PROCEDURE LEVEL THREE: Performed by: SURGERY

## 2025-05-28 RX ORDER — METRONIDAZOLE 500 MG/100ML
INJECTION, SOLUTION INTRAVENOUS AS NEEDED
Status: DISCONTINUED | OUTPATIENT
Start: 2025-05-28 | End: 2025-05-28

## 2025-05-28 RX ORDER — ONDANSETRON 4 MG/1
4 TABLET, ORALLY DISINTEGRATING ORAL EVERY 8 HOURS PRN
Status: DISCONTINUED | OUTPATIENT
Start: 2025-05-28 | End: 2025-06-04 | Stop reason: HOSPADM

## 2025-05-28 RX ORDER — CEFAZOLIN SODIUM 2 G/100ML
2 INJECTION, SOLUTION INTRAVENOUS ONCE
Status: DISCONTINUED | OUTPATIENT
Start: 2025-05-28 | End: 2025-05-28 | Stop reason: HOSPADM

## 2025-05-28 RX ORDER — HYDROMORPHONE HCL/0.9% NACL/PF 15 MG/30ML
PATIENT CONTROLLED ANALGESIA SYRINGE INTRAVENOUS CONTINUOUS
Refills: 0 | Status: DISCONTINUED | OUTPATIENT
Start: 2025-05-28 | End: 2025-05-30

## 2025-05-28 RX ORDER — PHENYLEPHRINE HCL IN 0.9% NACL 0.4MG/10ML
SYRINGE (ML) INTRAVENOUS AS NEEDED
Status: DISCONTINUED | OUTPATIENT
Start: 2025-05-28 | End: 2025-05-28

## 2025-05-28 RX ORDER — FENTANYL CITRATE 50 UG/ML
INJECTION, SOLUTION INTRAMUSCULAR; INTRAVENOUS AS NEEDED
Status: DISCONTINUED | OUTPATIENT
Start: 2025-05-28 | End: 2025-05-28

## 2025-05-28 RX ORDER — HYDROMORPHONE HYDROCHLORIDE 1 MG/ML
INJECTION, SOLUTION INTRAMUSCULAR; INTRAVENOUS; SUBCUTANEOUS AS NEEDED
Status: DISCONTINUED | OUTPATIENT
Start: 2025-05-28 | End: 2025-05-28

## 2025-05-28 RX ORDER — ONDANSETRON HYDROCHLORIDE 2 MG/ML
INJECTION, SOLUTION INTRAVENOUS AS NEEDED
Status: DISCONTINUED | OUTPATIENT
Start: 2025-05-28 | End: 2025-05-28

## 2025-05-28 RX ORDER — LEVOTHYROXINE SODIUM 100 UG/1
100 TABLET ORAL
Status: DISCONTINUED | OUTPATIENT
Start: 2025-05-29 | End: 2025-06-04 | Stop reason: HOSPADM

## 2025-05-28 RX ORDER — LIDOCAINE HYDROCHLORIDE 10 MG/ML
0.1 INJECTION, SOLUTION INFILTRATION; PERINEURAL ONCE
Status: DISCONTINUED | OUTPATIENT
Start: 2025-05-28 | End: 2025-05-28 | Stop reason: HOSPADM

## 2025-05-28 RX ORDER — AMLODIPINE BESYLATE 5 MG/1
5 TABLET ORAL DAILY
Status: DISCONTINUED | OUTPATIENT
Start: 2025-05-28 | End: 2025-06-04 | Stop reason: HOSPADM

## 2025-05-28 RX ORDER — CHLORTHALIDONE 25 MG/1
25 TABLET ORAL DAILY
Status: DISCONTINUED | OUTPATIENT
Start: 2025-05-28 | End: 2025-06-04 | Stop reason: HOSPADM

## 2025-05-28 RX ORDER — OXYCODONE HYDROCHLORIDE 5 MG/1
5 TABLET ORAL EVERY 4 HOURS PRN
Status: DISCONTINUED | OUTPATIENT
Start: 2025-05-28 | End: 2025-05-28

## 2025-05-28 RX ORDER — ROCURONIUM BROMIDE 10 MG/ML
INJECTION, SOLUTION INTRAVENOUS AS NEEDED
Status: DISCONTINUED | OUTPATIENT
Start: 2025-05-28 | End: 2025-05-28

## 2025-05-28 RX ORDER — ALBUTEROL SULFATE 0.83 MG/ML
2.5 SOLUTION RESPIRATORY (INHALATION) EVERY 6 HOURS PRN
Status: DISCONTINUED | OUTPATIENT
Start: 2025-05-28 | End: 2025-06-04 | Stop reason: HOSPADM

## 2025-05-28 RX ORDER — POTASSIUM CHLORIDE 750 MG/1
10 TABLET, FILM COATED, EXTENDED RELEASE ORAL DAILY
Status: DISCONTINUED | OUTPATIENT
Start: 2025-05-28 | End: 2025-05-31

## 2025-05-28 RX ORDER — POLYETHYLENE GLYCOL 3350 17 G/17G
17 POWDER, FOR SOLUTION ORAL DAILY
Status: DISCONTINUED | OUTPATIENT
Start: 2025-05-28 | End: 2025-06-04 | Stop reason: HOSPADM

## 2025-05-28 RX ORDER — ACETAMINOPHEN 10 MG/ML
INJECTION, SOLUTION INTRAVENOUS AS NEEDED
Status: DISCONTINUED | OUTPATIENT
Start: 2025-05-28 | End: 2025-05-28

## 2025-05-28 RX ORDER — SODIUM CHLORIDE, SODIUM LACTATE, POTASSIUM CHLORIDE, CALCIUM CHLORIDE 600; 310; 30; 20 MG/100ML; MG/100ML; MG/100ML; MG/100ML
100 INJECTION, SOLUTION INTRAVENOUS CONTINUOUS
Status: DISCONTINUED | OUTPATIENT
Start: 2025-05-28 | End: 2025-05-28 | Stop reason: HOSPADM

## 2025-05-28 RX ORDER — CEFAZOLIN 1 G/1
INJECTION, POWDER, FOR SOLUTION INTRAVENOUS AS NEEDED
Status: DISCONTINUED | OUTPATIENT
Start: 2025-05-28 | End: 2025-05-28

## 2025-05-28 RX ORDER — ALBUMIN HUMAN 50 G/1000ML
SOLUTION INTRAVENOUS AS NEEDED
Status: DISCONTINUED | OUTPATIENT
Start: 2025-05-28 | End: 2025-05-28

## 2025-05-28 RX ORDER — FLUTICASONE FUROATE AND VILANTEROL 200; 25 UG/1; UG/1
1 POWDER RESPIRATORY (INHALATION) DAILY
Status: DISCONTINUED | OUTPATIENT
Start: 2025-05-28 | End: 2025-06-04 | Stop reason: HOSPADM

## 2025-05-28 RX ORDER — AMOXICILLIN 250 MG
1 CAPSULE ORAL NIGHTLY
Status: DISCONTINUED | OUTPATIENT
Start: 2025-05-28 | End: 2025-06-04 | Stop reason: HOSPADM

## 2025-05-28 RX ORDER — HEPARIN SODIUM 5000 [USP'U]/ML
5000 INJECTION, SOLUTION INTRAVENOUS; SUBCUTANEOUS ONCE
Status: COMPLETED | OUTPATIENT
Start: 2025-05-28 | End: 2025-05-28

## 2025-05-28 RX ORDER — OXYCODONE HYDROCHLORIDE 5 MG/1
10 TABLET ORAL EVERY 4 HOURS PRN
Status: DISCONTINUED | OUTPATIENT
Start: 2025-05-28 | End: 2025-05-28

## 2025-05-28 RX ORDER — ONDANSETRON HYDROCHLORIDE 2 MG/ML
4 INJECTION, SOLUTION INTRAVENOUS EVERY 8 HOURS PRN
Status: DISCONTINUED | OUTPATIENT
Start: 2025-05-28 | End: 2025-06-04 | Stop reason: HOSPADM

## 2025-05-28 RX ORDER — POTASSIUM CHLORIDE 29.8 MG/ML
INJECTION INTRAVENOUS AS NEEDED
Status: DISCONTINUED | OUTPATIENT
Start: 2025-05-28 | End: 2025-05-28

## 2025-05-28 RX ORDER — ERGOCALCIFEROL 1.25 MG/1
1.25 CAPSULE ORAL
Status: DISCONTINUED | OUTPATIENT
Start: 2025-06-01 | End: 2025-06-04 | Stop reason: HOSPADM

## 2025-05-28 RX ORDER — SODIUM CHLORIDE 0.9 G/100ML
INJECTION, SOLUTION IRRIGATION AS NEEDED
Status: DISCONTINUED | OUTPATIENT
Start: 2025-05-28 | End: 2025-05-28 | Stop reason: HOSPADM

## 2025-05-28 RX ORDER — BUPROPION HYDROCHLORIDE 150 MG/1
150 TABLET ORAL DAILY
Status: DISCONTINUED | OUTPATIENT
Start: 2025-05-29 | End: 2025-06-04 | Stop reason: HOSPADM

## 2025-05-28 RX ORDER — ONDANSETRON HYDROCHLORIDE 2 MG/ML
4 INJECTION, SOLUTION INTRAVENOUS ONCE AS NEEDED
Status: DISCONTINUED | OUTPATIENT
Start: 2025-05-28 | End: 2025-05-28 | Stop reason: HOSPADM

## 2025-05-28 RX ORDER — PHENYLEPHRINE 10 MG/250 ML(40 MCG/ML)IN 0.9 % SOD.CHLORIDE INTRAVENOUS
CONTINUOUS PRN
Status: DISCONTINUED | OUTPATIENT
Start: 2025-05-28 | End: 2025-05-28

## 2025-05-28 RX ORDER — CALCIUM CHLORIDE INJECTION 100 MG/ML
INJECTION, SOLUTION INTRAVENOUS AS NEEDED
Status: DISCONTINUED | OUTPATIENT
Start: 2025-05-28 | End: 2025-05-28

## 2025-05-28 RX ORDER — EZETIMIBE 10 MG/1
10 TABLET ORAL DAILY
Status: DISCONTINUED | OUTPATIENT
Start: 2025-05-28 | End: 2025-06-04 | Stop reason: HOSPADM

## 2025-05-28 RX ORDER — ROPIVACAINE IN 0.9% SOD CHL/PF 0.2 %
14 PLASTIC BAG, INJECTION (ML) EPIDURAL CONTINUOUS
Status: DISCONTINUED | OUTPATIENT
Start: 2025-05-28 | End: 2025-06-02

## 2025-05-28 RX ORDER — ROSUVASTATIN CALCIUM 40 MG/1
40 TABLET, COATED ORAL DAILY
Status: DISCONTINUED | OUTPATIENT
Start: 2025-05-28 | End: 2025-06-04 | Stop reason: HOSPADM

## 2025-05-28 RX ORDER — PROPOFOL 10 MG/ML
INJECTION, EMULSION INTRAVENOUS AS NEEDED
Status: DISCONTINUED | OUTPATIENT
Start: 2025-05-28 | End: 2025-05-28

## 2025-05-28 RX ORDER — HYDROMORPHONE HYDROCHLORIDE 0.2 MG/ML
0.2 INJECTION INTRAMUSCULAR; INTRAVENOUS; SUBCUTANEOUS EVERY 5 MIN PRN
Status: DISCONTINUED | OUTPATIENT
Start: 2025-05-28 | End: 2025-05-28 | Stop reason: HOSPADM

## 2025-05-28 RX ORDER — NORETHINDRONE AND ETHINYL ESTRADIOL 0.5-0.035
KIT ORAL AS NEEDED
Status: DISCONTINUED | OUTPATIENT
Start: 2025-05-28 | End: 2025-05-28

## 2025-05-28 RX ORDER — ACETAMINOPHEN 325 MG/1
650 TABLET ORAL EVERY 6 HOURS
Status: DISCONTINUED | OUTPATIENT
Start: 2025-05-28 | End: 2025-06-04 | Stop reason: HOSPADM

## 2025-05-28 RX ORDER — SCOPOLAMINE 1 MG/3D
PATCH, EXTENDED RELEASE TRANSDERMAL AS NEEDED
Status: DISCONTINUED | OUTPATIENT
Start: 2025-05-28 | End: 2025-05-28

## 2025-05-28 RX ORDER — MIDAZOLAM HYDROCHLORIDE 1 MG/ML
INJECTION INTRAMUSCULAR; INTRAVENOUS AS NEEDED
Status: DISCONTINUED | OUTPATIENT
Start: 2025-05-28 | End: 2025-05-28

## 2025-05-28 RX ORDER — POTASSIUM CHLORIDE 14.9 MG/ML
INJECTION INTRAVENOUS AS NEEDED
Status: DISCONTINUED | OUTPATIENT
Start: 2025-05-28 | End: 2025-05-28

## 2025-05-28 RX ORDER — FOLIC ACID 1 MG/1
1 TABLET ORAL DAILY
Status: DISCONTINUED | OUTPATIENT
Start: 2025-05-28 | End: 2025-06-04 | Stop reason: HOSPADM

## 2025-05-28 RX ORDER — APREPITANT 40 MG/1
CAPSULE ORAL AS NEEDED
Status: DISCONTINUED | OUTPATIENT
Start: 2025-05-28 | End: 2025-05-28

## 2025-05-28 RX ORDER — CITALOPRAM 40 MG/1
40 TABLET ORAL DAILY
Status: DISCONTINUED | OUTPATIENT
Start: 2025-05-28 | End: 2025-06-04 | Stop reason: HOSPADM

## 2025-05-28 RX ORDER — SODIUM CHLORIDE, SODIUM LACTATE, POTASSIUM CHLORIDE, CALCIUM CHLORIDE 600; 310; 30; 20 MG/100ML; MG/100ML; MG/100ML; MG/100ML
100 INJECTION, SOLUTION INTRAVENOUS CONTINUOUS
Status: ACTIVE | OUTPATIENT
Start: 2025-05-28 | End: 2025-05-29

## 2025-05-28 RX ORDER — HEPARIN SODIUM 5000 [USP'U]/ML
5000 INJECTION, SOLUTION INTRAVENOUS; SUBCUTANEOUS 3 TIMES DAILY
Status: DISCONTINUED | OUTPATIENT
Start: 2025-05-29 | End: 2025-05-30

## 2025-05-28 RX ORDER — LIDOCAINE HYDROCHLORIDE 20 MG/ML
INJECTION, SOLUTION INFILTRATION; PERINEURAL AS NEEDED
Status: DISCONTINUED | OUTPATIENT
Start: 2025-05-28 | End: 2025-05-28

## 2025-05-28 RX ORDER — NALOXONE HYDROCHLORIDE 0.4 MG/ML
0.2 INJECTION, SOLUTION INTRAMUSCULAR; INTRAVENOUS; SUBCUTANEOUS AS NEEDED
Status: DISCONTINUED | OUTPATIENT
Start: 2025-05-28 | End: 2025-05-30

## 2025-05-28 RX ORDER — PANTOPRAZOLE SODIUM 40 MG/1
40 TABLET, DELAYED RELEASE ORAL 2 TIMES DAILY
Status: DISCONTINUED | OUTPATIENT
Start: 2025-05-28 | End: 2025-06-04 | Stop reason: HOSPADM

## 2025-05-28 RX ADMIN — ROCURONIUM BROMIDE 20 MG: 10 INJECTION INTRAVENOUS at 14:56

## 2025-05-28 RX ADMIN — Medication 280 MCG: at 13:38

## 2025-05-28 RX ADMIN — ALBUMIN HUMAN 250 ML: 0.05 INJECTION, SOLUTION INTRAVENOUS at 17:10

## 2025-05-28 RX ADMIN — DEXAMETHASONE SODIUM PHOSPHATE 8 MG: 4 INJECTION, SOLUTION INTRA-ARTICULAR; INTRALESIONAL; INTRAMUSCULAR; INTRAVENOUS; SOFT TISSUE at 13:09

## 2025-05-28 RX ADMIN — ROCURONIUM BROMIDE 10 MG: 10 INJECTION INTRAVENOUS at 14:06

## 2025-05-28 RX ADMIN — ROCURONIUM BROMIDE 20 MG: 10 INJECTION INTRAVENOUS at 16:13

## 2025-05-28 RX ADMIN — Medication 14 ML/HR: at 17:47

## 2025-05-28 RX ADMIN — HEPARIN SODIUM 5000 UNITS: 5000 INJECTION, SOLUTION INTRAVENOUS; SUBCUTANEOUS at 12:44

## 2025-05-28 RX ADMIN — Medication 100 MCG: at 15:48

## 2025-05-28 RX ADMIN — PHENYLEPHRINE-NACL IV SOLUTION 10 MG/250ML-0.9% 0.4 MCG/KG/MIN: 10-0.9/25 SOLUTION at 13:53

## 2025-05-28 RX ADMIN — PANTOPRAZOLE SODIUM 40 MG: 40 TABLET, DELAYED RELEASE ORAL at 21:22

## 2025-05-28 RX ADMIN — EPHEDRINE SULFATE 10 MG: 50 INJECTION INTRAVENOUS at 13:55

## 2025-05-28 RX ADMIN — APREPITANT 40 MG: 40 CAPSULE ORAL at 12:33

## 2025-05-28 RX ADMIN — CEFAZOLIN 2 G: 330 INJECTION, POWDER, FOR SOLUTION INTRAMUSCULAR; INTRAVENOUS at 17:02

## 2025-05-28 RX ADMIN — PROPOFOL 200 MG: 10 INJECTION, EMULSION INTRAVENOUS at 12:57

## 2025-05-28 RX ADMIN — ROCURONIUM BROMIDE 60 MG: 10 INJECTION INTRAVENOUS at 12:58

## 2025-05-28 RX ADMIN — SODIUM CHLORIDE, SODIUM LACTATE, POTASSIUM CHLORIDE, AND CALCIUM CHLORIDE: 600; 310; 30; 20 INJECTION, SOLUTION INTRAVENOUS at 12:53

## 2025-05-28 RX ADMIN — MIDAZOLAM HYDROCHLORIDE 2 MG: 2 INJECTION, SOLUTION INTRAMUSCULAR; INTRAVENOUS at 12:22

## 2025-05-28 RX ADMIN — SUGAMMADEX 200 MG: 100 INJECTION, SOLUTION INTRAVENOUS at 17:54

## 2025-05-28 RX ADMIN — ACETAMINOPHEN 1000 MG: 10 INJECTION, SOLUTION INTRAVENOUS at 17:38

## 2025-05-28 RX ADMIN — Medication 140 MCG: at 15:58

## 2025-05-28 RX ADMIN — EPHEDRINE SULFATE 15 MG: 50 INJECTION INTRAVENOUS at 13:47

## 2025-05-28 RX ADMIN — SENNOSIDES AND DOCUSATE SODIUM 1 TABLET: 50; 8.6 TABLET ORAL at 21:22

## 2025-05-28 RX ADMIN — ALBUMIN HUMAN 250 ML: 0.05 INJECTION, SOLUTION INTRAVENOUS at 15:48

## 2025-05-28 RX ADMIN — ACETAMINOPHEN 650 MG: 325 TABLET ORAL at 21:22

## 2025-05-28 RX ADMIN — Medication 80 MCG: at 16:22

## 2025-05-28 RX ADMIN — LIDOCAINE HYDROCHLORIDE 50 MG: 20 INJECTION, SOLUTION INFILTRATION; PERINEURAL at 12:57

## 2025-05-28 RX ADMIN — ROCURONIUM BROMIDE 10 MG: 10 INJECTION INTRAVENOUS at 14:02

## 2025-05-28 RX ADMIN — CEFAZOLIN 2 G: 330 INJECTION, POWDER, FOR SOLUTION INTRAMUSCULAR; INTRAVENOUS at 13:09

## 2025-05-28 RX ADMIN — ROCURONIUM BROMIDE 20 MG: 10 INJECTION INTRAVENOUS at 17:22

## 2025-05-28 RX ADMIN — SODIUM CHLORIDE, POTASSIUM CHLORIDE, SODIUM LACTATE AND CALCIUM CHLORIDE 100 ML/HR: 600; 310; 30; 20 INJECTION, SOLUTION INTRAVENOUS at 21:30

## 2025-05-28 RX ADMIN — FENTANYL CITRATE 50 MCG: 50 INJECTION, SOLUTION INTRAMUSCULAR; INTRAVENOUS at 12:57

## 2025-05-28 RX ADMIN — ALBUMIN HUMAN 250 ML: 0.05 INJECTION, SOLUTION INTRAVENOUS at 13:58

## 2025-05-28 RX ADMIN — EPHEDRINE SULFATE 10 MG: 50 INJECTION INTRAVENOUS at 14:57

## 2025-05-28 RX ADMIN — Medication: at 19:52

## 2025-05-28 RX ADMIN — Medication 80 MCG: at 15:32

## 2025-05-28 RX ADMIN — ONDANSETRON 4 MG: 2 INJECTION, SOLUTION INTRAMUSCULAR; INTRAVENOUS at 17:54

## 2025-05-28 RX ADMIN — Medication 120 MCG: at 14:14

## 2025-05-28 RX ADMIN — ALBUMIN HUMAN 250 ML: 0.05 INJECTION, SOLUTION INTRAVENOUS at 16:21

## 2025-05-28 RX ADMIN — POTASSIUM CHLORIDE 20 MEQ: 29.8 INJECTION, SOLUTION INTRAVENOUS at 16:37

## 2025-05-28 RX ADMIN — HYDROMORPHONE HYDROCHLORIDE 0.6 MG: 1 INJECTION, SOLUTION INTRAMUSCULAR; INTRAVENOUS; SUBCUTANEOUS at 17:47

## 2025-05-28 RX ADMIN — Medication 1 L/MIN: at 19:00

## 2025-05-28 RX ADMIN — METRONIDAZOLE 500 MG: 5 INJECTION, SOLUTION INTRAVENOUS at 13:09

## 2025-05-28 RX ADMIN — Medication 200 MCG: at 16:26

## 2025-05-28 RX ADMIN — PROPOFOL 50 MCG/KG/MIN: 10 INJECTION, EMULSION INTRAVENOUS at 13:05

## 2025-05-28 RX ADMIN — Medication 2 L/MIN: at 18:45

## 2025-05-28 RX ADMIN — CALCIUM CHLORIDE 0.5 G: 100 INJECTION, SOLUTION INTRAVENOUS at 16:33

## 2025-05-28 RX ADMIN — SCOPOLAMINE 1 PATCH: 1.5 PATCH, EXTENDED RELEASE TRANSDERMAL at 12:33

## 2025-05-28 RX ADMIN — POTASSIUM CHLORIDE 20 MEQ: 14.9 INJECTION, SOLUTION INTRAVENOUS at 15:52

## 2025-05-28 SDOH — SOCIAL STABILITY: SOCIAL INSECURITY: WITHIN THE LAST YEAR, HAVE YOU BEEN HUMILIATED OR EMOTIONALLY ABUSED IN OTHER WAYS BY YOUR PARTNER OR EX-PARTNER?: NO

## 2025-05-28 SDOH — HEALTH STABILITY: MENTAL HEALTH: HOW MANY DRINKS CONTAINING ALCOHOL DO YOU HAVE ON A TYPICAL DAY WHEN YOU ARE DRINKING?: PATIENT DOES NOT DRINK

## 2025-05-28 SDOH — SOCIAL STABILITY: SOCIAL INSECURITY: HAS ANYONE EVER THREATENED TO HURT YOUR FAMILY OR YOUR PETS?: NO

## 2025-05-28 SDOH — HEALTH STABILITY: MENTAL HEALTH: HOW OFTEN DO YOU HAVE A DRINK CONTAINING ALCOHOL?: NEVER

## 2025-05-28 SDOH — HEALTH STABILITY: MENTAL HEALTH: HOW OFTEN DO YOU HAVE SIX OR MORE DRINKS ON ONE OCCASION?: NEVER

## 2025-05-28 SDOH — ECONOMIC STABILITY: INCOME INSECURITY: IN THE PAST 12 MONTHS HAS THE ELECTRIC, GAS, OIL, OR WATER COMPANY THREATENED TO SHUT OFF SERVICES IN YOUR HOME?: NO

## 2025-05-28 SDOH — ECONOMIC STABILITY: HOUSING INSECURITY: IN THE LAST 12 MONTHS, WAS THERE A TIME WHEN YOU WERE NOT ABLE TO PAY THE MORTGAGE OR RENT ON TIME?: NO

## 2025-05-28 SDOH — ECONOMIC STABILITY: FOOD INSECURITY: HOW HARD IS IT FOR YOU TO PAY FOR THE VERY BASICS LIKE FOOD, HOUSING, MEDICAL CARE, AND HEATING?: NOT HARD AT ALL

## 2025-05-28 SDOH — SOCIAL STABILITY: SOCIAL INSECURITY: WITHIN THE LAST YEAR, HAVE YOU BEEN AFRAID OF YOUR PARTNER OR EX-PARTNER?: NO

## 2025-05-28 SDOH — SOCIAL STABILITY: SOCIAL INSECURITY: DO YOU FEEL ANYONE HAS EXPLOITED OR TAKEN ADVANTAGE OF YOU FINANCIALLY OR OF YOUR PERSONAL PROPERTY?: NO

## 2025-05-28 SDOH — ECONOMIC STABILITY: FOOD INSECURITY: WITHIN THE PAST 12 MONTHS, YOU WORRIED THAT YOUR FOOD WOULD RUN OUT BEFORE YOU GOT THE MONEY TO BUY MORE.: NEVER TRUE

## 2025-05-28 SDOH — ECONOMIC STABILITY: HOUSING INSECURITY: AT ANY TIME IN THE PAST 12 MONTHS, WERE YOU HOMELESS OR LIVING IN A SHELTER (INCLUDING NOW)?: NO

## 2025-05-28 SDOH — ECONOMIC STABILITY: HOUSING INSECURITY: IN THE PAST 12 MONTHS, HOW MANY TIMES HAVE YOU MOVED WHERE YOU WERE LIVING?: 0

## 2025-05-28 SDOH — ECONOMIC STABILITY: FOOD INSECURITY: WITHIN THE PAST 12 MONTHS, THE FOOD YOU BOUGHT JUST DIDN'T LAST AND YOU DIDN'T HAVE MONEY TO GET MORE.: NEVER TRUE

## 2025-05-28 SDOH — ECONOMIC STABILITY: TRANSPORTATION INSECURITY: IN THE PAST 12 MONTHS, HAS LACK OF TRANSPORTATION KEPT YOU FROM MEDICAL APPOINTMENTS OR FROM GETTING MEDICATIONS?: NO

## 2025-05-28 SDOH — SOCIAL STABILITY: SOCIAL INSECURITY: ARE YOU OR HAVE YOU BEEN THREATENED OR ABUSED PHYSICALLY, EMOTIONALLY, OR SEXUALLY BY ANYONE?: NO

## 2025-05-28 SDOH — SOCIAL STABILITY: SOCIAL INSECURITY: DO YOU FEEL UNSAFE GOING BACK TO THE PLACE WHERE YOU ARE LIVING?: NO

## 2025-05-28 SDOH — SOCIAL STABILITY: SOCIAL INSECURITY: ARE THERE ANY APPARENT SIGNS OF INJURIES/BEHAVIORS THAT COULD BE RELATED TO ABUSE/NEGLECT?: NO

## 2025-05-28 SDOH — SOCIAL STABILITY: SOCIAL INSECURITY: ABUSE: ADULT

## 2025-05-28 SDOH — SOCIAL STABILITY: SOCIAL INSECURITY: HAVE YOU HAD THOUGHTS OF HARMING ANYONE ELSE?: NO

## 2025-05-28 SDOH — SOCIAL STABILITY: SOCIAL INSECURITY: DOES ANYONE TRY TO KEEP YOU FROM HAVING/CONTACTING OTHER FRIENDS OR DOING THINGS OUTSIDE YOUR HOME?: NO

## 2025-05-28 SDOH — SOCIAL STABILITY: SOCIAL INSECURITY: WERE YOU ABLE TO COMPLETE ALL THE BEHAVIORAL HEALTH SCREENINGS?: YES

## 2025-05-28 ASSESSMENT — COGNITIVE AND FUNCTIONAL STATUS - GENERAL
WALKING IN HOSPITAL ROOM: A LITTLE
MOBILITY SCORE: 22
CLIMB 3 TO 5 STEPS WITH RAILING: A LITTLE
MOBILITY SCORE: 22
DAILY ACTIVITIY SCORE: 23
CLIMB 3 TO 5 STEPS WITH RAILING: A LITTLE
DRESSING REGULAR LOWER BODY CLOTHING: A LITTLE
WALKING IN HOSPITAL ROOM: A LITTLE
DRESSING REGULAR LOWER BODY CLOTHING: A LITTLE
PATIENT BASELINE BEDBOUND: NO
DAILY ACTIVITIY SCORE: 23

## 2025-05-28 ASSESSMENT — PAIN SCALES - GENERAL
PAINLEVEL_OUTOF10: 0 - NO PAIN
PAINLEVEL_OUTOF10: 6
PAINLEVEL_OUTOF10: 0 - NO PAIN
PAIN_LEVEL: 0
PAINLEVEL_OUTOF10: 0 - NO PAIN

## 2025-05-28 ASSESSMENT — PAIN - FUNCTIONAL ASSESSMENT
PAIN_FUNCTIONAL_ASSESSMENT: 0-10
PAIN_FUNCTIONAL_ASSESSMENT: UNABLE TO SELF-REPORT
PAIN_FUNCTIONAL_ASSESSMENT: 0-10

## 2025-05-28 ASSESSMENT — ACTIVITIES OF DAILY LIVING (ADL)
JUDGMENT_ADEQUATE_SAFELY_COMPLETE_DAILY_ACTIVITIES: YES
HEARING - LEFT EAR: FUNCTIONAL
PATIENT'S MEMORY ADEQUATE TO SAFELY COMPLETE DAILY ACTIVITIES?: YES
GROOMING: INDEPENDENT
TOILETING: INDEPENDENT
BATHING: INDEPENDENT
ASSISTIVE_DEVICE: EYEGLASSES
WALKS IN HOME: INDEPENDENT
DRESSING YOURSELF: INDEPENDENT
HEARING - RIGHT EAR: FUNCTIONAL
FEEDING YOURSELF: INDEPENDENT
LACK_OF_TRANSPORTATION: NO
ADEQUATE_TO_COMPLETE_ADL: YES

## 2025-05-28 ASSESSMENT — LIFESTYLE VARIABLES
SKIP TO QUESTIONS 9-10: 1
AUDIT-C TOTAL SCORE: 0

## 2025-05-28 ASSESSMENT — PATIENT HEALTH QUESTIONNAIRE - PHQ9
SUM OF ALL RESPONSES TO PHQ9 QUESTIONS 1 & 2: 0
1. LITTLE INTEREST OR PLEASURE IN DOING THINGS: NOT AT ALL
2. FEELING DOWN, DEPRESSED OR HOPELESS: NOT AT ALL

## 2025-05-28 ASSESSMENT — COLUMBIA-SUICIDE SEVERITY RATING SCALE - C-SSRS
6. HAVE YOU EVER DONE ANYTHING, STARTED TO DO ANYTHING, OR PREPARED TO DO ANYTHING TO END YOUR LIFE?: NO
1. IN THE PAST MONTH, HAVE YOU WISHED YOU WERE DEAD OR WISHED YOU COULD GO TO SLEEP AND NOT WAKE UP?: NO
2. HAVE YOU ACTUALLY HAD ANY THOUGHTS OF KILLING YOURSELF?: NO

## 2025-05-28 NOTE — BRIEF OP NOTE
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, ureteral stent removal    Surgeons      * Enzo Villa - Primary    Resident/Fellow/Other Assistant:  Surgeons and Role:     * Tres Tolbert MD - Assisting     * Bety Honeycutt MD - Assisting    Staff:   Relief Circulator: James  Scrub Person: Jerardo  Circulator: Trey  Relief Scrub: James  Relief Scrub: Maria T    Anesthesia Staff: Anesthesiologist: Rashad Mckeon MD; Sammy Gold MD; Masood Powell MD  Anesthesia Resident: Jeb Rangel DO; Kody Tinoco MD; Mahad Limon MD  Frontline Breaker: MERY Shields-CRNA    Procedure Summary  Anesthesia: General  ASA: III  Estimated Blood Loss: 400 mL  Intra-op Medications:   Administrations occurring from 1200 to 1600 on 25:   Medication Name Total Dose   sodium chloride 0.9 % irrigation solution 3,000 mL   heparin (porcine) injection 5,000 Units 5,000 Units   albumin human bottle 5% 500 mL   aprepitant (Emend) capsule 40 mg   ceFAZolin (Ancef) vial 1 g 2 g   dexAMETHasone (Decadron) 4 mg/mL IV Syringe 2 mL 8 mg   ePHEDrine injection 35 mg   fentaNYL (Sublimaze) injection 50 mcg/mL 50 mcg   LR bolus Cannot be calculated   lidocaine (Xylocaine) injection 2 % 50 mg   metroNIDAZOLE (Flagyl)  mg/100 mL (premix) 500 mg   midazolam PF (Versed) injection 1 mg/mL 2 mg   phenylephrine (Rey-Synephrine) 10 mg in sodium chloride 0.9%  250 mL (0.04 mg/mL) infusion (premix) 0.55 mg   phenylephrine 40 mcg/mL syringe 10 mL 720 mcg   potassium chloride 20 mEq in 100 mL IV premix 20 mEq   propofol (Diprivan) injection 10 mg/mL 581.02 mg   rocuronium (ZeMuron) 50 mg/5 mL injection 100 mg   scopolamine (Transderm-Scop) 1 mg/3 days patch 1 patch              Anesthesia Record               Intraprocedure I/O Totals          Intake    LR bolus 1800.00 mL    Phenylephrine Drip 0.00 mL    The total shown is the total volume documented since Anesthesia Start was filed.    albumin human bottle 5% 1000.00 mL    Total Intake 2800 mL       Output    Urine 465 mL    Est. Blood Loss 400 mL    Total Output 865 mL       Net    Net Volume 1935 mL          Specimen:   ID Type Source Tests Collected by Time   1 : RIGHT RETROPERITONEAL LIPOSARCOMA WITH RIGHT KIDNEY AND RIGHT URETER Tissue SOFT TISSUE RESECTION SURGICAL PATHOLOGY EXAM Enzo Villa MD MPH 5/28/2025 1725   A : RIGHT URETERAL STENT Tissue URETER RESECTION RIGHT AFB CULTURE/SMEAR, FUNGAL CULTURE/SMEAR, TISSUE/WOUND CULTURE/SMEAR Enzo Villa MD MPH 5/28/2025 1821        Findings: Large right retroperitoneal mass consistent with liposarcoma excised en bloc with right kidney. Significant adhesions around mass. Small IVC injury repaired primarily with prolene sutures.  Ureteral stent removed entirely.    Complications:  None; patient tolerated the procedure well.     Disposition: PACU - hemodynamically stable.  Condition: stable  Specimens Collected:   ID Type Source Tests Collected by Time   1 : RIGHT RETROPERITONEAL LIPOSARCOMA WITH RIGHT KIDNEY AND RIGHT URETER Tissue SOFT TISSUE RESECTION SURGICAL PATHOLOGY EXAM Enzo Villa MD MPH 5/28/2025 1725   A : RIGHT URETERAL STENT Tissue URETER RESECTION RIGHT AFB CULTURE/SMEAR, FUNGAL CULTURE/SMEAR, TISSUE/WOUND CULTURE/SMEAR Enzo Villa MD MPH 5/28/2025 1821     Attending Attestation:     Enzo Villa  Phone Number: [phone]

## 2025-05-28 NOTE — ANESTHESIA PROCEDURE NOTES
Arterial Line:    Date/Time: 5/28/2025 12:57 PM    Staffing  Performed: resident   Authorized by: Rashad Mckeon MD    Performed by: Mahad Limon MD    An arterial line was placed. Procedure performed using surface landmarks.in the OR for the following indication(s): continuous blood pressure monitoring and blood sampling needed.    A 20 gauge (size), 1 and 3/4 inch (length), Angiocath (type) catheter was placed into the Right radial artery, secured by Tegadelukas   Seldinger technique used.  Events:  hematoma during insertion, greater than 3 attempts and patient tolerated procedure well with no complications.      Additional notes:  Several attempts on L radial artery with no flash but noted hematoma. Additional attempts made on R radial with successful placement.

## 2025-05-28 NOTE — ANESTHESIA POSTPROCEDURE EVALUATION
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    Anesthesia Post Evaluation    Patient location during evaluation: PACU  Patient participation: complete - patient participated  Level of consciousness: sleepy but conscious  Pain score: 0  Pain management: adequate  Airway patency: patent  Cardiovascular status: acceptable  Respiratory status: acceptable  Hydration status: acceptable  Postoperative Nausea and Vomiting: none        No notable events documented.

## 2025-05-28 NOTE — ANESTHESIA PROCEDURE NOTES
Peripheral Block    Patient location during procedure: pre-op  Medication administered at: 5/28/2025 12:22 PM  End time: 5/28/2025 12:37 PM  Reason for block: at surgeon's request and post-op pain management  Staffing  Performed: resident and attending   Authorized by: Michel Ga MD    Performed by: Lama Yohan MD  Preanesthetic Checklist  Completed: patient identified, IV checked, site marked, risks and benefits discussed, surgical consent, monitors and equipment checked, pre-op evaluation and timeout performed   Timeout performed at: 5/28/2025 12:32 PM  Peripheral Block  Patient position: sitting  Prep: ChloraPrep  Patient monitoring: heart rate and continuous pulse ox  Block type: PAIGE  Laterality: B/L  Injection technique: catheter  Guidance: ultrasound guided  Local infiltration: lidocaine  Infiltration strength: 1 %  Dose: 3 mL  Needle  Needle type: Tuohy   Needle gauge: 22 G  Needle length: 8 cm  Needle localization: ultrasound guidance     image stored in chart  Assessment  Injection assessment: negative aspiration for heme, no paresthesia on injection, incremental injection and local visualized surrounding nerve on ultrasound  Additional Notes  Erector spinae plane block:     Prior to procedure: Following a focused history, procedure-related and patient-specific complications were discussed. Risks, benefits, and alternatives were explained. Informed, written consent was provided by the patient and/or surrogate decision maker for the block. Anticoagulation (if any) was held per KIANNA guidelines. ASA monitors were applied. Patient was positioned, prepped with chlorhexidine, and draped with sterile towels.     Ultrasound guidance was used to visualize the erector spinae muscle above the TP/costal junction at T7. Skin was numbed with 1% lidocaine. Needle was inserted and advanced towards target with visualization of the needle throughout duration of the procedure. A total of 50 cc of 0.5%  Spoke to patient and gave him results and instructions. Verbalized understanding.   ropivacaine, dexamethasone 8mg, and 300 mcg of 1:200,000 epinephrine, was divided and injected bilaterally. Catheters threaded and secured. Patient tolerated procedure well.    Timeout by Sabina SHANKS

## 2025-05-28 NOTE — SIGNIFICANT EVENT
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 Villa    Plan:  - admit to surgical oncology [facility] postop  - pain control with dPCA, Ropivacaine pain blocks, PO tylenol  - resume home meds: celexa, wellbutrin, albuterol PRN, rosuvastatin, ezetimibe, pantoprazole, levothyroxine, folic acid  - holding home amlodipine and chlorthalidone  - NPO with sips/chips, will likely advance to [facility] POD 1; bowel reg; zofran prn for nausea  - maintenance IVF while NPO @100 ml/hr  - AM labs including CBC, CMP, Mg  - maintain smith tonight, strict I/Os  - incision dressing to be removed by surg onc team POD 2  - reach out with questions or concerns    Discussed with Dr. Daisy Honeycutt MD  General Surgery Resident  Surgical Oncology  [place] [id number] / [place] [id number]

## 2025-05-28 NOTE — INTERVAL H&P NOTE
H&P reviewed. The patient was examined and there are no changes to the H&P.    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 The patient had a right ureteral stent placed. IR biopsy of a nodular portion of the retroperitoneal mass was consistent with a well-differentiated liposarcoma with sclerosis. Chest CT showed a 6 mm right lung lesion that is stable on imaging back to 2011.     Patient was presented at tumor boards 5/16/2025. Plan is for radical resection of right retroperitoneal liposarcoma with nephrectomy with Dr. Villa. Anticipate admission to [place] [id number] under surgical oncology service postop.      
seizures

## 2025-05-28 NOTE — ANESTHESIA PREPROCEDURE EVALUATION
Patient: Winter Feliz    Procedure Information       Date/Time: 05/28/25 1200    Procedure: Radical resection of right retroperitoneal sarcoma with right nephrectomy (Right)    Location: [facility] OR 22 / [facility] OR    Surgeons: Enzo Villa MD MPH            Relevant Problems   Anesthesia   (+) PONV (postoperative nausea and vomiting)      Cardiac   (+) Benign hypertension   (+) Hyperlipidemia      Pulmonary   (+) Asthma   (+) Asthma exacerbation (HHS-HCC)   (+) Mild intermittent asthma without complication (HHS-HCC)      Neuro   (+) Anxiety   (+) Depression with anxiety      GI   (+) Dysphagia   (+) Gastroesophageal reflux disease      /Renal   (+) Hydronephrosis      Liver   (+) Disorder of liver   (+) Gallstones      Endocrine   (+) Hypothyroidism   (+) Malignant neoplasm of thyroid gland (Multi)   (+) Postoperative hypothyroidism      Musculoskeletal   (+) Osteoarthritis, multiple sites      ID   (+) COVID-19       Clinical information reviewed:   Tobacco  Allergies  Meds   Med Hx  Surg Hx  OB Status  Fam Hx  Soc   Hx        NPO Detail:  NPO/Void Status  Date of Last Liquid: 05/27/25  Time of Last Liquid: 2300  Date of Last Solid: 05/27/25  Time of Last Solid: 2300         Physical Exam    Airway  Mallampati: II  TM distance: >3 FB  Neck ROM: full  Mouth opening: 3 or more finger widths     Cardiovascular - normal exam   Dental - normal exam     Pulmonary - normal exam   Abdominal - normal exam           Anesthesia Plan    History of general anesthesia?: yes  History of complications of general anesthesia?: no    ASA 3     general     intravenous induction   Trial extubation is planned.  Anesthetic plan and risks discussed with patient.  Use of blood products discussed with patient who consented to blood products.    Plan discussed with attending.

## 2025-05-28 NOTE — ANESTHESIA PROCEDURE NOTES
Peripheral IV  Date/Time: 5/28/2025 1:16 PM      Placement  Needle size: 18 G  Laterality: right  Location: hand  Local anesthetic: none  Site prep: chlorhexidine  Technique: anatomical landmarks  Attempts: 1

## 2025-05-28 NOTE — CONSULTS
Winter Feliz is a 54 y.o. year old female patient who presents for Procedure(s):  Radical resection of right retroperitoneal sarcoma with right nephrectomy with Enzo Villa MD MPH on 5/28/2025.  Acute Pain consulted for assistance with pain control.     Anticipated Postop Pain Issues -   Palliative: typically relieved with IV analgesics and regional local anesthetics  Provocative: typically with movement  Quality: typically burning and aching  Radiation: typically none  Severity: typically severe 8-10/10  Timing: typically constant    Medical History[1]     Surgical History[2]     Family History[3]     Social History     Socioeconomic History    Marital status:      Spouse name: Erik    Number of children: 4    Years of education: 2 associates    Highest education level: Associate degree: occupational, technical, or vocational program   Occupational History    Not on file   Tobacco Use    Smoking status: Never    Smokeless tobacco: Never   Vaping Use    Vaping status: Never Used   Substance and Sexual Activity    Alcohol use: Not Currently    Drug use: Never    Sexual activity: Not Currently     Partners: Male     Birth control/protection: Post-menopausal     Comment: Had tubes tied in 2003 and endometrial ablasion in 2007   Other Topics Concern    Not on file   Social History Narrative    Not on file     Social Drivers of Health     Financial Resource Strain: Low Risk  (3/20/2025)    Overall Financial Resource Strain (CARDIA)     Difficulty of Paying Living Expenses: Not hard at all   Food Insecurity: No Food Insecurity (3/20/2025)    Hunger Vital Sign     Worried About Running Out of Food in the Last Year: Never true     Ran Out of Food in the Last Year: Never true   Transportation Needs: No Transportation Needs (3/20/2025)    PRAPARE - Transportation     Lack of Transportation (Medical): No     Lack of Transportation (Non-Medical): No   Physical Activity: Insufficiently Active (3/20/2025)     Exercise Vital Sign     Days of Exercise per Week: 7 days     Minutes of Exercise per Session: 10 min   Stress: No Stress Concern Present (3/20/2025)    [employer] [place] [employer] - Occupational Stress Questionnaire     Feeling of Stress : Only a little   Recent Concern: Stress - Stress Concern Present (3/20/2025)    [employer] [place] [employer] - Occupational Stress Questionnaire     Feeling of Stress : To some extent   Social Connections: Unknown (3/20/2025)    Social Connection and Isolation Panel [NHANES]     Frequency of Communication with Friends and Family: More than three times a week     Frequency of Social Gatherings with Friends and Family: More than three times a week     Attends [employer] Services: 1 to 4 times per year     Active Member of Clubs or Organizations: Patient declined     Attends Club or Organization Meetings: Patient declined     Marital Status:    Recent Concern: Social Connections - Moderately Isolated (3/20/2025)    Social Connection and Isolation Panel [NHANES]     Frequency of Communication with Friends and Family: Never     Frequency of Social Gatherings with Friends and Family: Never     Attends Pent[employer] Services: More than 4 times per year     Active Member of Clubs or Organizations: No     Attends Club or Organization Meetings: Never     Marital Status:    Intimate Partner Violence: Not At Risk (3/20/2025)    Humiliation, Afraid, Rape, and Kick questionnaire     Fear of Current or Ex-Partner: No     Emotionally Abused: No     Physically Abused: No     Sexually Abused: No   Housing Stability: Low Risk  (3/20/2025)    Housing Stability Vital Sign     Unable to Pay for Housing in the Last Year: No     Number of Times Moved in the Last Year: 0     Homeless in the Last Year: No        RX Allergies[4]      Review of Systems  Gen: No fatigue, anorexia, insomnia, fever.   Eyes: No vision loss, double vision, drainage, eye pain.   ENT: No  pharyngitis, dry mouth, no hearing changes or ear discharge  Cardiac: No chest pain, palpitations, syncope, near syncope.   Pulmonary: No shortness of breath, cough, hemoptysis.   Heme/lymph: No swollen glands, fever, bleeding.   GI: No abdominal pain, change in bowel habits, melena, hematemesis, hematochezia, nausea, vomiting, diarrhea.   : No discharge, dysuria, frequency, urgency, hematuria.  Endo: No polyuria or weight loss.   Musculoskeletal: Negative for any pain or loss of ROM/weakness  Skin: No rashes or lesions  Neuro: Normal speech, no numbness or weakness. No gait difficulties  Review of systems is otherwise negative unless stated above or in history of present illness.    Physical Exam:  Constitutional:  no distress, alert and cooperative  Eyes: clear sclera  Head/Neck: No apparent injury, trachea midline  Respiratory/Thorax: Patent airways, thorax symmetric, breathing comfortably  Cardiovascular: no pitting edema  Gastrointestinal: Nondistended  Musculoskeletal: ROM intact  Extremities: no clubbing  Neurological: alert, vee x4  Psychological: Appropriate affect    No results found for this or any previous visit (from the past 24 hours).     Winter Feliz is a 54 y.o. year old female patient who presents for Procedure(s):  Radical resection of right retroperitoneal sarcoma with right nephrectomy with Enzo Villa MD MPH on 5/28/2025. Acute Pain consulted for assistance with pain control.     Plan:    - Bilateral erector spinae blocks with catheters performed pre-operatively on 5/28/2025  - Ambit ball with Ropivacaine 0.2%/NaCl 0.9% 500mL, Rate 7 cc/hr bilaterally  - Ambit medication will not interfere with pain medication prescribed by the primary team.   - Please be aware of local anesthetic toxic dose and absorption variability before considering lidocaine patches  - Acute pain service will follow while catheters in place  - Rest of pain management per primary team    Acute Pain Resident  pg  [phone]  [phone]        [1]   Past Medical History:  Diagnosis Date    Anxiety     Asthma     Body mass index (BMI) 33.0-33.9, adult 2022    BMI 33.0-33.9,adult    Cervical disc disease     Cyst of right kidney     resolved per last U/S    Depression     Disease of thyroid gland     Dyslipidemia     Gallstones     GERD (gastroesophageal reflux disease)     History of dysphagia     since thyroidectomy    History of recent steroid use     Hypertension     Hypothyroidism 2007    Irritable bowel syndrome     Liver lesion     IGYG (obstructive sleep apnea)     Pt has INSPIRE device    PONV (postoperative nausea and vomiting) 2007    Psoriasis     Psoriatic arthritis (Multi)     Retroperitoneal sarcoma (Multi)     Snoring     Spondyloarthritis     Thyroid cancer (Multi)    [2]   Past Surgical History:  Procedure Laterality Date     SECTION, CLASSIC  2016     Section     SECTION, LOW TRANSVERSE  , , ,     ENDOMETRIAL ABLATION      FOOT SURGERY      cyst removed from L  foot between 1st and 2nd MTP    HAND SURGERY Right     R  cyst removal    OTHER SURGICAL HISTORY      hypoglossal nerve stimulator- INSPIRE    TOTAL THYROIDECTOMY      first partial, then total   [3]   Family History  Problem Relation Name Age of Onset    Breast cancer Mother Stacey 59        metastatic    Cancer Mother Stacey     Alcohol abuse Father Sadiq Mccarty     No Known Problems Daughter      No Known Problems Son      Hypertension Maternal Grandmother Grandma     Glaucoma Maternal Grandmother Grandma     Heart disease Maternal Grandmother Grandma     Stroke Maternal Grandmother Grandma     No Known Problems Maternal Grandfather      No Known Problems Paternal Grandmother      No Known Problems Paternal Grandfather      No Known Problems Mother's Sister     [4]   Allergies  Allergen Reactions    Lipitor [Atorvastatin] Other     Muscle aches    Methotrexate GI Upset    Iodine Other      vomiting

## 2025-05-28 NOTE — ANESTHESIA PROCEDURE NOTES
Airway  Date/Time: 5/28/2025 1:01 PM  Reason: elective    Airway not difficult    Staffing  Performed: resident   Authorized by: Rashad Mckeon MD    Performed by: Kody Tinoco MD  Patient location during procedure: OR    Patient Condition  Indications for airway management: anesthesia  Patient position: sniffing  Planned trial extubation  Sedation level: deep     Final Airway Details   Preoxygenated: yes  Final airway type: endotracheal airway  Successful airway: ETT  Cuffed: yes   Successful intubation technique: video laryngoscopy  Adjuncts used in placement: intubating stylet  Endotracheal tube insertion site: oral  Blade: Adonis  Blade size: #3  ETT size (mm): 7.0  Cormack-Lehane Classification: grade I - full view of glottis  Placement verified by: chest auscultation and capnometry   Inital cuff pressure (cm H2O): 5  Cuff volume (mL): 10  Measured from: lips  ETT to lips (cm): 21  Number of attempts at approach: 1    Additional Comments  Atraumatic  Airway in preanesthetic condition  Easy mask  Easy airway

## 2025-05-29 PROBLEM — R11.2 PONV (POSTOPERATIVE NAUSEA AND VOMITING): Status: RESOLVED | Noted: 2025-05-28 | Resolved: 2025-05-29

## 2025-05-29 PROBLEM — Z98.890 PONV (POSTOPERATIVE NAUSEA AND VOMITING): Status: RESOLVED | Noted: 2025-05-28 | Resolved: 2025-05-29

## 2025-05-29 PROBLEM — C49.9 SARCOMA (MULTI): Status: RESOLVED | Noted: 2025-05-13 | Resolved: 2025-05-29

## 2025-05-29 LAB
ALBUMIN SERPL BCP-MCNC: 4.2 G/DL (ref 3.4–5)
ALP SERPL-CCNC: 44 U/L (ref 33–110)
ALT SERPL W P-5'-P-CCNC: 56 U/L (ref 7–45)
ANION GAP SERPL CALC-SCNC: 18 MMOL/L (ref 10–20)
AST SERPL W P-5'-P-CCNC: 94 U/L (ref 9–39)
BILIRUB SERPL-MCNC: 1.7 MG/DL (ref 0–1.2)
BUN SERPL-MCNC: 10 MG/DL (ref 6–23)
CALCIUM SERPL-MCNC: 8.6 MG/DL (ref 8.6–10.6)
CHLORIDE SERPL-SCNC: 102 MMOL/L (ref 98–107)
CO2 SERPL-SCNC: 23 MMOL/L (ref 21–32)
CREAT SERPL-MCNC: 1.34 MG/DL (ref 0.5–1.05)
EGFRCR SERPLBLD CKD-EPI 2021: 47 ML/MIN/1.73M*2
ERYTHROCYTE [DISTWIDTH] IN BLOOD BY AUTOMATED COUNT: 13.2 % (ref 11.5–14.5)
ERYTHROCYTE [DISTWIDTH] IN BLOOD BY AUTOMATED COUNT: 13.3 % (ref 11.5–14.5)
GLUCOSE SERPL-MCNC: 123 MG/DL (ref 74–99)
HCT VFR BLD AUTO: 28.5 % (ref 36–46)
HCT VFR BLD AUTO: 29.2 % (ref 36–46)
HGB BLD-MCNC: 9 G/DL (ref 12–16)
HGB BLD-MCNC: 9.6 G/DL (ref 12–16)
MAGNESIUM SERPL-MCNC: 1.89 MG/DL (ref 1.6–2.4)
MCH RBC QN AUTO: 27.2 PG (ref 26–34)
MCH RBC QN AUTO: 27.4 PG (ref 26–34)
MCHC RBC AUTO-ENTMCNC: 31.6 G/DL (ref 32–36)
MCHC RBC AUTO-ENTMCNC: 32.9 G/DL (ref 32–36)
MCV RBC AUTO: 83 FL (ref 80–100)
MCV RBC AUTO: 86 FL (ref 80–100)
NRBC BLD-RTO: 0 /100 WBCS (ref 0–0)
NRBC BLD-RTO: 0 /100 WBCS (ref 0–0)
PHOSPHATE SERPL-MCNC: 4.2 MG/DL (ref 2.5–4.9)
PLATELET # BLD AUTO: 277 X10*3/UL (ref 150–450)
PLATELET # BLD AUTO: 284 X10*3/UL (ref 150–450)
POTASSIUM SERPL-SCNC: 3.7 MMOL/L (ref 3.5–5.3)
PROT SERPL-MCNC: 6.2 G/DL (ref 6.4–8.2)
RBC # BLD AUTO: 3.31 X10*6/UL (ref 4–5.2)
RBC # BLD AUTO: 3.5 X10*6/UL (ref 4–5.2)
SODIUM SERPL-SCNC: 139 MMOL/L (ref 136–145)
WBC # BLD AUTO: 14.6 X10*3/UL (ref 4.4–11.3)
WBC # BLD AUTO: 15.5 X10*3/UL (ref 4.4–11.3)

## 2025-05-29 PROCEDURE — 51701 INSERT BLADDER CATHETER: CPT

## 2025-05-29 PROCEDURE — 36415 COLL VENOUS BLD VENIPUNCTURE: CPT | Performed by: NURSE PRACTITIONER

## 2025-05-29 PROCEDURE — 94640 AIRWAY INHALATION TREATMENT: CPT

## 2025-05-29 PROCEDURE — 85027 COMPLETE CBC AUTOMATED: CPT | Performed by: NURSE PRACTITIONER

## 2025-05-29 PROCEDURE — 2500000004 HC RX 250 GENERAL PHARMACY W/ HCPCS (ALT 636 FOR OP/ED)

## 2025-05-29 PROCEDURE — 36415 COLL VENOUS BLD VENIPUNCTURE: CPT

## 2025-05-29 PROCEDURE — 84100 ASSAY OF PHOSPHORUS: CPT

## 2025-05-29 PROCEDURE — 2500000004 HC RX 250 GENERAL PHARMACY W/ HCPCS (ALT 636 FOR OP/ED): Performed by: NURSE PRACTITIONER

## 2025-05-29 PROCEDURE — 99231 SBSQ HOSP IP/OBS SF/LOW 25: CPT

## 2025-05-29 PROCEDURE — 85027 COMPLETE CBC AUTOMATED: CPT

## 2025-05-29 PROCEDURE — 2500000001 HC RX 250 WO HCPCS SELF ADMINISTERED DRUGS (ALT 637 FOR MEDICARE OP)

## 2025-05-29 PROCEDURE — 80053 COMPREHEN METABOLIC PANEL: CPT

## 2025-05-29 PROCEDURE — 97161 PT EVAL LOW COMPLEX 20 MIN: CPT | Mod: GP

## 2025-05-29 PROCEDURE — 83735 ASSAY OF MAGNESIUM: CPT

## 2025-05-29 PROCEDURE — 2500000002 HC RX 250 W HCPCS SELF ADMINISTERED DRUGS (ALT 637 FOR MEDICARE OP, ALT 636 FOR OP/ED)

## 2025-05-29 PROCEDURE — 1170000001 HC PRIVATE ONCOLOGY ROOM DAILY

## 2025-05-29 RX ORDER — HEPARIN SODIUM 5000 [USP'U]/ML
5000 INJECTION, SOLUTION INTRAVENOUS; SUBCUTANEOUS EVERY 8 HOURS
Status: DISCONTINUED | OUTPATIENT
Start: 2025-05-29 | End: 2025-05-30

## 2025-05-29 RX ORDER — POLYETHYLENE GLYCOL 3350 17 G/17G
17 POWDER, FOR SOLUTION ORAL DAILY
COMMUNITY
Start: 2025-05-29 | End: 2025-06-11 | Stop reason: ALTCHOICE

## 2025-05-29 RX ORDER — MAGNESIUM SULFATE HEPTAHYDRATE 40 MG/ML
2 INJECTION, SOLUTION INTRAVENOUS ONCE
Status: COMPLETED | OUTPATIENT
Start: 2025-05-29 | End: 2025-05-29

## 2025-05-29 RX ORDER — AMOXICILLIN 250 MG
1 CAPSULE ORAL NIGHTLY
COMMUNITY
Start: 2025-05-29 | End: 2025-06-11 | Stop reason: ALTCHOICE

## 2025-05-29 RX ORDER — SODIUM CHLORIDE, SODIUM LACTATE, POTASSIUM CHLORIDE, CALCIUM CHLORIDE 600; 310; 30; 20 MG/100ML; MG/100ML; MG/100ML; MG/100ML
75 INJECTION, SOLUTION INTRAVENOUS CONTINUOUS
Status: DISCONTINUED | OUTPATIENT
Start: 2025-05-29 | End: 2025-05-29

## 2025-05-29 RX ORDER — TRAMADOL HYDROCHLORIDE 50 MG/1
50 TABLET, FILM COATED ORAL EVERY 6 HOURS PRN
Qty: 28 TABLET | Refills: 0 | Status: SHIPPED | OUTPATIENT
Start: 2025-05-29 | End: 2025-06-04

## 2025-05-29 RX ORDER — SODIUM CHLORIDE, SODIUM LACTATE, POTASSIUM CHLORIDE, CALCIUM CHLORIDE 600; 310; 30; 20 MG/100ML; MG/100ML; MG/100ML; MG/100ML
100 INJECTION, SOLUTION INTRAVENOUS CONTINUOUS
Status: DISCONTINUED | OUTPATIENT
Start: 2025-05-29 | End: 2025-05-30

## 2025-05-29 RX ORDER — ACETAMINOPHEN 325 MG/1
650 TABLET ORAL EVERY 6 HOURS
COMMUNITY
Start: 2025-05-29 | End: 2025-06-10 | Stop reason: WASHOUT

## 2025-05-29 RX ADMIN — SODIUM CHLORIDE, POTASSIUM CHLORIDE, SODIUM LACTATE AND CALCIUM CHLORIDE 500 ML: 600; 310; 30; 20 INJECTION, SOLUTION INTRAVENOUS at 15:39

## 2025-05-29 RX ADMIN — SENNOSIDES AND DOCUSATE SODIUM 1 TABLET: 50; 8.6 TABLET ORAL at 20:31

## 2025-05-29 RX ADMIN — CITALOPRAM HYDROBROMIDE 40 MG: 40 TABLET ORAL at 00:05

## 2025-05-29 RX ADMIN — HEPARIN SODIUM 5000 UNITS: 5000 INJECTION, SOLUTION INTRAVENOUS; SUBCUTANEOUS at 10:55

## 2025-05-29 RX ADMIN — FLUTICASONE FUROATE AND VILANTEROL TRIFENATATE 1 PUFF: 200; 25 POWDER RESPIRATORY (INHALATION) at 08:11

## 2025-05-29 RX ADMIN — HEPARIN SODIUM 5000 UNITS: 5000 INJECTION, SOLUTION INTRAVENOUS; SUBCUTANEOUS at 18:14

## 2025-05-29 RX ADMIN — PANTOPRAZOLE SODIUM 40 MG: 40 TABLET, DELAYED RELEASE ORAL at 20:31

## 2025-05-29 RX ADMIN — ACETAMINOPHEN 650 MG: 325 TABLET ORAL at 17:30

## 2025-05-29 RX ADMIN — BUPROPION HYDROCHLORIDE 150 MG: 150 TABLET, EXTENDED RELEASE ORAL at 10:55

## 2025-05-29 RX ADMIN — POTASSIUM CHLORIDE 10 MEQ: 750 TABLET, FILM COATED, EXTENDED RELEASE ORAL at 10:55

## 2025-05-29 RX ADMIN — ACETAMINOPHEN 650 MG: 325 TABLET ORAL at 22:01

## 2025-05-29 RX ADMIN — EZETIMIBE 10 MG: 10 TABLET ORAL at 20:31

## 2025-05-29 RX ADMIN — ACETAMINOPHEN 650 MG: 325 TABLET ORAL at 03:04

## 2025-05-29 RX ADMIN — SODIUM CHLORIDE, POTASSIUM CHLORIDE, SODIUM LACTATE AND CALCIUM CHLORIDE 75 ML/HR: 600; 310; 30; 20 INJECTION, SOLUTION INTRAVENOUS at 09:00

## 2025-05-29 RX ADMIN — ACETAMINOPHEN 650 MG: 325 TABLET ORAL at 11:17

## 2025-05-29 RX ADMIN — PANTOPRAZOLE SODIUM 40 MG: 40 TABLET, DELAYED RELEASE ORAL at 10:55

## 2025-05-29 RX ADMIN — SODIUM CHLORIDE, POTASSIUM CHLORIDE, SODIUM LACTATE AND CALCIUM CHLORIDE 500 ML: 600; 310; 30; 20 INJECTION, SOLUTION INTRAVENOUS at 18:14

## 2025-05-29 RX ADMIN — POLYETHYLENE GLYCOL 3350 17 G: 17 POWDER, FOR SOLUTION ORAL at 10:55

## 2025-05-29 RX ADMIN — MAGNESIUM SULFATE HEPTAHYDRATE 2 G: 40 INJECTION, SOLUTION INTRAVENOUS at 10:54

## 2025-05-29 RX ADMIN — FOLIC ACID 1 MG: 1 TABLET ORAL at 10:55

## 2025-05-29 RX ADMIN — LEVOTHYROXINE SODIUM 100 MCG: 100 TABLET ORAL at 06:04

## 2025-05-29 RX ADMIN — ROSUVASTATIN CALCIUM 40 MG: 40 TABLET, FILM COATED ORAL at 00:05

## 2025-05-29 ASSESSMENT — PAIN - FUNCTIONAL ASSESSMENT
PAIN_FUNCTIONAL_ASSESSMENT: 0-10

## 2025-05-29 ASSESSMENT — PAIN SCALES - GENERAL
PAINLEVEL_OUTOF10: 2
PAINLEVEL_OUTOF10: 3
PAINLEVEL_OUTOF10: 3

## 2025-05-29 ASSESSMENT — COGNITIVE AND FUNCTIONAL STATUS - GENERAL
TOILETING: A LITTLE
DAILY ACTIVITIY SCORE: 20
STANDING UP FROM CHAIR USING ARMS: A LITTLE
WALKING IN HOSPITAL ROOM: A LITTLE
MOVING FROM LYING ON BACK TO SITTING ON SIDE OF FLAT BED WITH BEDRAILS: A LITTLE
TURNING FROM BACK TO SIDE WHILE IN FLAT BAD: A LITTLE
CLIMB 3 TO 5 STEPS WITH RAILING: TOTAL
DRESSING REGULAR LOWER BODY CLOTHING: A LITTLE
CLIMB 3 TO 5 STEPS WITH RAILING: A LITTLE
TURNING FROM BACK TO SIDE WHILE IN FLAT BAD: A LITTLE
STANDING UP FROM CHAIR USING ARMS: A LITTLE
DAILY ACTIVITIY SCORE: 23
DRESSING REGULAR UPPER BODY CLOTHING: A LITTLE
DRESSING REGULAR LOWER BODY CLOTHING: A LITTLE
HELP NEEDED FOR BATHING: A LITTLE
MOVING TO AND FROM BED TO CHAIR: A LITTLE
MOBILITY SCORE: 16
MOBILITY SCORE: 19
WALKING IN HOSPITAL ROOM: A LITTLE
WALKING IN HOSPITAL ROOM: A LITTLE
MOVING TO AND FROM BED TO CHAIR: A LITTLE
MOBILITY SCORE: 22
CLIMB 3 TO 5 STEPS WITH RAILING: A LITTLE

## 2025-05-29 ASSESSMENT — ACTIVITIES OF DAILY LIVING (ADL): ADL_ASSISTANCE: INDEPENDENT

## 2025-05-29 NOTE — PROGRESS NOTES
05/29/25 1200   Discharge Planning   Living Arrangements Spouse/significant other   Support Systems Spouse/significant other;Children   Assistance Needed none   Type of Residence Private residence   Who is requesting discharge planning? Provider   Home or Post Acute Services Other (Comment)  (TBD)   Expected Discharge Disposition Home   Does the patient need discharge transport arranged? 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 ADOD 6/1, dispo TBD HNN vs low intensity.     Care Transitions Note 5/29 @ 12:45pm     Plan per Medical/Surgical Team: S/P sarcoma resection  Status: Inpatient   Payor Source: [place]   Discharge disposition: TBD; HNN vs low intensity pending PT recommendations  Expected date of discharge: 6/1/25  Barriers: none   PCP / Primary Oncologist: Star Null DO  Preferred Pharmacy: [facility]   Preferred home care agency: [facility]    Met with patient,  and daughter at bedside, introduced self and role. Demographics and insurance verifed with patient. Patient lives at home with her  and 3/5 children. No DME. Patient reports she has an inspire device in her chest (implantable CPAP device). Patient reports family will transport her home at discharge. Not active with home care on admission. PT/OT pending, as they were unable to work with patient this AM due to dizziness. Will continue to follow for any discharge planning needs. Pita Davis RN TCC

## 2025-05-29 NOTE — CONSULTS
"Nutrition Initial Assessment:   Nutrition Assessment    --->Reason for Assessment: Admission nursing screening    Patient is a 54 y.o. 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 Biopsy of mass c/w liposarcoma. Currently POD #1 from 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    Nutrition History:  This service met with pt this am, was sitting up in bed at time of visit with her.    Tells she has not eaten all that well x the last ~2.5 mos. Thinks it has been mostly d/t anxiety surrounding her diagnosis. Tries to eat during the day--typically 2 meals/day with a snack at night. Consuming smaller portions when compared to her baseline. Sometimes would only take a few bites of a meal and then she was finished. Not eating too much meat but would eat eggs. Has never used any oral nutrition supplements.    Has IBS, so has intermittent diarrhea. No trouble chewing. Seems to have issues swallowing pills sometimes but does not struggle with swallowing food/liquids.    Since admit, not feeling too hungry since surgery. Has been drinking water. Had some nausea last pm but now resolved this am. Denied any other GI issues today, so far, during visit.    --Vitamin/Herbal Supplement Use: MVI  --Food Allergy: pt denies       Anthropometrics:  Height: 162.6 cm (5' 4\")   Weight: 79.6 kg (175 lb 7.8 oz)   BMI (Calculated): 30.11  IBW/kg (Dietitian Calculated): 54.5 kg  Percent of IBW: 146 %    Weight History:   --pt thinks she has lost ~15 lb in the last 2-3 mos    Wt Readings per review of EMR:   05/28/25 79.6 kg (175 lb 7.8 oz) (current wt)   05/20/25 80.7 kg (178 lb)   05/13/25 79.6 kg (175 lb 6.4 oz)   04/10/25 80.3 kg (177 lb)   04/03/25 80.7 kg (178 lb)   03/21/25 76.2 kg (167 lb 15.9 oz)   03/13/25 83 kg (183 lb)--->4% wt loss from this date to present (not significant)   03/10/25 83 kg (183 lb)   11/15/24 84.1 kg (185 lb 6.4 oz)--->5% wt loss from this date to present (not " significant)   07/08/24 78.5 kg (173 lb)   06/14/24 79 kg (174 lb 3.2 oz)   05/07/24 79.7 kg (175 lb 12.8 oz)   03/08/24 78.3 kg (172 lb 9.6 oz)   12/08/23 81.4 kg (179 lb 6.4 oz)   12/08/23 80.4 kg (177 lb 4 oz)   12/06/23 80.3 kg (177 lb)   06/12/23 79.3 kg (174 lb 13.2 oz)   06/01/23 80.3 kg (177 lb)   05/23/23 78 kg (172 lb)   05/02/23 80.6 kg (177 lb 12.8 oz)   04/14/23 80.5 kg (177 lb 6 oz)   12/12/22 79.8 kg (175 lb 14.8 oz)   12/09/22 79.9 kg (176 lb 4 oz)   10/04/22 83.5 kg (184 lb)   09/12/22 82.1 kg (180 lb 14.4 oz)     Nutrition Focused Physical Exam Findings:  Subcutaneous Fat Loss:   Orbital Fat Pads: Well nourished (slightly bulging fat pads)  Buccal Fat Pads: Well nourished (full, rounded cheeks)  Triceps: Well nourished (ample fat tissue)  Ribs: Defer  Muscle Wasting:  Temporalis: Well nourished (well-defined muscle)  Pectoralis (Clavicular Region): Well nourished (clavicle not visible)  Deltoid/Trapezius: Well nourished (rounded appearance at arm, shoulder, neck)  Interosseous: Well nourished (muscle bulges)  Trapezius/Infraspinatus/Supraspinatus (Scapular Region): Defer  Quadriceps: Defer  Gastrocnemius: Defer  Edema:  Edema: none  Physical Findings:  Hair: Negative  Eyes: Negative  Nails: Negative  Skin: Positive (surgical incisions to abdomen)    Nutrition Significant Labs:  CBC Trend:   Results from last 7 days   Lab Units 05/29/25  0517   WBC AUTO x10*3/uL 15.5*   RBC AUTO x10*6/uL 3.50*   HEMOGLOBIN g/dL 9.6*   HEMATOCRIT % 29.2*   MCV fL 83   PLATELETS AUTO x10*3/uL 284   BMP Trend:   Results from last 7 days   Lab Units 05/29/25  0517   GLUCOSE mg/dL 123*   CALCIUM mg/dL 8.6   SODIUM mmol/L 139   POTASSIUM mmol/L 3.7   CO2 mmol/L 23   CHLORIDE mmol/L 102   BUN mg/dL 10   CREATININE mg/dL 1.34*   A1C:  Lab Results   Component Value Date    HGBA1C 5.9 06/16/2021   Liver Function Trend:   Results from last 7 days   Lab Units 05/29/25  0517   ALK PHOS U/L 44   AST U/L 94*   ALT U/L 56*    BILIRUBIN TOTAL mg/dL 1.7*   Renal Lab Trend:   Results from last 7 days   Lab Units 05/29/25  0517   POTASSIUM mmol/L 3.7   PHOSPHORUS mg/dL 4.2   SODIUM mmol/L 139   MAGNESIUM mg/dL 1.89   EGFR mL/min/1.73m*2 47*   BUN mg/dL 10   CREATININE mg/dL 1.34*   Vit D:   Lab Results   Component Value Date    VITD25 55 02/05/2025     Medications:  Scheduled medications  Scheduled Medications[1]  Continuous medications  Continuous Medications[2]  PRN medications  PRN Medications[3]    I/O:   --no documented BM yet, since admit    Dietary Orders (From admission, onward)       Start     Ordered    05/29/25 0625  Adult diet Clear Liquid  Diet effective now        Question:  Diet type  Answer:  Clear Liquid    05/29/25 0625 05/28/25 2145  May Participate in Room Service  ( ROOM SERVICE MAY PARTICIPATE)  Once        Question:  .  Answer:  Yes    05/28/25 2144                Estimated Needs:   Total Energy Estimated Needs in 24 hours (kCal): ~6492-8155  Method for Estimating Needs: MSJ (1375) x ~1.2-1.3  Total Protein Estimated Needs in 24 Hours (g): ~70+  Method for Estimating 24 Hour Protein Needs: 55 (IBW) x ~1.3g/kg+  Total Fluid Estimated Needs in 24 Hours (mL): 1ml/kcal or per MD/team        Nutrition Diagnosis   Malnutrition Diagnosis  Patient has Malnutrition Diagnosis: No    Nutrition Diagnosis  Patient has Nutrition Diagnosis: Yes  Diagnosis Status (1): New  Nutrition Diagnosis 1: Inadequate oral intake  Related to (1): decreased PO with new CA  As Evidenced by (1): pt reports of poor PO x ~2.5 mos, consuming smaller amounts of food when compared to her baseline, need for oral nutrition supplements  Additional Assessment Information (1): Is at risk for malnutrition, pending ongoing PO and weights. Considering decreased PO, do feel pt would benefit from use of oral nutrition supplements in-house. Discussed with her and agreeable to Ensure Plus, as diet advanced.       Nutrition Interventions/Recommendations    Nutrition prescription for oral nutrition    Nutrition Recommendations:  1. PO diet advancement, to goal of Regular, only as tolerated/as medically appropriate  --When diet advanced past Clear Liquids, please place order for Ensure Plus BID    2. Please weigh pt at least once/week (standing preferred, if feasible)    Nutrition Interventions/Goals:   Meals and Snacks: General healthful diet  Goal: goal of Regular Diet  Medical Food Supplement: Commercial beverage medical food supplement therapy  Goal: Ensure Plus BID (350 kcals, 13g pro each) (as diet advanced past Clear Liquids)    Education:  Education Documentation  No documentation found.    Verbally discussed role of oral nutrition supplements in the daily diet and reviewed types available at this facility, when diet advanced. Pt denied any questions for RDN at this time.       Nutrition Monitoring and Evaluation   Food/Nutrient Related History Monitoring  Monitoring and Evaluation Plan: Intake / amount of food  Intake / Amount of food: Consumes at least 50% or more of meals/snacks/supplements (as diet advanced past Clear Liquids)    Anthropometric Measurements  Monitoring and Evaluation Plan: Body weight  Body Weight: Body weight - Maintain stable weight    Biochemical Data, Medical Tests and Procedures  Monitoring and Evaluation Plan: Electrolyte/renal panel, Glucose/endocrine profile  Electrolyte and Renal Panel: Electrolytes within normal limits  Glucose/Endocrine Profile: Glucose within normal limits ( mg/dL)      Goal Status: New goal(s) identified          Time Spent (min): 45 minutes            [1] acetaminophen, 650 mg, oral, q6h  [Held by provider] amLODIPine, 5 mg, oral, Daily  buPROPion XL, 150 mg, oral, Daily  [Held by provider] chlorthalidone, 25 mg, oral, Daily  citalopram, 40 mg, oral, Daily  [START ON 6/1/2025] ergocalciferol, 1.25 mg, oral, Every Sunday  ezetimibe, 10 mg, oral, Daily  fluticasone furoate-vilanteroL, 1 puff, inhalation,  Daily  folic acid, 1 mg, oral, Daily  heparin, 5,000 Units, subcutaneous, TID  levothyroxine, 100 mcg, oral, Daily before breakfast  magnesium sulfate, 2 g, intravenous, Once  pantoprazole, 40 mg, oral, BID  polyethylene glycol, 17 g, oral, Daily  potassium chloride CR, 10 mEq, oral, Daily  rosuvastatin, 40 mg, oral, Daily  sennosides-docusate sodium, 1 tablet, oral, Nightly     [2] HYDROmorphone,   lactated Ringer's, 100 mL/hr, Last Rate: 100 mL/hr (05/28/25 2523)  lactated Ringer's, 75 mL/hr  ropivacaine (PF) in NS cmpd, 14 mL/hr, Last Rate: 14 mL/hr (05/28/25 3607)     [3] PRN medications: albuterol, naloxone, ondansetron ODT **OR** ondansetron

## 2025-05-29 NOTE — SIGNIFICANT EVENT
S:    POD 0 from Radical resection of right retroperitoneal sarcoma with right nephrectomy (Right).     Doing well. Pain controlled. Resting comfortably. No nausea/vomiting/CP/SOB. Endorses lightheadedness.     O:   Vital signs are stable, normotensive, afebrile, no new or worsening oxygen requirement, not tachycardic    Visit Vitals  /68 (BP Location: Right arm, Patient Position: Lying)   Pulse 69   Temp 36 °C (96.8 °F) (Temporal)   Resp 16        Constitutional: no acute distress  Skin: warm and dry overall   Neuro: A/O x4, no gross deficits   HEENT: Atraumatic, no scleral icterus  Cardiac: RRR  Pulmonary: Unlabored respirations on 3L NC    Abdomen: Non distended, non tender, soft  : Melendez in place CYU   Surgical Site: Dressing clean dry and intact with minimal amounts of strikethrough, appropriately tender    A/P:  Overall, patient is doing well postoperatively with no acute concerns.  Will continue to optimize pain control as needed.  Will continue to monitor clinical exam, vitals, I&O's, and labs when available.  Will follow up on the patient in the a.m. or sooner as needed.    Galen Ramirez MD  PGY-1 General Surgery  Colorectal Surgery [id number]

## 2025-05-29 NOTE — OP NOTE
Radical resection of right retroperitoneal sarcoma with right nephrectomy (R) Operative Note     Date: 2025  OR Location: [facility] OR    Name: Winter Feliz YOB: 1970, Age: 54 y.o., MRN: [id number], Sex: female    Diagnosis  Pre-op Diagnosis      * Sarcoma (Multi) [C49.9] Post-op Diagnosis     * Sarcoma (Multi) [C49.9]     Procedures  Radical resection of right retroperitoneal sarcoma with right nephrectomy  95479 - HI OPEN EXC/DSTRJ INTRA-ABDL TUMOR/CST 20.1-30 CM    Radical resection of right retroperitoneal sarcoma with right nephrectomy  31004 - HI NEPHRECTOMY W/PRTL URETERECT OPEN RIB RESCJ RAD      Surgeons      * Enzo Villa - Primary    Resident/Fellow/Other Assistant:  Surgeons and Role:     * Tres Tolbert MD - Assisting     * Bety Honeycutt MD - Assisting    Staff:   Relief Circulator: James  Scrub Person: Jerardo  Circulator: Trey  Relief Scrub: James  Relief Scrub: Maria T    Anesthesia Staff: Anesthesiologist: Rashad Mckeon MD; Sammy Gold MD; Masood Powell MD  Anesthesia Resident: Jeb Rangel DO; Kody Tinoco MD; Mahad Limon MD  Frontline Breaker: MERY Shields-CRNA    Procedure Summary  Anesthesia: General  ASA: III  Estimated Blood Loss: 400mL  Intra-op Medications:   Administrations occurring from 1200 to 1600 on 25:   Medication Name Total Dose   sodium chloride 0.9 % irrigation solution 3,000 mL   heparin (porcine) injection 5,000 Units 5,000 Units   albumin human bottle 5% 500 mL   aprepitant (Emend) capsule 40 mg   ceFAZolin (Ancef) vial 1 g 2 g   dexAMETHasone (Decadron) 4 mg/mL IV Syringe 2 mL 8 mg   ePHEDrine injection 35 mg   fentaNYL (Sublimaze) injection 50 mcg/mL 50 mcg   LR bolus Cannot be calculated   lidocaine (Xylocaine) injection 2 % 50 mg   metroNIDAZOLE (Flagyl)  mg/100 mL (premix) 500 mg   midazolam PF (Versed) injection 1 mg/mL 2 mg   phenylephrine (Rey-Synephrine) 10 mg in sodium chloride 0.9% 250 mL (0.04  mg/mL) infusion (premix) 0.55 mg   phenylephrine 40 mcg/mL syringe 10 mL 720 mcg   potassium chloride 20 mEq in 100 mL IV premix 20 mEq   propofol (Diprivan) injection 10 mg/mL 581.02 mg   rocuronium (ZeMuron) 50 mg/5 mL injection 100 mg   scopolamine (Transderm-Scop) 1 mg/3 days patch 1 patch              Anesthesia Record               Intraprocedure I/O Totals          Intake    LR bolus 1800.00 mL    Phenylephrine Drip 0.00 mL    The total shown is the total volume documented since Anesthesia Start was filed.    albumin human bottle 5% 1000.00 mL    Total Intake 2800 mL       Output    Urine 465 mL    Est. Blood Loss 400 mL    Total Output 865 mL       Net    Net Volume 1935 mL          Specimen:   ID Type Source Tests Collected by Time   1 : RIGHT RETROPERITONEAL LIPOSARCOMA WITH RIGHT KIDNEY AND RIGHT URETER Tissue SOFT TISSUE RESECTION SURGICAL PATHOLOGY EXAM Enzo Villa MD [facility] 5/28/2025 1725   A : RIGHT URETERAL STENT Tissue URETER RESECTION RIGHT AFB CULTURE/SMEAR, FUNGAL CULTURE/SMEAR, TISSUE/WOUND CULTURE/SMEAR Enzo Villa MD [facility] 5/28/2025 1821                 Drains and/or Catheters:   Urethral Catheter Non-latex 14 Fr. (Active)   Site Assessment Clean;Skin intact 05/29/25 0447   Collection Container Standard drainage bag 05/28/25 2109   Securement Method Securing device (Describe) 05/28/25 2109   Reason for Continuing Urinary Catheterization surgical procedures: urological/gynecological, pelvic oncology, anal, prolonged surgical procedure 05/28/25 2109   Output (mL) 150 mL 05/29/25 0308           Findings: Extensive perinephric scarring in the region of the duodenum, misha hepatis, and IVC. 25 x 22 x 16cm right retroperitoneal mass.     Indications: Winter Feliz is an 54 y.o. female who is having surgery for Sarcoma (Multi) [C49.9]. This is a 54 y.o. female who presents with a right retroperitoneal liposarcoma in the setting of ureteral obstruction and inflammatory changes as seen on the CT  scan.  The kidney is decompressed at this time with a right ureteral stent. She was recommended for radical resection of the right retroperitoneal liposarcoma with nephrectomy at the time.  Her kidney function is normal currently.  Based on the imaging I do not think this kidney could be preserved nor is it audiologically sound to leave it in place with this diagnosis.  She understands that as well as the risks, benefits, and alternatives.       The patient was seen in the preoperative area. The risks, benefits, complications, treatment options, non-operative alternatives, expected recovery and outcomes were discussed with the patient. The possibilities of reaction to medication, pulmonary aspiration, injury to surrounding structures, bleeding, recurrent infection, the need for additional procedures, failure to diagnose a condition, and creating a complication requiring transfusion or operation were discussed with the patient. The patient concurred with the proposed plan, giving informed consent.  The site of surgery was properly noted/marked if necessary per policy. The patient has been actively warmed in preoperative area. Preoperative antibiotics have been ordered and given within 1 hours of incision. Venous thrombosis prophylaxis have been ordered including bilateral sequential compression devices and chemical prophylaxis    Procedure Details: The patient was brought to the OR and placed on the OR table in supine position. General anesthesia was induced and an ET tube was placed without difficulty. The patient was positioned supine and the surgical sites were prepped and draped in sterile fashion.  A timeout was performed prior to the operation confirming the patient, procedure, anesthesia plan, antibiotic use, anticoagulation, and availability of imaging.  A Melendez catheter and an OG tube were also placed.    A midline laparotomy incision was made sharply and dissection carried through the underlying  subcutaneous tissues using electrocautery.  The fascia was incised and the peritoneal cavity was entered sharply noting no injury to underlying structures.  The abdomen was then inspected noting no evidence of sarcomatosis.  In the right retroperitoneum there was a large bulging mass pushing the right colon anteriorly.  The right colon was then assessed and the cecum was noted to be somewhat mobile while the hepatic flexure was fixed in the right upper quadrant.  Dense Adhesions were taken down from the edge of the liver and the duodenum was then traced from the antrum of the stomach.  Dense adhesions were noted from the duodenum to the right upper quadrant retroperitoneal mass.  These were thick and required significant amount of time for lysis of these adhesions while preserving the duodenum intact. These inflammatory changes affected the dissection of the entire perinephric region. This process continued as we eventually were able to perform a Kocher maneuver reflecting the duodenum away from the right retroperitoneal structures.  The IVC was noted inferior to this.  The colon was also reflected off of the right retroperitoneal mass by taking down the white line of Toldt.  This allowed us to then medialized the colon and evaluate the midline vascular structures including the IVC which was noted to also have a significant amount of scar from the right retroperitoneal mass.  The kidney was engulfed in all of this.  Care was taken to dissect then the right lateral aspect of the retroperitoneal fat off of the abdominal wall and diaphragm.  This was performed as extensively as possible with the limited visualization in the posterior aspect.  Superiorly, the mass was also encased in the inflammatory changes up to the level of the right adrenal gland.  A portion of the adrenal gland edge was taken during dissection of this fatty component.  Once this was mobilized we turned our attention to the inferior aspect of the  tumor which extended adjacent to the iliac vessels and along the psoas muscle the ureter was dissected and identified at the level of the iliac artery.  This was encircled with a vessel loop and then incised allowing us to remove the double-J stent.  The ureter was ligated with a 2-0 silk suture and a large clip was placed at the distal end.  This was ligated proximally as well at this time.  Ongoing mobilization of the mass off of the psoas allowed us to remove the known firm well-differentiated liposarcoma mass off of the psoas en bloc with the psoas fascia.  Dissection then carried up to the level of the renal hilar vessels.  The right renal vein was dissected free and ligated using a vascular load stapler.  The artery was then taken in similar fashion.  During this dissection a branch off of the IVC was partially injured and required suture ligation.  This was able to be performed with minimal blood loss.  The repair of the IVC occurred with a 4-0 Prolene suture.  At this point the remainder of the attachments to the retroperitoneum were resected using LigaSure cautery.  The mass was extirpated and measured 25 x 22 x 16 cm.  The retroperitoneum was irrigated and hemostasis was assured.  Decision was made not to leave a drain.  The bowel was then run and placed into anatomic fashion with the cecum in the right lower quadrant, the hepatic flexure of the colon and the right upper quadrant and the small bowel medially.  The omentum was pulled down over the intestines and the abdominal wall was then closed with a #1 looped PDS suture in running fashion.  Skin was closed with staples.  An island dressing was placed over top.    At the end of the case, all needle sponge and instrument counts were correct.    Evidence of Infection: No   Complications:  None; patient tolerated the procedure well.    Disposition: PACU - hemodynamically stable.  Condition: stable         Attending Attestation: I was present for the entire  procedure.    Enzo Villa  Phone Number: [phone]

## 2025-05-29 NOTE — PROGRESS NOTES
Postop Pain HPI -   Palliative: relieved with IV analgesics and regional local anesthetics  Provocative: movement  Quality:  burning and aching  Radiation:  none  Severity:  2/10  Timing: constant    24-HOUR OPIOID CONSUMPTION:  Tylenol 650 x2  Dilaudid PCA    Scheduled medications  Scheduled Medications[1]  Continuous medications  Continuous Medications[2]  PRN medications  PRN Medications[3]     Physical Exam:  Constitutional:  no distress, alert and cooperative  Eyes: clear sclera  Head/Neck: No apparent injury, trachea midline  Respiratory/Thorax: Patent airways, thorax symmetric, breathing comfortably  Cardiovascular: no pitting edema  Gastrointestinal: Nondistended  Musculoskeletal: ROM intact  Extremities: no clubbing  Neurological: alert, vee x4  Psychological: Appropriate affect    Results for orders placed or performed during the hospital encounter of 05/28/25 (from the past 24 hours)   Research collection:  1.2mL Lavendar EDTA - Clinic Colect   Result Value Ref Range    Extra Tube Hold for add-ons.    Verify ABO/Rh Group Test (VERAB)   Result Value Ref Range    ABO TYPE A     Rh TYPE POS    Prepare RBC: 4 Units   Result Value Ref Range    PRODUCT CODE I9403U14     Unit Number [id number]     Unit ABO A     Unit RH POS     XM INTEP COMP     Dispense Status XM     Blood Expiration Date 6/5/2025 11:59:00 PM EDT     PRODUCT BLOOD TYPE 6200     UNIT VOLUME 350     PRODUCT CODE C0946M92     Unit Number [id number]     Unit ABO A     Unit RH POS     XM INTEP COMP     Dispense Status XM     Blood Expiration Date 6/6/2025 11:59:00 PM EDT     PRODUCT BLOOD TYPE 6200     UNIT VOLUME 350     PRODUCT CODE S8105K18     Unit Number [id number]     Unit ABO A     Unit RH POS     XM INTEP COMP     Dispense Status XM     Blood Expiration Date 6/9/2025 11:59:00 PM EDT     PRODUCT BLOOD TYPE 6200     UNIT VOLUME 350     PRODUCT CODE W8625L56     Unit Number [id number]     Unit ABO A     Unit RH POS     XM  INTEP COMP     Dispense Status XM     Blood Expiration Date 6/10/2025 11:59:00 PM EDT     PRODUCT BLOOD TYPE 6200     UNIT VOLUME 350    Blood Gas Arterial Full Panel   Result Value Ref Range    POCT pH, Arterial 7.34 (L) 7.38 - 7.42 pH    POCT pCO2, Arterial 37 (L) 38 - 42 mm Hg    POCT pO2, Arterial 102 (H) 85 - 95 mm Hg    POCT SO2, Arterial 98 94 - 100 %    POCT Oxy Hemoglobin, Arterial 95.9 94.0 - 98.0 %    POCT Hematocrit Calculated, Arterial 32.0 (L) 36.0 - 46.0 %    POCT Sodium, Arterial 138 136 - 145 mmol/L    POCT Potassium, Arterial 2.0 (LL) 3.5 - 5.3 mmol/L    POCT Chloride, Arterial 106 98 - 107 mmol/L    POCT Ionized Calcium, Arterial 1.02 (L) 1.10 - 1.33 mmol/L    POCT Glucose, Arterial 156 (H) 74 - 99 mg/dL    POCT Lactate, Arterial 2.1 (H) 0.4 - 2.0 mmol/L    POCT Base Excess, Arterial -5.3 (L) -2.0 - 3.0 mmol/L    POCT HCO3 Calculated, Arterial 20.0 (L) 22.0 - 26.0 mmol/L    POCT Hemoglobin, Arterial 10.7 (L) 12.0 - 16.0 g/dL    POCT Anion Gap, Arterial 14 10 - 25 mmo/L    Patient Temperature 37.0 degrees Celsius    FiO2 50 %   Blood Gas Arterial Full Panel   Result Value Ref Range    POCT pH, Arterial 7.32 (L) 7.38 - 7.42 pH    POCT pCO2, Arterial 38 38 - 42 mm Hg    POCT pO2, Arterial 124 (H) 85 - 95 mm Hg    POCT SO2, Arterial 99 94 - 100 %    POCT Oxy Hemoglobin, Arterial 97.4 94.0 - 98.0 %    POCT Hematocrit Calculated, Arterial 44.0 36.0 - 46.0 %    POCT Sodium, Arterial 138 136 - 145 mmol/L    POCT Potassium, Arterial 2.2 (LL) 3.5 - 5.3 mmol/L    POCT Chloride, Arterial 106 98 - 107 mmol/L    POCT Ionized Calcium, Arterial 1.05 (L) 1.10 - 1.33 mmol/L    POCT Glucose, Arterial 171 (H) 74 - 99 mg/dL    POCT Lactate, Arterial 2.4 (H) 0.4 - 2.0 mmol/L    POCT Base Excess, Arterial -6.0 (L) -2.0 - 3.0 mmol/L    POCT HCO3 Calculated, Arterial 19.6 (L) 22.0 - 26.0 mmol/L    POCT Hemoglobin, Arterial 14.6 12.0 - 16.0 g/dL    POCT Anion Gap, Arterial 15 10 - 25 mmo/L    Patient Temperature 37.0  degrees Celsius    FiO2 50 %   Blood Gas Arterial Full Panel   Result Value Ref Range    POCT pH, Arterial 7.31 (L) 7.38 - 7.42 pH    POCT pCO2, Arterial 39 38 - 42 mm Hg    POCT pO2, Arterial 118 (H) 85 - 95 mm Hg    POCT SO2, Arterial 99 94 - 100 %    POCT Oxy Hemoglobin, Arterial 96.8 94.0 - 98.0 %    POCT Hematocrit Calculated, Arterial 30.0 (L) 36.0 - 46.0 %    POCT Sodium, Arterial 139 136 - 145 mmol/L    POCT Potassium, Arterial 2.7 (LL) 3.5 - 5.3 mmol/L    POCT Chloride, Arterial 107 98 - 107 mmol/L    POCT Ionized Calcium, Arterial 1.06 (L) 1.10 - 1.33 mmol/L    POCT Glucose, Arterial 158 (H) 74 - 99 mg/dL    POCT Lactate, Arterial 3.0 (H) 0.4 - 2.0 mmol/L    POCT Base Excess, Arterial -6.2 (L) -2.0 - 3.0 mmol/L    POCT HCO3 Calculated, Arterial 19.6 (L) 22.0 - 26.0 mmol/L    POCT Hemoglobin, Arterial 9.9 (L) 12.0 - 16.0 g/dL    POCT Anion Gap, Arterial 15 10 - 25 mmo/L    Patient Temperature 37.0 degrees Celsius    FiO2 50 %   Blood Gas Arterial Full Panel Unsolicited   Result Value Ref Range    POCT pH, Arterial 7.38 7.38 - 7.42 pH    POCT pCO2, Arterial 34 (L) 38 - 42 mm Hg    POCT pO2, Arterial 83 (L) 85 - 95 mm Hg    POCT SO2, Arterial 98 94 - 100 %    POCT Oxy Hemoglobin, Arterial 96.0 94.0 - 98.0 %    POCT Hematocrit Calculated, Arterial 33.0 (L) 36.0 - 46.0 %    POCT Sodium, Arterial 138 136 - 145 mmol/L    POCT Potassium, Arterial 3.0 (L) 3.5 - 5.3 mmol/L    POCT Chloride, Arterial 103 98 - 107 mmol/L    POCT Ionized Calcium, Arterial 1.19 1.10 - 1.33 mmol/L    POCT Glucose, Arterial 178 (H) 74 - 99 mg/dL    POCT Lactate, Arterial 3.8 (H) 0.4 - 2.0 mmol/L    POCT Base Excess, Arterial -4.4 (L) -2.0 - 3.0 mmol/L    POCT HCO3 Calculated, Arterial 20.1 (L) 22.0 - 26.0 mmol/L    POCT Hemoglobin, Arterial 11.1 (L) 12.0 - 16.0 g/dL    POCT Anion Gap, Arterial 18 10 - 25 mmo/L    Patient Temperature 37.0 degrees Celsius    FiO2 60 %   Coox Panel, Arterial Unsolicited   Result Value Ref Range    POCT  Hemoglobin, Arterial 11.1 (L) 12.0 - 16.0 g/dL    POCT Oxy Hemoglobin, Arterial 96.0 94.0 - 98.0 %    POCT Carboxyhemoglobin, Arterial 0.9 %    POCT Methemoglobin, Arterial 0.8 0.0 - 1.5 %    POCT Deoxy Hemoglobin, Arterial 2.4 0.0 - 5.0 %   CBC   Result Value Ref Range    WBC 15.5 (H) 4.4 - 11.3 x10*3/uL    nRBC 0.0 0.0 - 0.0 /100 WBCs    RBC 3.50 (L) 4.00 - 5.20 x10*6/uL    Hemoglobin 9.6 (L) 12.0 - 16.0 g/dL    Hematocrit 29.2 (L) 36.0 - 46.0 %    MCV 83 80 - 100 fL    MCH 27.4 26.0 - 34.0 pg    MCHC 32.9 32.0 - 36.0 g/dL    RDW 13.2 11.5 - 14.5 %    Platelets 284 150 - 450 x10*3/uL   Comprehensive metabolic panel   Result Value Ref Range    Glucose 123 (H) 74 - 99 mg/dL    Sodium 139 136 - 145 mmol/L    Potassium 3.7 3.5 - 5.3 mmol/L    Chloride 102 98 - 107 mmol/L    Bicarbonate 23 21 - 32 mmol/L    Anion Gap 18 10 - 20 mmol/L    Urea Nitrogen 10 6 - 23 mg/dL    Creatinine 1.34 (H) 0.50 - 1.05 mg/dL    eGFR 47 (L) >60 mL/min/1.73m*2    Calcium 8.6 8.6 - 10.6 mg/dL    Albumin 4.2 3.4 - 5.0 g/dL    Alkaline Phosphatase 44 33 - 110 U/L    Total Protein 6.2 (L) 6.4 - 8.2 g/dL    AST 94 (H) 9 - 39 U/L    Bilirubin, Total 1.7 (H) 0.0 - 1.2 mg/dL    ALT 56 (H) 7 - 45 U/L   Phosphorus   Result Value Ref Range    Phosphorus 4.2 2.5 - 4.9 mg/dL   Magnesium   Result Value Ref Range    Magnesium 1.89 1.60 - 2.40 mg/dL      - Bilateral erector spinae blocks with catheters performed pre-operatively on 05/28/2025  - Ambit ball with Ropivacaine 0.2%/NaCl 0.9% 500mL, Rate 7 cc/hr bilaterally, CONTINUE TODAY  - Ambit medication will not interfere with pain medication prescribed by the primary team.   - Please be aware of local anesthetic toxic dose and absorption variability before considering lidocaine patches  - Acute pain service will follow while catheters in place  - Rest of pain management per primary team    Isamar Clark DO         [1] acetaminophen, 650 mg, oral, q6h  [Held by provider] amLODIPine, 5 mg, oral,  Daily  buPROPion XL, 150 mg, oral, Daily  [Held by provider] chlorthalidone, 25 mg, oral, Daily  citalopram, 40 mg, oral, Daily  [START ON 6/1/2025] ergocalciferol, 1.25 mg, oral, Every Sunday  ezetimibe, 10 mg, oral, Daily  fluticasone furoate-vilanteroL, 1 puff, inhalation, Daily  folic acid, 1 mg, oral, Daily  heparin, 5,000 Units, subcutaneous, TID  levothyroxine, 100 mcg, oral, Daily before breakfast  magnesium sulfate, 2 g, intravenous, Once  pantoprazole, 40 mg, oral, BID  polyethylene glycol, 17 g, oral, Daily  potassium chloride CR, 10 mEq, oral, Daily  rosuvastatin, 40 mg, oral, Daily  sennosides-docusate sodium, 1 tablet, oral, Nightly  [2] HYDROmorphone,   lactated Ringer's, 100 mL/hr, Last Rate: 100 mL/hr (05/28/25 2716)  lactated Ringer's, 75 mL/hr  ropivacaine (PF) in NS cmpd, 14 mL/hr, Last Rate: 14 mL/hr (05/28/25 5377)  [3] PRN medications: albuterol, naloxone, ondansetron ODT **OR** ondansetron

## 2025-05-29 NOTE — HOSPITAL COURSE
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  Transferred to [facility] post-op.  Post-op course uncomplicated.  Melendez removed POD 1 and passed TOV.  Diet advanced slowly as tolerated.  IV medication transitioned to oral with adequate PO intake.  DC home POD #.

## 2025-05-29 NOTE — PROGRESS NOTES
[facility]  DEPARTMENT OF SURGERY    SURGICAL ONCOLOGY  PROGRESS NOTE    SUBJECTIVE  No acute events overnight. Pain mostly controlled. No nausea. Reports dizziness.    OBJECTIVE  Vitals:  Temp:  [35.9 °C (96.7 °F)-36.5 °C (97.7 °F)] 36.5 °C (97.7 °F)  Heart Rate:  [69-97] 97  Resp:  [10-17] 16  BP: ()/(52-81) 106/66  Arterial Line BP 1: ()/() 82/61    I/Os:  I/O last 3 completed shifts:  In: 3123.3 (39.2 mL/kg) [I.V.:323.3 (4.1 mL/kg); IV Piggyback:2800]  Out: 1840 (23.1 mL/kg) [Urine:1440 (0.5 mL/kg/hr); Blood:400]  Weight: 79.6 kg     Exam:  Gen:  NAD, non-toxic appearing  Eyes:  No scleral icterus  Card:  RRR  Resp:  Non-labored breathing on RA  Abd:  Soft, minimally distended, tender along midline incision (stapled, dressing c/d/i)  :  Smith with clear yellow urine  Ext:  WWP, LALA  Neuro:  AOx3, no gross neurological deficits    Labs:  CBC:   Lab Results   Component Value Date    WBC 15.5 (H) 05/29/2025    RBC 3.50 (L) 05/29/2025     BMP:   Lab Results   Component Value Date    GLUCOSE 123 (H) 05/29/2025    CO2 23 05/29/2025    BUN 10 05/29/2025    CREATININE 1.34 (H) 05/29/2025    CALCIUM 8.6 05/29/2025     Imaging:  None new      ASSESSMENT:  55 yo F with asthma, HLD, HTN, IGGY, 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    PLAN:  - continue PCA, tylenol  - home wellbutrin, citalopram  - incentive spirometry, home Breo-Ellipta  - home ezetimibe, crestor; continue holding amlodipine and chlorthalidone for now given blood pressures at this time  - advance to CLD  - maintain IVF 75 ml/hr  - discontinue smith catheter, f/u TOV  - avoid nephrotoxic medications  - home synthroid  - PT, OOB  - SCDs, SQH    D/w Dr. Daisy Tolbert MD  General Surgery PGY5  Surgical Oncology - Critical access hospital Service [id number]

## 2025-05-29 NOTE — PROGRESS NOTES
Physical Therapy    Physical Therapy Evaluation    Patient Name: Winter Feliz  MRN: [id number]  Department: [facility] Room: [address] [place] [address]  Today's Date: 5/29/2025   Time Calculation  Start Time: 0938  Stop Time: 1000  Time Calculation (min): 22 min    Assessment/Plan   PT Assessment  PT Assessment Results: Decreased strength, Decreased mobility, Impaired balance  Rehab Prognosis: Good  Barriers to Discharge Home: No anticipated barriers  End of Session Communication: Bedside nurse  Assessment Comment: Pt presents with decreased strength, balance, and mobility. Pt would benefit from PT to address the above deficits.  End of Session Patient Position: Bed, 3 rail up, Alarm on  IP OR SWING BED PT PLAN  Inpatient or Swing Bed: Inpatient  PT Plan  Treatment/Interventions: Bed mobility, Transfer training, Gait training, Stair training, Balance training, Strengthening  PT Plan: Ongoing PT  PT Frequency: 3 times per week  PT Discharge Recommendations: Other (comment) (unable to determine; pt limited by c/o dizziness; anticipate low intensity/no needs pending improvement in pain/dizziness)  Equipment Recommended upon Discharge:  (TBD)  PT Recommended Transfer Status: Assist x1, Assistive device  PT - OK to Discharge: Yes      Subjective   General Visit Information:  Reason for Referral: 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  Past Medical History Relevant to Rehab: asthma, HLD, HTN, IGGY, with R RP liposarcoma  Prior to Session Communication: Bedside nurse  Patient Position Received: Bed, 3 rail up, Alarm on   Home Living:  Home Living  Type of Home: House  Lives With: Spouse, Adult children  Home Adaptive Equipment: None  Home Layout: Two level, Laundry in basement, Able to live on main level with bedroom/bathroom (2 steps to kitchen)  Home Access: Stairs to enter with rails  Entrance Stairs-Number of Steps: 3  Prior Level of Function:  Prior Function Per Pt/Caregiver Report  ADL Assistance:  Independent  Homemaking Assistance: Independent  Ambulatory Assistance: Independent  Vocational: Full time employment  Precautions:  Precautions  Medical Precautions: Fall precautions  Post-Surgical Precautions: Abdominal surgery precautions  Vital Signs:  Vital Signs  BP:  (supine 101/59; supine after sitting /63)    Objective     Pain:  Pain Assessment  Pain Assessment: 0-10  0-10 (Numeric) Pain Score: 2  Pain Type: Surgical pain  Pain Location: Abdomen  Pain Interventions:  (PCA used appropriately)  Response to Interventions: Content/relaxed  Cognition:  Cognition  Overall Cognitive Status: Within Functional Limits  Orientation Level: Oriented X4      Extremity/Trunk Assessments:  Strength:     RUE   RUE : Exceptions to WFL  RUE AROM (degrees)  RUE AROM Comment: WFL  RUE Strength  RUE Overall Strength: Greater than or equal to 3/5 as evidenced by functional mobility  LUE   LUE: Exceptions to WFL  LUE AROM (degrees)  LUE AROM Comment: WFL  LUE Strength  LUE Overall Strength: Greater than or equal to 3/5 as evidenced by functional mobility  RLE   RLE : Exceptions to WFL  AROM RLE (degrees)  RLE AROM Comment: WFL  Strength RLE  RLE Overall Strength: Greater than or equal to 3/5 as evidenced by functional mobility  LLE   LLE : Exceptions to WFL  AROM LLE (degrees)  LLE AROM Comment: WFL  Strength LLE  LLE Overall Strength: Greater than or equal to 3/5 as evidenced by functional mobility    General Assessments:            Sensation  Light Touch: No apparent deficits     Static Sitting Balance  Static Sitting-Balance Support: Bilateral upper extremity supported  Static Sitting-Level of Assistance: Contact guard  Static Sitting-Comment/Number of Minutes: sat EOB x2 trials for ~1-2 minutes each; c/o dizziness and requested to return to supine  Dynamic Sitting Balance  Dynamic Sitting-Balance Support: Bilateral upper extremity supported  Dynamic Sitting-Level of Assistance: Minimum assistance       Functional  Assessments:  Bed Mobility  Bed Mobility: Yes  Bed Mobility 1  Bed Mobility 1: Supine to sitting, Sitting to supine  Level of Assistance 1: Minimum assistance  Bed Mobility Comments 1: x2 trials, verbal cues for logroll, use of bed rail  Transfers  Transfer: No (NT due to c/o dizziness)             Outcome Measures:  [facility] Basic Mobility  Turning from your back to your side while in a flat bed without using bedrails: A little  Moving from lying on your back to sitting on the side of a flat bed without using bedrails: A little  Moving to and from bed to chair (including a wheelchair): A little  Standing up from a chair using your arms (e.g. wheelchair or bedside chair): A little  To walk in hospital room: A little  Climbing 3-5 steps with railing: Total  Basic Mobility - Total Score: 16                               Encounter Problems       Encounter Problems (Active)       Balance       independent static/dynamic stance       Start:  05/29/25    Expected End:  06/19/25               Mobility       STG - Patient will ambulate 200 feet with LRD modified independent        Start:  05/29/25    Expected End:  06/19/25            STG - Patient will ascend and descend 3 stairs with LRD modified independent        Start:  05/29/25    Expected End:  06/19/25               PT Transfers       STG - Patient will perform bed mobility independent        Start:  05/29/25    Expected End:  06/19/25            STG - Patient will transfer sit to and from stand with LRD modified independent        Start:  05/29/25    Expected End:  06/19/25               Pain - Adult              Education Documentation  Precautions, taught by Svetlana Belle, PT at 5/29/2025 10:51 AM.  Learner: Patient  Readiness: Acceptance  Method: Explanation  Response: Verbalizes Understanding  Comment: safe mobility, abdominal precautions, fall precautions    Mobility Training, taught by Svetlana Belle, PT at 5/29/2025 10:51 AM.  Learner:  Patient  Readiness: Acceptance  Method: Explanation  Response: Verbalizes Understanding  Comment: safe mobility, abdominal precautions, fall precautions                Svetlana Belle, PT

## 2025-05-30 LAB
ACID FAST STN SPEC: NORMAL
ALBUMIN SERPL BCP-MCNC: 3.5 G/DL (ref 3.4–5)
ALBUMIN SERPL BCP-MCNC: 3.6 G/DL (ref 3.4–5)
ALP SERPL-CCNC: 47 U/L (ref 33–110)
ALT SERPL W P-5'-P-CCNC: 42 U/L (ref 7–45)
ANION GAP SERPL CALC-SCNC: 11 MMOL/L (ref 10–20)
ANION GAP SERPL CALC-SCNC: 12 MMOL/L (ref 10–20)
APPEARANCE UR: CLEAR
AST SERPL W P-5'-P-CCNC: 61 U/L (ref 9–39)
BACTERIA SPEC CULT: NORMAL
BILIRUB SERPL-MCNC: 1.1 MG/DL (ref 0–1.2)
BILIRUB UR STRIP.AUTO-MCNC: NEGATIVE MG/DL
BUN SERPL-MCNC: 11 MG/DL (ref 6–23)
BUN SERPL-MCNC: 9 MG/DL (ref 6–23)
CALCIUM SERPL-MCNC: 8.2 MG/DL (ref 8.6–10.6)
CALCIUM SERPL-MCNC: 8.3 MG/DL (ref 8.6–10.6)
CHLORIDE SERPL-SCNC: 103 MMOL/L (ref 98–107)
CHLORIDE SERPL-SCNC: 104 MMOL/L (ref 98–107)
CO2 SERPL-SCNC: 27 MMOL/L (ref 21–32)
CO2 SERPL-SCNC: 30 MMOL/L (ref 21–32)
COLOR UR: COLORLESS
CREAT SERPL-MCNC: 1.54 MG/DL (ref 0.5–1.05)
CREAT SERPL-MCNC: 1.58 MG/DL (ref 0.5–1.05)
EGFRCR SERPLBLD CKD-EPI 2021: 38 ML/MIN/1.73M*2
EGFRCR SERPLBLD CKD-EPI 2021: 39 ML/MIN/1.73M*2
ERYTHROCYTE [DISTWIDTH] IN BLOOD BY AUTOMATED COUNT: 13.3 % (ref 11.5–14.5)
FUNGUS SPEC CULT: NORMAL
FUNGUS SPEC FUNGUS STN: NORMAL
GLUCOSE SERPL-MCNC: 86 MG/DL (ref 74–99)
GLUCOSE SERPL-MCNC: 88 MG/DL (ref 74–99)
GLUCOSE UR STRIP.AUTO-MCNC: NORMAL MG/DL
GRAM STN SPEC: NORMAL
GRAM STN SPEC: NORMAL
HCT VFR BLD AUTO: 27.2 % (ref 36–46)
HGB BLD-MCNC: 8.6 G/DL (ref 12–16)
KETONES UR STRIP.AUTO-MCNC: NEGATIVE MG/DL
LEUKOCYTE ESTERASE UR QL STRIP.AUTO: ABNORMAL
MAGNESIUM SERPL-MCNC: 2.25 MG/DL (ref 1.6–2.4)
MCH RBC QN AUTO: 27 PG (ref 26–34)
MCHC RBC AUTO-ENTMCNC: 31.6 G/DL (ref 32–36)
MCV RBC AUTO: 85 FL (ref 80–100)
MUCOUS THREADS #/AREA URNS AUTO: NORMAL /LPF
MYCOBACTERIUM SPEC CULT: NORMAL
NITRITE UR QL STRIP.AUTO: NEGATIVE
NRBC BLD-RTO: 0 /100 WBCS (ref 0–0)
PH UR STRIP.AUTO: 6 [PH]
PHOSPHATE SERPL-MCNC: 1.9 MG/DL (ref 2.5–4.9)
PLATELET # BLD AUTO: 234 X10*3/UL (ref 150–450)
POTASSIUM SERPL-SCNC: 3.2 MMOL/L (ref 3.5–5.3)
POTASSIUM SERPL-SCNC: 3.3 MMOL/L (ref 3.5–5.3)
PROT SERPL-MCNC: 5.4 G/DL (ref 6.4–8.2)
PROT UR STRIP.AUTO-MCNC: NEGATIVE MG/DL
RBC # BLD AUTO: 3.19 X10*6/UL (ref 4–5.2)
RBC # UR STRIP.AUTO: NEGATIVE MG/DL
RBC #/AREA URNS AUTO: NORMAL /HPF
SODIUM SERPL-SCNC: 139 MMOL/L (ref 136–145)
SODIUM SERPL-SCNC: 142 MMOL/L (ref 136–145)
SP GR UR STRIP.AUTO: 1.01
UROBILINOGEN UR STRIP.AUTO-MCNC: NORMAL MG/DL
WBC # BLD AUTO: 11.7 X10*3/UL (ref 4.4–11.3)
WBC #/AREA URNS AUTO: NORMAL /HPF

## 2025-05-30 PROCEDURE — 2500000002 HC RX 250 W HCPCS SELF ADMINISTERED DRUGS (ALT 637 FOR MEDICARE OP, ALT 636 FOR OP/ED)

## 2025-05-30 PROCEDURE — 1170000001 HC PRIVATE ONCOLOGY ROOM DAILY

## 2025-05-30 PROCEDURE — 81001 URINALYSIS AUTO W/SCOPE: CPT | Performed by: NURSE PRACTITIONER

## 2025-05-30 PROCEDURE — 80053 COMPREHEN METABOLIC PANEL: CPT

## 2025-05-30 PROCEDURE — 2500000001 HC RX 250 WO HCPCS SELF ADMINISTERED DRUGS (ALT 637 FOR MEDICARE OP)

## 2025-05-30 PROCEDURE — 94640 AIRWAY INHALATION TREATMENT: CPT

## 2025-05-30 PROCEDURE — 36415 COLL VENOUS BLD VENIPUNCTURE: CPT

## 2025-05-30 PROCEDURE — 2500000001 HC RX 250 WO HCPCS SELF ADMINISTERED DRUGS (ALT 637 FOR MEDICARE OP): Performed by: NURSE PRACTITIONER

## 2025-05-30 PROCEDURE — 97530 THERAPEUTIC ACTIVITIES: CPT | Mod: GP

## 2025-05-30 PROCEDURE — 80069 RENAL FUNCTION PANEL: CPT | Mod: CCI

## 2025-05-30 PROCEDURE — 2500000004 HC RX 250 GENERAL PHARMACY W/ HCPCS (ALT 636 FOR OP/ED): Mod: JZ

## 2025-05-30 PROCEDURE — 99231 SBSQ HOSP IP/OBS SF/LOW 25: CPT

## 2025-05-30 PROCEDURE — 2500000005 HC RX 250 GENERAL PHARMACY W/O HCPCS

## 2025-05-30 PROCEDURE — 51701 INSERT BLADDER CATHETER: CPT

## 2025-05-30 PROCEDURE — 85027 COMPLETE CBC AUTOMATED: CPT

## 2025-05-30 PROCEDURE — 83735 ASSAY OF MAGNESIUM: CPT

## 2025-05-30 RX ORDER — SODIUM CHLORIDE, SODIUM LACTATE, POTASSIUM CHLORIDE, CALCIUM CHLORIDE 600; 310; 30; 20 MG/100ML; MG/100ML; MG/100ML; MG/100ML
50 INJECTION, SOLUTION INTRAVENOUS CONTINUOUS
Status: DISCONTINUED | OUTPATIENT
Start: 2025-05-30 | End: 2025-05-31

## 2025-05-30 RX ORDER — LIDOCAINE 560 MG/1
1 PATCH PERCUTANEOUS; TOPICAL; TRANSDERMAL DAILY
Status: DISCONTINUED | OUTPATIENT
Start: 2025-05-30 | End: 2025-06-01

## 2025-05-30 RX ORDER — POTASSIUM CHLORIDE 14.9 MG/ML
20 INJECTION INTRAVENOUS ONCE
Status: COMPLETED | OUTPATIENT
Start: 2025-05-30 | End: 2025-05-31

## 2025-05-30 RX ORDER — POTASSIUM CHLORIDE 1.5 G/1.58G
40 POWDER, FOR SOLUTION ORAL ONCE
Status: COMPLETED | OUTPATIENT
Start: 2025-05-30 | End: 2025-05-30

## 2025-05-30 RX ORDER — OXYCODONE HYDROCHLORIDE 5 MG/1
5 TABLET ORAL EVERY 4 HOURS PRN
Refills: 0 | Status: DISCONTINUED | OUTPATIENT
Start: 2025-05-30 | End: 2025-06-04 | Stop reason: HOSPADM

## 2025-05-30 RX ORDER — HEPARIN SODIUM 5000 [USP'U]/ML
5000 INJECTION, SOLUTION INTRAVENOUS; SUBCUTANEOUS EVERY 8 HOURS
Status: DISCONTINUED | OUTPATIENT
Start: 2025-05-30 | End: 2025-06-04

## 2025-05-30 RX ORDER — POTASSIUM CHLORIDE 14.9 MG/ML
20 INJECTION INTRAVENOUS
Status: DISCONTINUED | OUTPATIENT
Start: 2025-05-30 | End: 2025-05-30

## 2025-05-30 RX ORDER — TRAMADOL HYDROCHLORIDE 50 MG/1
50 TABLET, FILM COATED ORAL EVERY 6 HOURS PRN
Status: DISCONTINUED | OUTPATIENT
Start: 2025-05-30 | End: 2025-06-04 | Stop reason: HOSPADM

## 2025-05-30 RX ADMIN — ACETAMINOPHEN 650 MG: 325 TABLET ORAL at 10:45

## 2025-05-30 RX ADMIN — ACETAMINOPHEN 650 MG: 325 TABLET ORAL at 17:57

## 2025-05-30 RX ADMIN — BUPROPION HYDROCHLORIDE 150 MG: 150 TABLET, EXTENDED RELEASE ORAL at 08:29

## 2025-05-30 RX ADMIN — SENNOSIDES AND DOCUSATE SODIUM 1 TABLET: 50; 8.6 TABLET ORAL at 20:18

## 2025-05-30 RX ADMIN — PANTOPRAZOLE SODIUM 40 MG: 40 TABLET, DELAYED RELEASE ORAL at 08:29

## 2025-05-30 RX ADMIN — OXYCODONE HYDROCHLORIDE 5 MG: 5 TABLET ORAL at 15:00

## 2025-05-30 RX ADMIN — PANTOPRAZOLE SODIUM 40 MG: 40 TABLET, DELAYED RELEASE ORAL at 20:18

## 2025-05-30 RX ADMIN — ACETAMINOPHEN 650 MG: 325 TABLET ORAL at 04:10

## 2025-05-30 RX ADMIN — LIDOCAINE 1 PATCH: 4 PATCH TOPICAL at 20:18

## 2025-05-30 RX ADMIN — POTASSIUM CHLORIDE 40 MEQ: 1.5 POWDER, FOR SOLUTION ORAL at 12:17

## 2025-05-30 RX ADMIN — POTASSIUM CHLORIDE 20 MEQ: 14.9 INJECTION, SOLUTION INTRAVENOUS at 20:20

## 2025-05-30 RX ADMIN — EZETIMIBE 10 MG: 10 TABLET ORAL at 20:18

## 2025-05-30 RX ADMIN — ONDANSETRON 4 MG: 2 INJECTION INTRAMUSCULAR; INTRAVENOUS at 20:28

## 2025-05-30 RX ADMIN — TRAMADOL HYDROCHLORIDE 50 MG: 50 TABLET, COATED ORAL at 08:28

## 2025-05-30 RX ADMIN — SODIUM CHLORIDE, SODIUM LACTATE, POTASSIUM CHLORIDE, AND CALCIUM CHLORIDE 75 ML/HR: .6; .31; .03; .02 INJECTION, SOLUTION INTRAVENOUS at 12:18

## 2025-05-30 RX ADMIN — POTASSIUM CHLORIDE 10 MEQ: 750 TABLET, FILM COATED, EXTENDED RELEASE ORAL at 08:28

## 2025-05-30 RX ADMIN — TRAMADOL HYDROCHLORIDE 50 MG: 50 TABLET, COATED ORAL at 20:18

## 2025-05-30 RX ADMIN — FLUTICASONE FUROATE AND VILANTEROL TRIFENATATE 1 PUFF: 200; 25 POWDER RESPIRATORY (INHALATION) at 08:50

## 2025-05-30 RX ADMIN — ACETAMINOPHEN 650 MG: 325 TABLET ORAL at 23:18

## 2025-05-30 RX ADMIN — LEVOTHYROXINE SODIUM 100 MCG: 100 TABLET ORAL at 06:00

## 2025-05-30 RX ADMIN — ROSUVASTATIN CALCIUM 40 MG: 40 TABLET, FILM COATED ORAL at 22:22

## 2025-05-30 RX ADMIN — CITALOPRAM HYDROBROMIDE 40 MG: 40 TABLET ORAL at 20:18

## 2025-05-30 RX ADMIN — POLYETHYLENE GLYCOL 3350 17 G: 17 POWDER, FOR SOLUTION ORAL at 08:31

## 2025-05-30 RX ADMIN — SODIUM CHLORIDE, SODIUM LACTATE, POTASSIUM CHLORIDE, AND CALCIUM CHLORIDE 500 ML: .6; .31; .03; .02 INJECTION, SOLUTION INTRAVENOUS at 12:17

## 2025-05-30 RX ADMIN — HEPARIN SODIUM 5000 UNITS: 5000 INJECTION, SOLUTION INTRAVENOUS; SUBCUTANEOUS at 02:14

## 2025-05-30 RX ADMIN — HEPARIN SODIUM 5000 UNITS: 5000 INJECTION, SOLUTION INTRAVENOUS; SUBCUTANEOUS at 17:57

## 2025-05-30 RX ADMIN — FOLIC ACID 1 MG: 1 TABLET ORAL at 08:29

## 2025-05-30 RX ADMIN — HEPARIN SODIUM 5000 UNITS: 5000 INJECTION, SOLUTION INTRAVENOUS; SUBCUTANEOUS at 10:45

## 2025-05-30 ASSESSMENT — PAIN SCALES - GENERAL
PAINLEVEL_OUTOF10: 6
PAINLEVEL_OUTOF10: 8
PAINLEVEL_OUTOF10: 5 - MODERATE PAIN
PAINLEVEL_OUTOF10: 7
PAINLEVEL_OUTOF10: 5 - MODERATE PAIN

## 2025-05-30 ASSESSMENT — COGNITIVE AND FUNCTIONAL STATUS - GENERAL
MOBILITY SCORE: 17
HELP NEEDED FOR BATHING: A LITTLE
DRESSING REGULAR LOWER BODY CLOTHING: A LITTLE
TURNING FROM BACK TO SIDE WHILE IN FLAT BAD: A LITTLE
MOVING TO AND FROM BED TO CHAIR: A LITTLE
PERSONAL GROOMING: A LITTLE
CLIMB 3 TO 5 STEPS WITH RAILING: A LITTLE
STANDING UP FROM CHAIR USING ARMS: A LITTLE
HELP NEEDED FOR BATHING: A LITTLE
STANDING UP FROM CHAIR USING ARMS: A LITTLE
TURNING FROM BACK TO SIDE WHILE IN FLAT BAD: A LITTLE
TOILETING: A LITTLE
MOVING FROM LYING ON BACK TO SITTING ON SIDE OF FLAT BED WITH BEDRAILS: A LITTLE
STANDING UP FROM CHAIR USING ARMS: A LITTLE
MOVING FROM LYING ON BACK TO SITTING ON SIDE OF FLAT BED WITH BEDRAILS: A LITTLE
DRESSING REGULAR UPPER BODY CLOTHING: A LITTLE
TURNING FROM BACK TO SIDE WHILE IN FLAT BAD: A LITTLE
CLIMB 3 TO 5 STEPS WITH RAILING: A LOT
MOVING FROM LYING ON BACK TO SITTING ON SIDE OF FLAT BED WITH BEDRAILS: A LITTLE
EATING MEALS: A LITTLE
DRESSING REGULAR UPPER BODY CLOTHING: A LITTLE
MOVING TO AND FROM BED TO CHAIR: A LITTLE
WALKING IN HOSPITAL ROOM: A LITTLE
DRESSING REGULAR LOWER BODY CLOTHING: A LITTLE
MOVING TO AND FROM BED TO CHAIR: A LITTLE
WALKING IN HOSPITAL ROOM: A LITTLE
WALKING IN HOSPITAL ROOM: A LITTLE
CLIMB 3 TO 5 STEPS WITH RAILING: A LITTLE
TOILETING: A LITTLE

## 2025-05-30 ASSESSMENT — PAIN - FUNCTIONAL ASSESSMENT
PAIN_FUNCTIONAL_ASSESSMENT: 0-10

## 2025-05-30 ASSESSMENT — PAIN DESCRIPTION - LOCATION: LOCATION: ABDOMEN

## 2025-05-30 NOTE — PROGRESS NOTES
Physical Therapy    Therapy Communication Note    Patient Name: Winter Feliz  MRN: [id number]  Department: [facility] Room: [facility]600-  Today's Date: 5/30/2025       Discipline: Physical Therapy      PT Missed Visit: Yes  Missed Visit Reason: Other (Comment) (pt requested to be seen later; stated she just got back in bed and felt dizzy while seated in the chair)          Svetlana Belle, PT

## 2025-05-30 NOTE — DOCUMENTATION CLARIFICATION NOTE
PATIENT:               PETE BERNARD  ACCT #:                  [id number]  MRN:                       [id number]  :                       1970  ADMIT DATE:       2025 10:29 AM  DISCH DATE:  RESPONDING PROVIDER #:        [id number]          PROVIDER RESPONSE TEXT:    acute blood loss anemia in setting of recent surgery, not requiring transfusion at this time.    CDI QUERY TEXT:    Clarification    Instruction:    Based on your assessment of the patient and the clinical information, please provide the requested documentation by clicking on the appropriate radio button and enter any additional information if prompted.    Question: Is there a diagnosis indicative of the lab values    When answering this query, please exercise your independent professional judgment. The fact that a question is being asked, does not imply that any particular answer is desired or expected.    The patient's clinical indicators include:  Clinical Information:  54 y.o. female who presented with a right retroperitoneal liposarcoma and s/p rt radical nephrectomy.    Clinical Indicators:  -Hgb: : 12.4  : 9.6, 9.0  : 8.6  -EBL: 400ml    Treatment:  -monitoring of labs    Risk Factors: right nephrectomy, dense adhesions  Options provided:  -- Acute blood loss anemia is clinically significant and required treatment/monitoring  -- Acute blood loss anemia not requiring treatment or evaluation  -- Other - I will add my own diagnosis  -- Refer to Clinical Documentation Reviewer    Query created by: Elsy Oleary on 2025 9:29 AM      Electronically signed by:  JARED PORTER MD 2025 10:05 AM

## 2025-05-30 NOTE — PROGRESS NOTES
Physical Therapy    Physical Therapy Treatment    Patient Name: Winter Feliz  MRN: [id number]  Department: Psychiatric Room: [address] [place] [address]  Today's Date: 5/30/2025  Time Calculation  Start Time: 1425  Stop Time: 1447  Time Calculation (min): 22 min       Assessment/Plan   PT Assessment  Barriers to Discharge Home: No anticipated barriers  End of Session Communication: Bedside nurse  End of Session Patient Position: Bed, 3 rail up, Alarm on     PT Plan  Treatment/Interventions: Bed mobility, Transfer training, Gait training, Stair training, Balance training, Strengthening  PT Plan: Ongoing PT  PT Frequency: 3 times per week  PT Discharge Recommendations: Low intensity level of continued care  Equipment Recommended upon Discharge: Wheeled walker  PT Recommended Transfer Status: Assist x1, Assistive device  PT - OK to Discharge: Yes      General Visit Information:   PT  Visit  PT Received On: 05/30/25  Prior to Session Communication: Bedside nurse  Patient Position Received: Bed, 3 rail up, Alarm on     Subjective   Precautions:  Precautions  Medical Precautions: Fall precautions  Post-Surgical Precautions: Abdominal surgery precautions       Objective   Pain:  Pain Assessment  Pain Assessment: 0-10  0-10 (Numeric) Pain Score: 6  Pain Type: Surgical pain  Pain Location: Abdomen  Pain Interventions: Repositioned  Response to Interventions: No change in pain       Postural Control:     Static Sitting Balance  Static Sitting-Balance Support: Feet supported  Static Sitting-Level of Assistance: Close supervision  Static Sitting-Comment/Number of Minutes: sat EOB x2 trials for ~1-2 minutes each; c/o dizziness and requested to return to supine  Dynamic Sitting Balance  Dynamic Sitting-Balance Support: Feet supported  Dynamic Sitting-Level of Assistance: Close supervision  Static Standing Balance  Static Standing-Balance Support: Bilateral upper extremity supported  Static Standing-Level of Assistance: Contact guard  Dynamic Standing  Balance  Dynamic Standing-Balance Support: Bilateral upper extremity supported  Dynamic Standing-Level of Assistance: Contact guard      PT Treatments:           Bed Mobility  Bed Mobility: Yes  Bed Mobility 1  Bed Mobility 1: Supine to sitting  Level of Assistance 1: Contact guard  Bed Mobility Comments 1: HOB elevated, verbal cues, use of bed rail, increased time to perform due to pain  Bed Mobility 2  Bed Mobility  2: Sitting to supine  Level of Assistance 2: Minimum assistance  Bed Mobility Comments 2: verbal cues, increased time due to pain  Ambulation/Gait Training  Ambulation/Gait Training Performed: Yes  Ambulation/Gait Training 1  Device 1: Rolling walker  Assistance 1: Contact guard  Quality of Gait 1: Decreased step length (decreased lowell, verbal cues)  Comments/Distance (ft) 1: 5 feet (limited distance due to pain)  Transfers  Transfer: Yes  Transfer 1  Technique 1: Sit to stand, Stand to sit  Transfer Device 1: Walker  Transfer Level of Assistance 1: Contact guard  Trials/Comments 1: verbal cues for hand placement             Outcome Measures:  [facility] Basic Mobility  Turning from your back to your side while in a flat bed without using bedrails: A little  Moving from lying on your back to sitting on the side of a flat bed without using bedrails: A little  Moving to and from bed to chair (including a wheelchair): A little  Standing up from a chair using your arms (e.g. wheelchair or bedside chair): A little  To walk in hospital room: A little  Climbing 3-5 steps with railing: A lot  Basic Mobility - Total Score: 17                            Education Documentation  Precautions, taught by Svetlana Belle PT at 5/30/2025  2:57 PM.  Learner: Patient  Readiness: Acceptance  Method: Explanation  Response: Verbalizes Understanding  Comment: safe mobility, fall precautions    Mobility Training, taught by Svetlana Belle PT at 5/30/2025  2:57 PM.  Learner: Patient  Readiness: Acceptance  Method:  Explanation  Response: Verbalizes Understanding  Comment: safe mobility, fall precautions                 Encounter Problems       Encounter Problems (Active)       Balance       independent static/dynamic stance (Progressing)       Start:  05/29/25    Expected End:  06/19/25               Mobility       STG - Patient will ambulate 200 feet with LRD modified independent  (Progressing)       Start:  05/29/25    Expected End:  06/19/25            STG - Patient will ascend and descend 3 stairs with LRD modified independent  (Progressing)       Start:  05/29/25    Expected End:  06/19/25               PT Transfers       STG - Patient will perform bed mobility independent  (Progressing)       Start:  05/29/25    Expected End:  06/19/25            STG - Patient will transfer sit to and from stand with LRD modified independent  (Progressing)       Start:  05/29/25    Expected End:  06/19/25               Pain - Adult                Svetlana Belle, PT

## 2025-05-30 NOTE — PROGRESS NOTES
[facility]  DEPARTMENT OF SURGERY    SURGICAL ONCOLOGY  PROGRESS NOTE    SUBJECTIVE  Overnight, failed trial of void smith replaced (straight cath x2). Otherwise, feels okay overall without acute concerns, tolerating clear liquid diet without difficulty. Some abdominal cramping. Denies nausea or vomiting. Ambulating to bathroom and chair. Endorses passing flatus but denies having bowel movement.     OBJECTIVE  Vitals:  Temp:  [36.2 °C (97.1 °F)-36.6 °C (97.9 °F)] 36.4 °C (97.5 °F)  Heart Rate:  [79-96] 84  Resp:  [16] 16  BP: ()/(53-64) 113/64    I/Os:  I/O last 3 completed shifts:  In: 4379.3 (52.5 mL/kg) [P.O.:900; I.V.:2638.3 (31.6 mL/kg); IV Piggyback:500]  Out: 1325 (15.9 mL/kg) [Urine:1325 (0.4 mL/kg/hr)]  Weight: 83.4 kg     Exam  Gen: no acute distress  Card: non-cyanotic, non-tachycardic on monitor  Resp: non-labored breathing on 2-3L NC  Abd: soft, distended, appropriately tender to palpation along midline. 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 Smith replaced overnight for urinary retention.    PLAN:  - discontinue PCA  - start PO scheduled tylenol, PRN oxycodone and tramadol  - home wellbutrin, citalopram  - incentive spirometry, home Breo-Ellipta  - home ezetimibe, crestor  - continue holding amlodipine and chlorthalidone for now given  blood pressures  - advance to regular diet with supplemental shakes  - HLIVF  - maintain smith catheter  - obtain UA  - avoid nephrotoxic medications  - home synthroid  - PT, OOB  - SCDs, SQH    Patient seen and discussed with chief resident, Dr. Bean, who discussed with attending, Dr. Daisy Onofre MD  PGY-1   Surgical Oncology  Service Pager: [phone]

## 2025-05-30 NOTE — PROGRESS NOTES
Postop Pain HPI -   Palliative: relieved with IV analgesics and regional local anesthetics  Provocative: movement  Quality:  burning and aching  Radiation:  none  Severity:  0/10  Timing: constant    24-HOUR OPIOID CONSUMPTION:  Dilaudid PCA  [unfilled]    Scheduled medications  Scheduled Medications[1]  Continuous medications  Continuous Medications[2]  PRN medications  PRN Medications[3]     Physical Exam:  Constitutional:  no distress, alert and cooperative  Eyes: clear sclera  Head/Neck: No apparent injury, trachea midline  Respiratory/Thorax: Patent airways, thorax symmetric, breathing comfortably  Cardiovascular: no pitting edema  Gastrointestinal: Nondistended  Musculoskeletal: ROM intact  Extremities: no clubbing  Neurological: alert, vee x4  Psychological: Appropriate affect    Results for orders placed or performed during the hospital encounter of 05/28/25 (from the past 24 hours)   CBC   Result Value Ref Range    WBC 14.6 (H) 4.4 - 11.3 x10*3/uL    nRBC 0.0 0.0 - 0.0 /100 WBCs    RBC 3.31 (L) 4.00 - 5.20 x10*6/uL    Hemoglobin 9.0 (L) 12.0 - 16.0 g/dL    Hematocrit 28.5 (L) 36.0 - 46.0 %    MCV 86 80 - 100 fL    MCH 27.2 26.0 - 34.0 pg    MCHC 31.6 (L) 32.0 - 36.0 g/dL    RDW 13.3 11.5 - 14.5 %    Platelets 277 150 - 450 x10*3/uL   CBC   Result Value Ref Range    WBC 11.7 (H) 4.4 - 11.3 x10*3/uL    nRBC 0.0 0.0 - 0.0 /100 WBCs    RBC 3.19 (L) 4.00 - 5.20 x10*6/uL    Hemoglobin 8.6 (L) 12.0 - 16.0 g/dL    Hematocrit 27.2 (L) 36.0 - 46.0 %    MCV 85 80 - 100 fL    MCH 27.0 26.0 - 34.0 pg    MCHC 31.6 (L) 32.0 - 36.0 g/dL    RDW 13.3 11.5 - 14.5 %    Platelets 234 150 - 450 x10*3/uL   Comprehensive Metabolic Panel   Result Value Ref Range    Glucose 86 74 - 99 mg/dL    Sodium 139 136 - 145 mmol/L    Potassium 3.2 (L) 3.5 - 5.3 mmol/L    Chloride 103 98 - 107 mmol/L    Bicarbonate 27 21 - 32 mmol/L    Anion Gap 12 10 - 20 mmol/L    Urea Nitrogen 11 6 - 23 mg/dL    Creatinine 1.58 (H) 0.50 - 1.05 mg/dL     eGFR 38 (L) >60 mL/min/1.73m*2    Calcium 8.2 (L) 8.6 - 10.6 mg/dL    Albumin 3.5 3.4 - 5.0 g/dL    Alkaline Phosphatase 47 33 - 110 U/L    Total Protein 5.4 (L) 6.4 - 8.2 g/dL    AST 61 (H) 9 - 39 U/L    Bilirubin, Total 1.1 0.0 - 1.2 mg/dL    ALT 42 7 - 45 U/L   Magnesium   Result Value Ref Range    Magnesium 2.25 1.60 - 2.40 mg/dL      Winter Feliz is a 54 y.o. year old female patient who presents for Procedure(s):  Radical resection of right retroperitoneal sarcoma with right nephrectomy with Enzo Villa MD MPH on 5/28/2025. Acute Pain consulted for assistance with pain control.      Plan:    - Bilateral erector spinae blocks with catheters performed pre-operatively on 05/28/2025  - catheters were removed today with no complications, tips intact  - Rest of pain management per primary team  - will sign off    Acute Pain Resident  pg [phone] ph [phone]        [1] acetaminophen, 650 mg, oral, q6h  [Held by provider] amLODIPine, 5 mg, oral, Daily  buPROPion XL, 150 mg, oral, Daily  [Held by provider] chlorthalidone, 25 mg, oral, Daily  citalopram, 40 mg, oral, Daily  [START ON 6/1/2025] ergocalciferol, 1.25 mg, oral, Every Sunday  ezetimibe, 10 mg, oral, Daily  fluticasone furoate-vilanteroL, 1 puff, inhalation, Daily  folic acid, 1 mg, oral, Daily  heparin, 5,000 Units, subcutaneous, q8h  levothyroxine, 100 mcg, oral, Daily before breakfast  pantoprazole, 40 mg, oral, BID  polyethylene glycol, 17 g, oral, Daily  potassium chloride CR, 10 mEq, oral, Daily  rosuvastatin, 40 mg, oral, Daily  sennosides-docusate sodium, 1 tablet, oral, Nightly  [2] ropivacaine (PF) in NS cmpd, 14 mL/hr, Last Rate: 14 mL/hr (05/28/25 3107)  [3] PRN medications: albuterol, ondansetron ODT **OR** ondansetron, oxyCODONE, oxygen, traMADol

## 2025-05-31 ENCOUNTER — HOME HEALTH ADMISSION (OUTPATIENT)
Dept: HOME HEALTH SERVICES | Facility: HOME HEALTH | Age: 55
End: 2025-05-31
Payer: COMMERCIAL

## 2025-05-31 ENCOUNTER — APPOINTMENT (OUTPATIENT)
Dept: RADIOLOGY | Facility: HOSPITAL | Age: 55
End: 2025-05-31
Payer: COMMERCIAL

## 2025-05-31 LAB
ALBUMIN SERPL BCP-MCNC: 3.3 G/DL (ref 3.4–5)
ALP SERPL-CCNC: 59 U/L (ref 33–110)
ALT SERPL W P-5'-P-CCNC: 34 U/L (ref 7–45)
ANION GAP SERPL CALC-SCNC: 13 MMOL/L (ref 10–20)
AST SERPL W P-5'-P-CCNC: 35 U/L (ref 9–39)
BILIRUB SERPL-MCNC: 0.8 MG/DL (ref 0–1.2)
BLOOD EXPIRATION DATE: NORMAL
BUN SERPL-MCNC: 7 MG/DL (ref 6–23)
CALCIUM SERPL-MCNC: 8.1 MG/DL (ref 8.6–10.6)
CHLORIDE SERPL-SCNC: 102 MMOL/L (ref 98–107)
CO2 SERPL-SCNC: 30 MMOL/L (ref 21–32)
CREAT SERPL-MCNC: 1.32 MG/DL (ref 0.5–1.05)
DISPENSE STATUS: NORMAL
EGFRCR SERPLBLD CKD-EPI 2021: 47 ML/MIN/1.73M*2
ERYTHROCYTE [DISTWIDTH] IN BLOOD BY AUTOMATED COUNT: 13.5 % (ref 11.5–14.5)
GLUCOSE SERPL-MCNC: 99 MG/DL (ref 74–99)
HCT VFR BLD AUTO: 30.3 % (ref 36–46)
HGB BLD-MCNC: 9.5 G/DL (ref 12–16)
MAGNESIUM SERPL-MCNC: 2.05 MG/DL (ref 1.6–2.4)
MCH RBC QN AUTO: 27.1 PG (ref 26–34)
MCHC RBC AUTO-ENTMCNC: 31.4 G/DL (ref 32–36)
MCV RBC AUTO: 86 FL (ref 80–100)
NRBC BLD-RTO: 0 /100 WBCS (ref 0–0)
PLATELET # BLD AUTO: 268 X10*3/UL (ref 150–450)
POTASSIUM SERPL-SCNC: 3.4 MMOL/L (ref 3.5–5.3)
PRODUCT BLOOD TYPE: 6200
PRODUCT CODE: NORMAL
PROT SERPL-MCNC: 5.6 G/DL (ref 6.4–8.2)
RBC # BLD AUTO: 3.51 X10*6/UL (ref 4–5.2)
SODIUM SERPL-SCNC: 142 MMOL/L (ref 136–145)
UNIT ABO: NORMAL
UNIT NUMBER: NORMAL
UNIT RH: NORMAL
UNIT VOLUME: 350
WBC # BLD AUTO: 10.6 X10*3/UL (ref 4.4–11.3)
XM INTEP: NORMAL

## 2025-05-31 PROCEDURE — 71045 X-RAY EXAM CHEST 1 VIEW: CPT

## 2025-05-31 PROCEDURE — 2500000004 HC RX 250 GENERAL PHARMACY W/ HCPCS (ALT 636 FOR OP/ED): Mod: JZ | Performed by: STUDENT IN AN ORGANIZED HEALTH CARE EDUCATION/TRAINING PROGRAM

## 2025-05-31 PROCEDURE — 2500000002 HC RX 250 W HCPCS SELF ADMINISTERED DRUGS (ALT 637 FOR MEDICARE OP, ALT 636 FOR OP/ED)

## 2025-05-31 PROCEDURE — 2500000001 HC RX 250 WO HCPCS SELF ADMINISTERED DRUGS (ALT 637 FOR MEDICARE OP): Performed by: NURSE PRACTITIONER

## 2025-05-31 PROCEDURE — 2500000005 HC RX 250 GENERAL PHARMACY W/O HCPCS

## 2025-05-31 PROCEDURE — 2500000001 HC RX 250 WO HCPCS SELF ADMINISTERED DRUGS (ALT 637 FOR MEDICARE OP)

## 2025-05-31 PROCEDURE — 94640 AIRWAY INHALATION TREATMENT: CPT

## 2025-05-31 PROCEDURE — 1170000001 HC PRIVATE ONCOLOGY ROOM DAILY

## 2025-05-31 PROCEDURE — 2500000004 HC RX 250 GENERAL PHARMACY W/ HCPCS (ALT 636 FOR OP/ED): Mod: JZ

## 2025-05-31 PROCEDURE — 85027 COMPLETE CBC AUTOMATED: CPT

## 2025-05-31 PROCEDURE — 84075 ASSAY ALKALINE PHOSPHATASE: CPT

## 2025-05-31 PROCEDURE — 36415 COLL VENOUS BLD VENIPUNCTURE: CPT

## 2025-05-31 PROCEDURE — 83735 ASSAY OF MAGNESIUM: CPT

## 2025-05-31 PROCEDURE — 2500000004 HC RX 250 GENERAL PHARMACY W/ HCPCS (ALT 636 FOR OP/ED)

## 2025-05-31 PROCEDURE — 2500000001 HC RX 250 WO HCPCS SELF ADMINISTERED DRUGS (ALT 637 FOR MEDICARE OP): Performed by: STUDENT IN AN ORGANIZED HEALTH CARE EDUCATION/TRAINING PROGRAM

## 2025-05-31 RX ORDER — ALUMINUM HYDROXIDE, MAGNESIUM HYDROXIDE, AND SIMETHICONE 1200; 120; 1200 MG/30ML; MG/30ML; MG/30ML
10 SUSPENSION ORAL
Status: DISCONTINUED | OUTPATIENT
Start: 2025-05-31 | End: 2025-05-31

## 2025-05-31 RX ORDER — POTASSIUM CHLORIDE 14.9 MG/ML
20 INJECTION INTRAVENOUS ONCE
Status: COMPLETED | OUTPATIENT
Start: 2025-05-31 | End: 2025-05-31

## 2025-05-31 RX ORDER — POTASSIUM CHLORIDE 1.5 G/1.58G
20 POWDER, FOR SOLUTION ORAL ONCE
Status: DISCONTINUED | OUTPATIENT
Start: 2025-05-31 | End: 2025-06-01

## 2025-05-31 RX ORDER — SODIUM CHLORIDE 9 MG/ML
50 INJECTION, SOLUTION INTRAVENOUS CONTINUOUS
Status: DISCONTINUED | OUTPATIENT
Start: 2025-05-31 | End: 2025-06-01

## 2025-05-31 RX ORDER — CALCIUM CARBONATE 200(500)MG
500 TABLET,CHEWABLE ORAL ONCE
Status: COMPLETED | OUTPATIENT
Start: 2025-05-31 | End: 2025-05-31

## 2025-05-31 RX ORDER — CALCIUM CARBONATE 200(500)MG
500 TABLET,CHEWABLE ORAL DAILY
Status: DISCONTINUED | OUTPATIENT
Start: 2025-05-31 | End: 2025-06-04 | Stop reason: HOSPADM

## 2025-05-31 RX ADMIN — BUPROPION HYDROCHLORIDE 150 MG: 150 TABLET, EXTENDED RELEASE ORAL at 09:32

## 2025-05-31 RX ADMIN — TRAMADOL HYDROCHLORIDE 50 MG: 50 TABLET, COATED ORAL at 11:09

## 2025-05-31 RX ADMIN — SODIUM CHLORIDE 50 ML/HR: 0.9 INJECTION, SOLUTION INTRAVENOUS at 04:57

## 2025-05-31 RX ADMIN — ALUMINUM HYDROXIDE, MAGNESIUM HYDROXIDE, AND SIMETHICONE 10 ML: 200; 200; 20 SUSPENSION ORAL at 11:10

## 2025-05-31 RX ADMIN — OXYCODONE HYDROCHLORIDE 5 MG: 5 TABLET ORAL at 17:15

## 2025-05-31 RX ADMIN — HEPARIN SODIUM 5000 UNITS: 5000 INJECTION, SOLUTION INTRAVENOUS; SUBCUTANEOUS at 17:32

## 2025-05-31 RX ADMIN — POTASSIUM CHLORIDE 20 MEQ: 14.9 INJECTION, SOLUTION INTRAVENOUS at 12:55

## 2025-05-31 RX ADMIN — POTASSIUM PHOSPHATE, MONOBASIC AND POTASSIUM PHOSPHATE, DIBASIC 21 MMOL: 224; 236 INJECTION, SOLUTION, CONCENTRATE INTRAVENOUS at 01:16

## 2025-05-31 RX ADMIN — LEVOTHYROXINE SODIUM 100 MCG: 100 TABLET ORAL at 07:04

## 2025-05-31 RX ADMIN — ACETAMINOPHEN 650 MG: 325 TABLET ORAL at 17:15

## 2025-05-31 RX ADMIN — POTASSIUM CHLORIDE 20 MEQ: 14.9 INJECTION, SOLUTION INTRAVENOUS at 09:42

## 2025-05-31 RX ADMIN — ALUMINUM HYDROXIDE, MAGNESIUM HYDROXIDE, AND SIMETHICONE 10 ML: 200; 200; 20 SUSPENSION ORAL at 09:30

## 2025-05-31 RX ADMIN — ACETAMINOPHEN 650 MG: 325 TABLET ORAL at 23:00

## 2025-05-31 RX ADMIN — ACETAMINOPHEN 650 MG: 325 TABLET ORAL at 05:13

## 2025-05-31 RX ADMIN — PANTOPRAZOLE SODIUM 40 MG: 40 TABLET, DELAYED RELEASE ORAL at 09:31

## 2025-05-31 RX ADMIN — HEPARIN SODIUM 5000 UNITS: 5000 INJECTION, SOLUTION INTRAVENOUS; SUBCUTANEOUS at 09:38

## 2025-05-31 RX ADMIN — HEPARIN SODIUM 5000 UNITS: 5000 INJECTION, SOLUTION INTRAVENOUS; SUBCUTANEOUS at 02:34

## 2025-05-31 RX ADMIN — TRAMADOL HYDROCHLORIDE 50 MG: 50 TABLET, COATED ORAL at 18:51

## 2025-05-31 RX ADMIN — SODIUM CHLORIDE 50 ML/HR: 0.9 INJECTION, SOLUTION INTRAVENOUS at 01:20

## 2025-05-31 RX ADMIN — PANTOPRAZOLE SODIUM 40 MG: 40 TABLET, DELAYED RELEASE ORAL at 19:52

## 2025-05-31 RX ADMIN — CALCIUM CARBONATE (ANTACID) CHEW TAB 500 MG 1 TABLET: 500 CHEW TAB at 23:00

## 2025-05-31 RX ADMIN — LIDOCAINE 1 PATCH: 4 PATCH TOPICAL at 09:35

## 2025-05-31 RX ADMIN — FOLIC ACID 1 MG: 1 TABLET ORAL at 09:31

## 2025-05-31 RX ADMIN — CITALOPRAM HYDROBROMIDE 40 MG: 40 TABLET ORAL at 19:52

## 2025-05-31 RX ADMIN — EZETIMIBE 10 MG: 10 TABLET ORAL at 19:52

## 2025-05-31 RX ADMIN — FLUTICASONE FUROATE AND VILANTEROL TRIFENATATE 1 PUFF: 200; 25 POWDER RESPIRATORY (INHALATION) at 09:54

## 2025-05-31 RX ADMIN — ROSUVASTATIN CALCIUM 40 MG: 40 TABLET, FILM COATED ORAL at 19:52

## 2025-05-31 RX ADMIN — CALCIUM CARBONATE (ANTACID) CHEW TAB 500 MG 1 TABLET: 500 CHEW TAB at 17:32

## 2025-05-31 ASSESSMENT — COGNITIVE AND FUNCTIONAL STATUS - GENERAL
MOBILITY SCORE: 17
MOBILITY SCORE: 17
HELP NEEDED FOR BATHING: A LITTLE
TURNING FROM BACK TO SIDE WHILE IN FLAT BAD: A LITTLE
DAILY ACTIVITIY SCORE: 20
DAILY ACTIVITIY SCORE: 20
DRESSING REGULAR UPPER BODY CLOTHING: A LITTLE
DRESSING REGULAR LOWER BODY CLOTHING: A LITTLE
TOILETING: A LITTLE
DRESSING REGULAR UPPER BODY CLOTHING: A LITTLE
CLIMB 3 TO 5 STEPS WITH RAILING: A LOT
WALKING IN HOSPITAL ROOM: A LITTLE
MOVING FROM LYING ON BACK TO SITTING ON SIDE OF FLAT BED WITH BEDRAILS: A LITTLE
DRESSING REGULAR LOWER BODY CLOTHING: A LITTLE
HELP NEEDED FOR BATHING: A LITTLE
MOVING TO AND FROM BED TO CHAIR: A LITTLE
WALKING IN HOSPITAL ROOM: A LITTLE
MOVING FROM LYING ON BACK TO SITTING ON SIDE OF FLAT BED WITH BEDRAILS: A LITTLE
MOVING TO AND FROM BED TO CHAIR: A LITTLE
STANDING UP FROM CHAIR USING ARMS: A LITTLE
STANDING UP FROM CHAIR USING ARMS: A LITTLE
TOILETING: A LITTLE
TURNING FROM BACK TO SIDE WHILE IN FLAT BAD: A LITTLE
CLIMB 3 TO 5 STEPS WITH RAILING: A LOT

## 2025-05-31 ASSESSMENT — PAIN - FUNCTIONAL ASSESSMENT
PAIN_FUNCTIONAL_ASSESSMENT: 0-10

## 2025-05-31 ASSESSMENT — PAIN SCALES - GENERAL
PAINLEVEL_OUTOF10: 7
PAINLEVEL_OUTOF10: 0 - NO PAIN
PAINLEVEL_OUTOF10: 5 - MODERATE PAIN
PAINLEVEL_OUTOF10: 0 - NO PAIN
PAINLEVEL_OUTOF10: 3
PAINLEVEL_OUTOF10: 8

## 2025-05-31 ASSESSMENT — PAIN DESCRIPTION - LOCATION
LOCATION: ABDOMEN
LOCATION: ABDOMEN

## 2025-05-31 NOTE — PROGRESS NOTES
5/31/25 @0845 Transitional Care Coordinator Note  Called into pt's room to introduce myself, role and to discuss discharge planning, no answer. Referral sent to [facility] and [facility] for a wheeled walker via [facility], awaiting response. Will continue to follow.   Alfonzo Sesay RN

## 2025-05-31 NOTE — PROGRESS NOTES
[facility]  DEPARTMENT OF SURGERY    SURGICAL ONCOLOGY  PROGRESS NOTE    SUBJECTIVE  NAEO.  Small emesis this morning.  Been OOB, ambulating, denies hiccups.  Endorse flatus.    OBJECTIVE  Vitals:  Temp:  [36.3 °C (97.3 °F)-37.1 °C (98.8 °F)] 37.1 °C (98.8 °F)  Heart Rate:  [68-82] 71  Resp:  [16-18] 16  BP: (105-144)/(67-76) 117/75    I/Os:  I/O last 3 completed shifts:  In: 3734.7 (44.3 mL/kg) [P.O.:1140; I.V.:1644.7 (19.5 mL/kg); IV Piggyback:950]  Out: 3900 (46.2 mL/kg) [Urine:3900 (1.3 mL/kg/hr)]  Weight: 84.4 kg     Exam  Gen: no acute distress  Card: non-cyanotic, non-tachycardic on monitor  Resp: non-labored breathing on 1.5L NC  Abd: soft, distended, appropriately tender to palpation along midline. 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 Smith replaced overnight for urinary retention.    PLAN:  - Continue PO scheduled tylenol, PRN oxycodone and tramadol  - home wellbutrin, citalopram  - incentive spirometry, home Breo-Ellipta  - home ezetimibe, crestor  - continue holding amlodipine and chlorthalidone for now given blood pressures  - advance to full liquid  - IVF at 50  - maintain smith catheter   > UA negative  - avoid nephrotoxic medications  - home synthroid  - PT, OOB  - SCDs, SQH    Patient seen and discussed with chief resident, Dr. Bean, and  attending Dr. Adelita Talavera MD  General Surgery, PGY-3  Surgical Oncology  Service Pager: [phone]

## 2025-05-31 NOTE — SIGNIFICANT EVENT
Patient seen overnight to establish baseline exam.    Remains on 3L. Endorses tolerable and appropriate abdominal pain described as cramping. CXR reviewed showing atelectasis.      Plan to continue to encourage I/S to wean O2.    Waldo Larios DO PGY-1  General Surgery

## 2025-06-01 VITALS
BODY MASS INDEX: 30.98 KG/M2 | OXYGEN SATURATION: 95 % | RESPIRATION RATE: 18 BRPM | HEIGHT: 64 IN | TEMPERATURE: 98.6 F | HEART RATE: 70 BPM | WEIGHT: 181.44 LBS | SYSTOLIC BLOOD PRESSURE: 123 MMHG | DIASTOLIC BLOOD PRESSURE: 72 MMHG

## 2025-06-01 LAB
ALBUMIN SERPL BCP-MCNC: 3.7 G/DL (ref 3.4–5)
ALP SERPL-CCNC: 89 U/L (ref 33–110)
ALT SERPL W P-5'-P-CCNC: 29 U/L (ref 7–45)
ANION GAP SERPL CALC-SCNC: 16 MMOL/L (ref 10–20)
AST SERPL W P-5'-P-CCNC: 25 U/L (ref 9–39)
BILIRUB SERPL-MCNC: 1 MG/DL (ref 0–1.2)
BUN SERPL-MCNC: 7 MG/DL (ref 6–23)
CALCIUM SERPL-MCNC: 8.6 MG/DL (ref 8.6–10.6)
CHLORIDE SERPL-SCNC: 99 MMOL/L (ref 98–107)
CO2 SERPL-SCNC: 30 MMOL/L (ref 21–32)
CREAT SERPL-MCNC: 1.3 MG/DL (ref 0.5–1.05)
EGFRCR SERPLBLD CKD-EPI 2021: 48 ML/MIN/1.73M*2
ERYTHROCYTE [DISTWIDTH] IN BLOOD BY AUTOMATED COUNT: 13.3 % (ref 11.5–14.5)
GLUCOSE SERPL-MCNC: 99 MG/DL (ref 74–99)
HCT VFR BLD AUTO: 32.4 % (ref 36–46)
HGB BLD-MCNC: 10.3 G/DL (ref 12–16)
MAGNESIUM SERPL-MCNC: 2.33 MG/DL (ref 1.6–2.4)
MCH RBC QN AUTO: 27.2 PG (ref 26–34)
MCHC RBC AUTO-ENTMCNC: 31.8 G/DL (ref 32–36)
MCV RBC AUTO: 86 FL (ref 80–100)
NRBC BLD-RTO: 0 /100 WBCS (ref 0–0)
PLATELET # BLD AUTO: 334 X10*3/UL (ref 150–450)
POTASSIUM SERPL-SCNC: 3.6 MMOL/L (ref 3.5–5.3)
PROT SERPL-MCNC: 6.4 G/DL (ref 6.4–8.2)
RBC # BLD AUTO: 3.79 X10*6/UL (ref 4–5.2)
SODIUM SERPL-SCNC: 141 MMOL/L (ref 136–145)
WBC # BLD AUTO: 9.8 X10*3/UL (ref 4.4–11.3)

## 2025-06-01 PROCEDURE — 2500000001 HC RX 250 WO HCPCS SELF ADMINISTERED DRUGS (ALT 637 FOR MEDICARE OP): Performed by: NURSE PRACTITIONER

## 2025-06-01 PROCEDURE — 2500000004 HC RX 250 GENERAL PHARMACY W/ HCPCS (ALT 636 FOR OP/ED)

## 2025-06-01 PROCEDURE — 85027 COMPLETE CBC AUTOMATED: CPT

## 2025-06-01 PROCEDURE — 2500000001 HC RX 250 WO HCPCS SELF ADMINISTERED DRUGS (ALT 637 FOR MEDICARE OP)

## 2025-06-01 PROCEDURE — 2500000004 HC RX 250 GENERAL PHARMACY W/ HCPCS (ALT 636 FOR OP/ED): Mod: JZ

## 2025-06-01 PROCEDURE — 36415 COLL VENOUS BLD VENIPUNCTURE: CPT

## 2025-06-01 PROCEDURE — 80053 COMPREHEN METABOLIC PANEL: CPT

## 2025-06-01 PROCEDURE — 83735 ASSAY OF MAGNESIUM: CPT

## 2025-06-01 PROCEDURE — 2500000002 HC RX 250 W HCPCS SELF ADMINISTERED DRUGS (ALT 637 FOR MEDICARE OP, ALT 636 FOR OP/ED)

## 2025-06-01 PROCEDURE — 94640 AIRWAY INHALATION TREATMENT: CPT

## 2025-06-01 PROCEDURE — 1170000001 HC PRIVATE ONCOLOGY ROOM DAILY

## 2025-06-01 RX ORDER — POTASSIUM CHLORIDE 14.9 MG/ML
20 INJECTION INTRAVENOUS
Status: COMPLETED | OUTPATIENT
Start: 2025-06-01 | End: 2025-06-01

## 2025-06-01 RX ORDER — SODIUM CHLORIDE, SODIUM LACTATE, POTASSIUM CHLORIDE, CALCIUM CHLORIDE 600; 310; 30; 20 MG/100ML; MG/100ML; MG/100ML; MG/100ML
100 INJECTION, SOLUTION INTRAVENOUS CONTINUOUS
Status: ACTIVE | OUTPATIENT
Start: 2025-06-01 | End: 2025-06-02

## 2025-06-01 RX ORDER — CALCIUM CARBONATE 200(500)MG
500 TABLET,CHEWABLE ORAL ONCE
Status: COMPLETED | OUTPATIENT
Start: 2025-06-01 | End: 2025-06-01

## 2025-06-01 RX ADMIN — POTASSIUM CHLORIDE 20 MEQ: 14.9 INJECTION, SOLUTION INTRAVENOUS at 08:43

## 2025-06-01 RX ADMIN — HEPARIN SODIUM 5000 UNITS: 5000 INJECTION, SOLUTION INTRAVENOUS; SUBCUTANEOUS at 17:37

## 2025-06-01 RX ADMIN — OXYCODONE HYDROCHLORIDE 5 MG: 5 TABLET ORAL at 23:00

## 2025-06-01 RX ADMIN — ACETAMINOPHEN 650 MG: 325 TABLET ORAL at 11:45

## 2025-06-01 RX ADMIN — FLUTICASONE FUROATE AND VILANTEROL TRIFENATATE 1 PUFF: 200; 25 POWDER RESPIRATORY (INHALATION) at 09:59

## 2025-06-01 RX ADMIN — LEVOTHYROXINE SODIUM 100 MCG: 100 TABLET ORAL at 08:38

## 2025-06-01 RX ADMIN — CALCIUM CARBONATE (ANTACID) CHEW TAB 500 MG 1 TABLET: 500 CHEW TAB at 22:58

## 2025-06-01 RX ADMIN — ACETAMINOPHEN 650 MG: 325 TABLET ORAL at 22:58

## 2025-06-01 RX ADMIN — EZETIMIBE 10 MG: 10 TABLET ORAL at 20:08

## 2025-06-01 RX ADMIN — HEPARIN SODIUM 5000 UNITS: 5000 INJECTION, SOLUTION INTRAVENOUS; SUBCUTANEOUS at 01:24

## 2025-06-01 RX ADMIN — ROSUVASTATIN CALCIUM 40 MG: 40 TABLET, FILM COATED ORAL at 20:08

## 2025-06-01 RX ADMIN — ACETAMINOPHEN 650 MG: 325 TABLET ORAL at 17:37

## 2025-06-01 RX ADMIN — ERGOCALCIFEROL 1.25 MG: 1.25 CAPSULE ORAL at 08:38

## 2025-06-01 RX ADMIN — PANTOPRAZOLE SODIUM 40 MG: 40 TABLET, DELAYED RELEASE ORAL at 08:38

## 2025-06-01 RX ADMIN — SODIUM CHLORIDE, POTASSIUM CHLORIDE, SODIUM LACTATE AND CALCIUM CHLORIDE 50 ML/HR: 600; 310; 30; 20 INJECTION, SOLUTION INTRAVENOUS at 15:57

## 2025-06-01 RX ADMIN — PANTOPRAZOLE SODIUM 40 MG: 40 TABLET, DELAYED RELEASE ORAL at 20:08

## 2025-06-01 RX ADMIN — ONDANSETRON 4 MG: 2 INJECTION INTRAMUSCULAR; INTRAVENOUS at 13:48

## 2025-06-01 RX ADMIN — HEPARIN SODIUM 5000 UNITS: 5000 INJECTION, SOLUTION INTRAVENOUS; SUBCUTANEOUS at 09:26

## 2025-06-01 RX ADMIN — OXYCODONE HYDROCHLORIDE 5 MG: 5 TABLET ORAL at 01:24

## 2025-06-01 RX ADMIN — OXYCODONE HYDROCHLORIDE 5 MG: 5 TABLET ORAL at 08:38

## 2025-06-01 RX ADMIN — BUPROPION HYDROCHLORIDE 150 MG: 150 TABLET, EXTENDED RELEASE ORAL at 08:39

## 2025-06-01 RX ADMIN — OXYCODONE HYDROCHLORIDE 5 MG: 5 TABLET ORAL at 17:37

## 2025-06-01 RX ADMIN — POTASSIUM CHLORIDE 20 MEQ: 14.9 INJECTION, SOLUTION INTRAVENOUS at 11:45

## 2025-06-01 RX ADMIN — FOLIC ACID 1 MG: 1 TABLET ORAL at 08:38

## 2025-06-01 RX ADMIN — CITALOPRAM HYDROBROMIDE 40 MG: 40 TABLET ORAL at 20:08

## 2025-06-01 RX ADMIN — CALCIUM CARBONATE (ANTACID) CHEW TAB 500 MG 1 TABLET: 500 CHEW TAB at 08:38

## 2025-06-01 RX ADMIN — SENNOSIDES AND DOCUSATE SODIUM 1 TABLET: 50; 8.6 TABLET ORAL at 20:08

## 2025-06-01 ASSESSMENT — COGNITIVE AND FUNCTIONAL STATUS - GENERAL
STANDING UP FROM CHAIR USING ARMS: A LITTLE
HELP NEEDED FOR BATHING: A LITTLE
TURNING FROM BACK TO SIDE WHILE IN FLAT BAD: A LITTLE
WALKING IN HOSPITAL ROOM: A LITTLE
HELP NEEDED FOR BATHING: A LITTLE
MOBILITY SCORE: 19
MOVING TO AND FROM BED TO CHAIR: A LITTLE
TURNING FROM BACK TO SIDE WHILE IN FLAT BAD: A LITTLE
DRESSING REGULAR UPPER BODY CLOTHING: A LITTLE
CLIMB 3 TO 5 STEPS WITH RAILING: A LITTLE
MOVING TO AND FROM BED TO CHAIR: A LITTLE
TOILETING: A LITTLE
DRESSING REGULAR LOWER BODY CLOTHING: A LITTLE
WALKING IN HOSPITAL ROOM: A LITTLE
STANDING UP FROM CHAIR USING ARMS: A LITTLE
DAILY ACTIVITIY SCORE: 20
TOILETING: A LITTLE
DAILY ACTIVITIY SCORE: 20
CLIMB 3 TO 5 STEPS WITH RAILING: A LITTLE
DRESSING REGULAR UPPER BODY CLOTHING: A LITTLE
MOBILITY SCORE: 19
DRESSING REGULAR LOWER BODY CLOTHING: A LITTLE

## 2025-06-01 ASSESSMENT — PAIN SCALES - GENERAL
PAINLEVEL_OUTOF10: 7
PAINLEVEL_OUTOF10: 3
PAINLEVEL_OUTOF10: 4
PAINLEVEL_OUTOF10: 2
PAINLEVEL_OUTOF10: 7
PAINLEVEL_OUTOF10: 4
PAINLEVEL_OUTOF10: 7

## 2025-06-01 ASSESSMENT — PAIN - FUNCTIONAL ASSESSMENT
PAIN_FUNCTIONAL_ASSESSMENT: 0-10
PAIN_FUNCTIONAL_ASSESSMENT: UNABLE TO SELF-REPORT
PAIN_FUNCTIONAL_ASSESSMENT: 0-10
PAIN_FUNCTIONAL_ASSESSMENT: 0-10

## 2025-06-01 ASSESSMENT — PAIN DESCRIPTION - LOCATION
LOCATION: ABDOMEN
LOCATION: ABDOMEN

## 2025-06-01 ASSESSMENT — PAIN DESCRIPTION - ORIENTATION: ORIENTATION: MID

## 2025-06-01 NOTE — PROGRESS NOTES
[facility]  DEPARTMENT OF SURGERY    SURGICAL ONCOLOGY  PROGRESS NOTE    SUBJECTIVE  CXR overnight showing atelectasis, IS encouraged. Reports abdominal spasms. Tolerating liquids. Endorses passing flatus and having bowel function. Intermittent hiccups.     OBJECTIVE  Vitals:  Temp:  [36.4 °C (97.5 °F)-37.1 °C (98.8 °F)] 36.9 °C (98.4 °F)  Heart Rate:  [68-82] 68  Resp:  [16-20] 16  BP: (111-155)/(58-82) 155/82    I/Os:  I/O last 3 completed shifts:  In: 3423 (40.6 mL/kg) [P.O.:780; I.V.:1493 (17.7 mL/kg); IV Piggyback:1150]  Out: 4100 (48.6 mL/kg) [Urine:4100 (1.3 mL/kg/hr)]  Weight: 84.4 kg     Exam  Gen: no acute distress  Card: non-cyanotic  Resp: non-labored breathing on 2L NC  Abd: soft, distended but improved from prior exam, appropriately tender to palpation along midline. 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 Smith replaced 5/30 for urinary retention. Progressing well on RNF.     PLAN:  - Continue PO scheduled tylenol, PRN oxycodone and tramadol  - home wellbutrin, citalopram  - incentive spirometry, home Breo-Ellipta  - home ezetimibe, crestor  - continue holding amlodipine and chlorthalidone for now given blood pressures  - advance to regular diet  - HLIVF  - maintain smith catheter  - if AM labs okay, can start robaxin for muscle spasms  - avoid  nephrotoxic medications  - home synthroid  - PT, OOB  - SCDs, SQH    Patient seen and discussed with chief resident, Dr. Bean, and discussed with attending Dr. Adelita Onofre MD  PGY-1   Surgical Oncology  Service Pager: [phone]

## 2025-06-01 NOTE — PROGRESS NOTES
6/1/25 @0961 Transitional Care Coordinator Note  Per team pt will not discharge today, Yessi Abraham RN at [facility] made aware. Will continue to follow.

## 2025-06-01 NOTE — CARE PLAN
The clinical goals for the shift include pt to remain hds    Problem: Pain - Adult  Goal: Verbalizes/displays adequate comfort level or baseline comfort level  Outcome: Progressing     Problem: Safety - Adult  Goal: Free from fall injury  Outcome: Progressing     Problem: Discharge Planning  Goal: Discharge to home or other facility with appropriate resources  Outcome: Progressing     Problem: Chronic Conditions and Co-morbidities  Goal: Patient's chronic conditions and co-morbidity symptoms are monitored and maintained or improved  Outcome: Progressing     Problem: Nutrition  Goal: Nutrient intake appropriate for maintaining nutritional needs  Outcome: Progressing

## 2025-06-02 LAB
ALBUMIN SERPL BCP-MCNC: 3.6 G/DL (ref 3.4–5)
ALP SERPL-CCNC: 157 U/L (ref 33–110)
ALT SERPL W P-5'-P-CCNC: 26 U/L (ref 7–45)
ANION GAP SERPL CALC-SCNC: 15 MMOL/L (ref 10–20)
AST SERPL W P-5'-P-CCNC: 24 U/L (ref 9–39)
BILIRUB SERPL-MCNC: 1 MG/DL (ref 0–1.2)
BUN SERPL-MCNC: 8 MG/DL (ref 6–23)
CALCIUM SERPL-MCNC: 8.4 MG/DL (ref 8.6–10.6)
CHLORIDE SERPL-SCNC: 98 MMOL/L (ref 98–107)
CO2 SERPL-SCNC: 28 MMOL/L (ref 21–32)
CREAT SERPL-MCNC: 1.31 MG/DL (ref 0.5–1.05)
EGFRCR SERPLBLD CKD-EPI 2021: 48 ML/MIN/1.73M*2
ERYTHROCYTE [DISTWIDTH] IN BLOOD BY AUTOMATED COUNT: 13.3 % (ref 11.5–14.5)
GLUCOSE SERPL-MCNC: 90 MG/DL (ref 74–99)
HCT VFR BLD AUTO: 33.1 % (ref 36–46)
HGB BLD-MCNC: 10.5 G/DL (ref 12–16)
MAGNESIUM SERPL-MCNC: 2.21 MG/DL (ref 1.6–2.4)
MCH RBC QN AUTO: 27.1 PG (ref 26–34)
MCHC RBC AUTO-ENTMCNC: 31.7 G/DL (ref 32–36)
MCV RBC AUTO: 86 FL (ref 80–100)
NRBC BLD-RTO: 0 /100 WBCS (ref 0–0)
PLATELET # BLD AUTO: 368 X10*3/UL (ref 150–450)
POTASSIUM SERPL-SCNC: 3.9 MMOL/L (ref 3.5–5.3)
PROT SERPL-MCNC: 6.1 G/DL (ref 6.4–8.2)
RBC # BLD AUTO: 3.87 X10*6/UL (ref 4–5.2)
SODIUM SERPL-SCNC: 137 MMOL/L (ref 136–145)
WBC # BLD AUTO: 11.2 X10*3/UL (ref 4.4–11.3)

## 2025-06-02 PROCEDURE — 36415 COLL VENOUS BLD VENIPUNCTURE: CPT

## 2025-06-02 PROCEDURE — 84075 ASSAY ALKALINE PHOSPHATASE: CPT

## 2025-06-02 PROCEDURE — 2500000002 HC RX 250 W HCPCS SELF ADMINISTERED DRUGS (ALT 637 FOR MEDICARE OP, ALT 636 FOR OP/ED)

## 2025-06-02 PROCEDURE — 2500000001 HC RX 250 WO HCPCS SELF ADMINISTERED DRUGS (ALT 637 FOR MEDICARE OP): Performed by: NURSE PRACTITIONER

## 2025-06-02 PROCEDURE — 2500000001 HC RX 250 WO HCPCS SELF ADMINISTERED DRUGS (ALT 637 FOR MEDICARE OP)

## 2025-06-02 PROCEDURE — 2500000004 HC RX 250 GENERAL PHARMACY W/ HCPCS (ALT 636 FOR OP/ED)

## 2025-06-02 PROCEDURE — 1170000001 HC PRIVATE ONCOLOGY ROOM DAILY

## 2025-06-02 PROCEDURE — 2500000004 HC RX 250 GENERAL PHARMACY W/ HCPCS (ALT 636 FOR OP/ED): Mod: JZ

## 2025-06-02 PROCEDURE — 2500000004 HC RX 250 GENERAL PHARMACY W/ HCPCS (ALT 636 FOR OP/ED): Performed by: NURSE PRACTITIONER

## 2025-06-02 PROCEDURE — 83735 ASSAY OF MAGNESIUM: CPT

## 2025-06-02 PROCEDURE — 85027 COMPLETE CBC AUTOMATED: CPT

## 2025-06-02 RX ORDER — SODIUM CHLORIDE, SODIUM LACTATE, POTASSIUM CHLORIDE, CALCIUM CHLORIDE 600; 310; 30; 20 MG/100ML; MG/100ML; MG/100ML; MG/100ML
100 INJECTION, SOLUTION INTRAVENOUS CONTINUOUS
Status: DISCONTINUED | OUTPATIENT
Start: 2025-06-02 | End: 2025-06-03

## 2025-06-02 RX ORDER — BISACODYL 10 MG/1
10 SUPPOSITORY RECTAL ONCE
Status: COMPLETED | OUTPATIENT
Start: 2025-06-02 | End: 2025-06-02

## 2025-06-02 RX ORDER — CYCLOBENZAPRINE HCL 10 MG
5 TABLET ORAL 3 TIMES DAILY
Status: DISCONTINUED | OUTPATIENT
Start: 2025-06-02 | End: 2025-06-04 | Stop reason: HOSPADM

## 2025-06-02 RX ORDER — SODIUM CHLORIDE, SODIUM LACTATE, POTASSIUM CHLORIDE, CALCIUM CHLORIDE 600; 310; 30; 20 MG/100ML; MG/100ML; MG/100ML; MG/100ML
50 INJECTION, SOLUTION INTRAVENOUS CONTINUOUS
Status: DISCONTINUED | OUTPATIENT
Start: 2025-06-02 | End: 2025-06-04

## 2025-06-02 RX ADMIN — HEPARIN SODIUM 5000 UNITS: 5000 INJECTION, SOLUTION INTRAVENOUS; SUBCUTANEOUS at 01:33

## 2025-06-02 RX ADMIN — BUPROPION HYDROCHLORIDE 150 MG: 150 TABLET, EXTENDED RELEASE ORAL at 09:15

## 2025-06-02 RX ADMIN — ROSUVASTATIN CALCIUM 40 MG: 40 TABLET, FILM COATED ORAL at 20:31

## 2025-06-02 RX ADMIN — CALCIUM CARBONATE (ANTACID) CHEW TAB 500 MG 1 TABLET: 500 CHEW TAB at 09:15

## 2025-06-02 RX ADMIN — HEPARIN SODIUM 5000 UNITS: 5000 INJECTION, SOLUTION INTRAVENOUS; SUBCUTANEOUS at 17:47

## 2025-06-02 RX ADMIN — CYCLOBENZAPRINE 5 MG: 10 TABLET, FILM COATED ORAL at 09:15

## 2025-06-02 RX ADMIN — CYCLOBENZAPRINE 5 MG: 10 TABLET, FILM COATED ORAL at 16:22

## 2025-06-02 RX ADMIN — LEVOTHYROXINE SODIUM 100 MCG: 100 TABLET ORAL at 06:05

## 2025-06-02 RX ADMIN — SENNOSIDES AND DOCUSATE SODIUM 1 TABLET: 50; 8.6 TABLET ORAL at 20:31

## 2025-06-02 RX ADMIN — SODIUM CHLORIDE, SODIUM LACTATE, POTASSIUM CHLORIDE, AND CALCIUM CHLORIDE 100 ML/HR: .6; .31; .03; .02 INJECTION, SOLUTION INTRAVENOUS at 09:15

## 2025-06-02 RX ADMIN — EZETIMIBE 10 MG: 10 TABLET ORAL at 20:31

## 2025-06-02 RX ADMIN — ONDANSETRON 4 MG: 2 INJECTION INTRAMUSCULAR; INTRAVENOUS at 02:40

## 2025-06-02 RX ADMIN — CITALOPRAM HYDROBROMIDE 40 MG: 40 TABLET ORAL at 20:31

## 2025-06-02 RX ADMIN — PANTOPRAZOLE SODIUM 40 MG: 40 TABLET, DELAYED RELEASE ORAL at 09:15

## 2025-06-02 RX ADMIN — BISACODYL 10 MG: 10 SUPPOSITORY RECTAL at 09:15

## 2025-06-02 RX ADMIN — FOLIC ACID 1 MG: 1 TABLET ORAL at 09:15

## 2025-06-02 RX ADMIN — SODIUM CHLORIDE, SODIUM LACTATE, POTASSIUM CHLORIDE, AND CALCIUM CHLORIDE 100 ML/HR: .6; .31; .03; .02 INJECTION, SOLUTION INTRAVENOUS at 16:23

## 2025-06-02 RX ADMIN — PANTOPRAZOLE SODIUM 40 MG: 40 TABLET, DELAYED RELEASE ORAL at 20:32

## 2025-06-02 RX ADMIN — CYCLOBENZAPRINE 5 MG: 10 TABLET, FILM COATED ORAL at 20:31

## 2025-06-02 RX ADMIN — ACETAMINOPHEN 650 MG: 325 TABLET ORAL at 04:03

## 2025-06-02 RX ADMIN — OXYCODONE HYDROCHLORIDE 5 MG: 5 TABLET ORAL at 03:12

## 2025-06-02 RX ADMIN — HEPARIN SODIUM 5000 UNITS: 5000 INJECTION, SOLUTION INTRAVENOUS; SUBCUTANEOUS at 10:38

## 2025-06-02 ASSESSMENT — COGNITIVE AND FUNCTIONAL STATUS - GENERAL
CLIMB 3 TO 5 STEPS WITH RAILING: A LITTLE
EATING MEALS: A LITTLE
EATING MEALS: A LITTLE
MOVING FROM LYING ON BACK TO SITTING ON SIDE OF FLAT BED WITH BEDRAILS: A LITTLE
DAILY ACTIVITIY SCORE: 18
DRESSING REGULAR LOWER BODY CLOTHING: A LITTLE
DRESSING REGULAR UPPER BODY CLOTHING: A LITTLE
WALKING IN HOSPITAL ROOM: A LITTLE
PERSONAL GROOMING: A LITTLE
CLIMB 3 TO 5 STEPS WITH RAILING: A LITTLE
DRESSING REGULAR UPPER BODY CLOTHING: A LITTLE
MOVING TO AND FROM BED TO CHAIR: A LITTLE
PERSONAL GROOMING: A LITTLE
HELP NEEDED FOR BATHING: A LITTLE
TOILETING: A LITTLE
MOVING FROM LYING ON BACK TO SITTING ON SIDE OF FLAT BED WITH BEDRAILS: A LITTLE
WALKING IN HOSPITAL ROOM: A LITTLE
STANDING UP FROM CHAIR USING ARMS: A LITTLE
TOILETING: A LITTLE
MOBILITY SCORE: 18
DRESSING REGULAR LOWER BODY CLOTHING: A LITTLE
MOVING TO AND FROM BED TO CHAIR: A LITTLE
HELP NEEDED FOR BATHING: A LITTLE
STANDING UP FROM CHAIR USING ARMS: A LITTLE
DAILY ACTIVITIY SCORE: 18
TURNING FROM BACK TO SIDE WHILE IN FLAT BAD: A LITTLE
MOBILITY SCORE: 18
TURNING FROM BACK TO SIDE WHILE IN FLAT BAD: A LITTLE

## 2025-06-02 ASSESSMENT — PAIN - FUNCTIONAL ASSESSMENT
PAIN_FUNCTIONAL_ASSESSMENT: 0-10
PAIN_FUNCTIONAL_ASSESSMENT: 0-10

## 2025-06-02 ASSESSMENT — PAIN SCALES - GENERAL
PAINLEVEL_OUTOF10: 4
PAINLEVEL_OUTOF10: 7
PAINLEVEL_OUTOF10: 0 - NO PAIN
PAINLEVEL_OUTOF10: 4

## 2025-06-02 ASSESSMENT — PAIN DESCRIPTION - LOCATION: LOCATION: ABDOMEN

## 2025-06-02 NOTE — PROGRESS NOTES
05/29/25 1200   Discharge Planning   Living Arrangements Spouse/significant other   Support Systems Spouse/significant other;Children   Assistance Needed none   Type of Residence Private residence   Who is requesting discharge planning? Provider   Home or Post Acute Services Other (Comment)  (TBD)   Expected Discharge Disposition Home   Does the patient need discharge transport arranged? 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 ADOD 6/1, dispo TBD HNN vs low intensity.     Care Transitions Note 5/29 @ 12:45pm     Plan per Medical/Surgical Team: S/P sarcoma resection  Status: Inpatient   Payor Source: [place]   Discharge disposition: TBD; HNN vs low intensity pending PT recommendations  Expected date of discharge: 6/1/25  Barriers: none   PCP / Primary Oncologist: Star Null DO  Preferred Pharmacy: [facility]   Preferred home care agency: [facility]    Met with patient,  and daughter at bedside, introduced self and role. Demographics and insurance verifed with patient. Patient lives at home with her  and 3/5 children. No DME. Patient reports she has an inspire device in her chest (implantable CPAP device). Patient reports family will transport her home at discharge. Not active with home care on admission. PT/OT pending, as they were unable to work with patient this AM due to dizziness. Will continue to follow for any discharge planning needs. Pita Davis RN TCC    6/2/25 @ 12:17pm     Per medical team, patient ADOD 6/4 with [facility]-PT. May need SN if smith is unable to be removed, plan for trial of void today. TCC followed up with [facility] to be delivered to patients home once  returns phone call to DME company to confirm. TCC relayed above message to patient who verbalized understanding. Will continue to follow. Pita Davis RN  TCC

## 2025-06-02 NOTE — PROGRESS NOTES
[facility]  DEPARTMENT OF SURGERY    SURGICAL ONCOLOGY  PROGRESS NOTE    SUBJECTIVE  Episode of emesis ovn, made NPO and started on LR @ 100. Given tums for indigestion prior to this around 8:30pm. Patient at that time denied any CP or SOB. This morning, patient reports continued burping with minimal flatus stating she feels more bloated. Still having minimal N/V.    OBJECTIVE  Vitals:  Temp:  [36.5 °C (97.7 °F)-37.3 °C (99.1 °F)] 37.2 °C (99 °F)  Heart Rate:  [63-78] 78  Resp:  [16-18] 18  BP: (121-146)/(69-81) 126/69    I/Os:  I/O last 3 completed shifts:  In: 972.5 (12.1 mL/kg) [P.O.:550; I.V.:322.5 (4 mL/kg); IV Piggyback:100]  Out: 1945 (24.3 mL/kg) [Urine:1295 (0.4 mL/kg/hr); Emesis/NG output:650]  Weight: 80.1 kg     Exam  Gen: NAD  Card: Regular rate  Resp: Non labored breathing on 1L NC  Abd: Soft, moderately distended, appropriately tender to palpation for postoperative course along midline incision. Incision c/d/I. Staples in place without evidence of drainage, well approximated   : Melendez in place, CYU  Ext: No evidence of lower extremity swelling  Neuro: Alert and conversant    Labs:  CBC:   Lab Results   Component Value Date    WBC 11.2 06/02/2025    RBC 3.87 (L) 06/02/2025     BMP:   Lab Results   Component Value Date    GLUCOSE 90 06/02/2025    CO2 28 06/02/2025    BUN 8 06/02/2025    CREATININE 1.31 (H) 06/02/2025    CALCIUM 8.4 (L) 06/02/2025     Imaging:  No new imaging.    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 Melendez replaced 5/30 for urinary retention.     PLAN:  - Continue PO scheduled tylenol, PRN oxycodone and tramadol  - Start flexeril (not metabolized through kidneys) for muscle spasms  - Home wellbutrin, citalopram  - Incentive spirometry, home Breo-Ellipta  - Wean oxygen, only on 1L NC saturating  well  - Home ezetimibe, crestor  - Continue holding amlodipine and chlorthalidone  - NPO iso recent emesis  - Low threshold for NGT placement  - LR @ 100  - Remove smith, TOV  - Avoid nephrotoxic medications  - Home synthroid  - PT, OOB  - SCDs, SQH    Patient discussed with attending, Dr. Villa.    Pita Thompson MD  PGY 1 Surg Onc  [facility] [phone]

## 2025-06-02 NOTE — CARE PLAN
Problem: Pain - Adult  Goal: Verbalizes/displays adequate comfort level or baseline comfort level  Outcome: Progressing     Problem: Safety - Adult  Goal: Free from fall injury  Outcome: Progressing     Problem: Discharge Planning  Goal: Discharge to home or other facility with appropriate resources  Outcome: Progressing     Problem: Chronic Conditions and Co-morbidities  Goal: Patient's chronic conditions and co-morbidity symptoms are monitored and maintained or improved  Outcome: Progressing     Problem: Nutrition  Goal: Nutrient intake appropriate for maintaining nutritional needs  Outcome: Progressing   The patient's goals for the shift include      The clinical goals for the shift include pt jonelle be able to void

## 2025-06-02 NOTE — PROGRESS NOTES
Spiritual Care Visit  Spiritual Care Request    Reason for Visit:   Spiritual and emotional support             Care Provided:    Patient reports successful surgery with her second bout of cancer.  Patient reports 5 children, all of whom are supportive and represent her primary source of hope.  Patient general temperament is to try and remain positive.  Prayer with patient and family.       Sense of Community and or Latter day Affiliation:  Hinduism

## 2025-06-03 LAB
ALBUMIN SERPL BCP-MCNC: 3.4 G/DL (ref 3.4–5)
ALP SERPL-CCNC: 165 U/L (ref 33–110)
ALT SERPL W P-5'-P-CCNC: 22 U/L (ref 7–45)
ANION GAP SERPL CALC-SCNC: 20 MMOL/L (ref 10–20)
AST SERPL W P-5'-P-CCNC: 27 U/L (ref 9–39)
BILIRUB SERPL-MCNC: 1.1 MG/DL (ref 0–1.2)
BUN SERPL-MCNC: 8 MG/DL (ref 6–23)
CALCIUM SERPL-MCNC: 8 MG/DL (ref 8.6–10.6)
CHLORIDE SERPL-SCNC: 98 MMOL/L (ref 98–107)
CO2 SERPL-SCNC: 23 MMOL/L (ref 21–32)
CREAT SERPL-MCNC: 1.29 MG/DL (ref 0.5–1.05)
EGFRCR SERPLBLD CKD-EPI 2021: 49 ML/MIN/1.73M*2
ERYTHROCYTE [DISTWIDTH] IN BLOOD BY AUTOMATED COUNT: 13.1 % (ref 11.5–14.5)
GLUCOSE SERPL-MCNC: 68 MG/DL (ref 74–99)
HCT VFR BLD AUTO: 29.8 % (ref 36–46)
HGB BLD-MCNC: 9.6 G/DL (ref 12–16)
MCH RBC QN AUTO: 27 PG (ref 26–34)
MCHC RBC AUTO-ENTMCNC: 32.2 G/DL (ref 32–36)
MCV RBC AUTO: 84 FL (ref 80–100)
NRBC BLD-RTO: 0 /100 WBCS (ref 0–0)
PLATELET # BLD AUTO: 355 X10*3/UL (ref 150–450)
POTASSIUM SERPL-SCNC: 3.6 MMOL/L (ref 3.5–5.3)
PROT SERPL-MCNC: 5.7 G/DL (ref 6.4–8.2)
RBC # BLD AUTO: 3.56 X10*6/UL (ref 4–5.2)
SODIUM SERPL-SCNC: 137 MMOL/L (ref 136–145)
WBC # BLD AUTO: 11.5 X10*3/UL (ref 4.4–11.3)

## 2025-06-03 PROCEDURE — 80053 COMPREHEN METABOLIC PANEL: CPT

## 2025-06-03 PROCEDURE — 36415 COLL VENOUS BLD VENIPUNCTURE: CPT

## 2025-06-03 PROCEDURE — 2500000001 HC RX 250 WO HCPCS SELF ADMINISTERED DRUGS (ALT 637 FOR MEDICARE OP)

## 2025-06-03 PROCEDURE — 97116 GAIT TRAINING THERAPY: CPT | Mod: GP,CQ

## 2025-06-03 PROCEDURE — 85027 COMPLETE CBC AUTOMATED: CPT

## 2025-06-03 PROCEDURE — 2500000001 HC RX 250 WO HCPCS SELF ADMINISTERED DRUGS (ALT 637 FOR MEDICARE OP): Performed by: STUDENT IN AN ORGANIZED HEALTH CARE EDUCATION/TRAINING PROGRAM

## 2025-06-03 PROCEDURE — 2500000004 HC RX 250 GENERAL PHARMACY W/ HCPCS (ALT 636 FOR OP/ED): Performed by: NURSE PRACTITIONER

## 2025-06-03 PROCEDURE — 1170000001 HC PRIVATE ONCOLOGY ROOM DAILY

## 2025-06-03 PROCEDURE — 2500000004 HC RX 250 GENERAL PHARMACY W/ HCPCS (ALT 636 FOR OP/ED)

## 2025-06-03 PROCEDURE — 94640 AIRWAY INHALATION TREATMENT: CPT

## 2025-06-03 PROCEDURE — 2500000001 HC RX 250 WO HCPCS SELF ADMINISTERED DRUGS (ALT 637 FOR MEDICARE OP): Performed by: NURSE PRACTITIONER

## 2025-06-03 PROCEDURE — 2500000002 HC RX 250 W HCPCS SELF ADMINISTERED DRUGS (ALT 637 FOR MEDICARE OP, ALT 636 FOR OP/ED)

## 2025-06-03 PROCEDURE — 97110 THERAPEUTIC EXERCISES: CPT | Mod: GP,CQ

## 2025-06-03 RX ORDER — POTASSIUM CHLORIDE 14.9 MG/ML
20 INJECTION INTRAVENOUS
Status: COMPLETED | OUTPATIENT
Start: 2025-06-03 | End: 2025-06-03

## 2025-06-03 RX ADMIN — CYCLOBENZAPRINE 5 MG: 10 TABLET, FILM COATED ORAL at 20:01

## 2025-06-03 RX ADMIN — ONDANSETRON 4 MG: 2 INJECTION INTRAMUSCULAR; INTRAVENOUS at 20:05

## 2025-06-03 RX ADMIN — EZETIMIBE 10 MG: 10 TABLET ORAL at 20:01

## 2025-06-03 RX ADMIN — OXYCODONE HYDROCHLORIDE 5 MG: 5 TABLET ORAL at 13:19

## 2025-06-03 RX ADMIN — ROSUVASTATIN CALCIUM 40 MG: 40 TABLET, FILM COATED ORAL at 20:01

## 2025-06-03 RX ADMIN — CYCLOBENZAPRINE 5 MG: 10 TABLET, FILM COATED ORAL at 08:11

## 2025-06-03 RX ADMIN — HEPARIN SODIUM 5000 UNITS: 5000 INJECTION, SOLUTION INTRAVENOUS; SUBCUTANEOUS at 10:30

## 2025-06-03 RX ADMIN — TRAMADOL HYDROCHLORIDE 50 MG: 50 TABLET, COATED ORAL at 08:19

## 2025-06-03 RX ADMIN — SENNOSIDES AND DOCUSATE SODIUM 1 TABLET: 50; 8.6 TABLET ORAL at 20:01

## 2025-06-03 RX ADMIN — CITALOPRAM HYDROBROMIDE 40 MG: 40 TABLET ORAL at 20:01

## 2025-06-03 RX ADMIN — POTASSIUM CHLORIDE 20 MEQ: 14.9 INJECTION, SOLUTION INTRAVENOUS at 10:30

## 2025-06-03 RX ADMIN — AMLODIPINE BESYLATE 5 MG: 5 TABLET ORAL at 08:11

## 2025-06-03 RX ADMIN — PANTOPRAZOLE SODIUM 40 MG: 40 TABLET, DELAYED RELEASE ORAL at 08:11

## 2025-06-03 RX ADMIN — TRAMADOL HYDROCHLORIDE 50 MG: 50 TABLET, COATED ORAL at 20:05

## 2025-06-03 RX ADMIN — POTASSIUM CHLORIDE 20 MEQ: 14.9 INJECTION, SOLUTION INTRAVENOUS at 08:11

## 2025-06-03 RX ADMIN — CYCLOBENZAPRINE 5 MG: 10 TABLET, FILM COATED ORAL at 15:19

## 2025-06-03 RX ADMIN — FLUTICASONE FUROATE AND VILANTEROL TRIFENATATE 1 PUFF: 200; 25 POWDER RESPIRATORY (INHALATION) at 09:30

## 2025-06-03 RX ADMIN — LEVOTHYROXINE SODIUM 100 MCG: 100 TABLET ORAL at 06:11

## 2025-06-03 RX ADMIN — HEPARIN SODIUM 5000 UNITS: 5000 INJECTION, SOLUTION INTRAVENOUS; SUBCUTANEOUS at 18:26

## 2025-06-03 RX ADMIN — HEPARIN SODIUM 5000 UNITS: 5000 INJECTION, SOLUTION INTRAVENOUS; SUBCUTANEOUS at 02:24

## 2025-06-03 RX ADMIN — BUPROPION HYDROCHLORIDE 150 MG: 150 TABLET, EXTENDED RELEASE ORAL at 08:10

## 2025-06-03 RX ADMIN — SODIUM CHLORIDE, SODIUM LACTATE, POTASSIUM CHLORIDE, AND CALCIUM CHLORIDE 100 ML/HR: .6; .31; .03; .02 INJECTION, SOLUTION INTRAVENOUS at 03:26

## 2025-06-03 RX ADMIN — PANTOPRAZOLE SODIUM 40 MG: 40 TABLET, DELAYED RELEASE ORAL at 20:01

## 2025-06-03 RX ADMIN — FOLIC ACID 1 MG: 1 TABLET ORAL at 08:11

## 2025-06-03 RX ADMIN — CALCIUM CARBONATE (ANTACID) CHEW TAB 500 MG 1 TABLET: 500 CHEW TAB at 08:11

## 2025-06-03 ASSESSMENT — COGNITIVE AND FUNCTIONAL STATUS - GENERAL
WALKING IN HOSPITAL ROOM: A LITTLE
TOILETING: A LITTLE
WALKING IN HOSPITAL ROOM: A LITTLE
STANDING UP FROM CHAIR USING ARMS: A LITTLE
DAILY ACTIVITIY SCORE: 20
HELP NEEDED FOR BATHING: A LITTLE
CLIMB 3 TO 5 STEPS WITH RAILING: A LOT
TURNING FROM BACK TO SIDE WHILE IN FLAT BAD: A LITTLE
MOVING TO AND FROM BED TO CHAIR: A LITTLE
MOBILITY SCORE: 17
DRESSING REGULAR UPPER BODY CLOTHING: A LITTLE
TURNING FROM BACK TO SIDE WHILE IN FLAT BAD: A LITTLE
DRESSING REGULAR LOWER BODY CLOTHING: A LITTLE
HELP NEEDED FOR BATHING: A LITTLE
STANDING UP FROM CHAIR USING ARMS: A LITTLE
CLIMB 3 TO 5 STEPS WITH RAILING: A LITTLE
DRESSING REGULAR LOWER BODY CLOTHING: A LITTLE
TURNING FROM BACK TO SIDE WHILE IN FLAT BAD: A LITTLE
CLIMB 3 TO 5 STEPS WITH RAILING: A LITTLE
MOVING TO AND FROM BED TO CHAIR: A LITTLE
TOILETING: A LITTLE
MOVING TO AND FROM BED TO CHAIR: A LITTLE
MOBILITY SCORE: 19
STANDING UP FROM CHAIR USING ARMS: A LITTLE
MOVING FROM LYING ON BACK TO SITTING ON SIDE OF FLAT BED WITH BEDRAILS: A LITTLE
WALKING IN HOSPITAL ROOM: A LITTLE
DAILY ACTIVITIY SCORE: 20
MOBILITY SCORE: 19
DRESSING REGULAR UPPER BODY CLOTHING: A LITTLE

## 2025-06-03 ASSESSMENT — PAIN SCALES - GENERAL
PAINLEVEL_OUTOF10: 0 - NO PAIN
PAINLEVEL_OUTOF10: 3
PAINLEVEL_OUTOF10: 7
PAINLEVEL_OUTOF10: 6
PAINLEVEL_OUTOF10: 4
PAINLEVEL_OUTOF10: 5 - MODERATE PAIN

## 2025-06-03 ASSESSMENT — PAIN - FUNCTIONAL ASSESSMENT
PAIN_FUNCTIONAL_ASSESSMENT: 0-10

## 2025-06-03 NOTE — PROGRESS NOTES
Physical Therapy    Physical Therapy Treatment    Patient Name: Winter Feliz  MRN: [id number]  Department: [facility]  Room: [address] [place] [address]  Today's Date: 6/3/2025  Time Calculation  Start Time: 1229  Stop Time: 1307  Time Calculation (min): 38 min         Assessment/Plan   PT Assessment  PT Assessment Results: Decreased strength, Decreased mobility, Impaired balance  Rehab Prognosis: Good  Barriers to Discharge Home: No anticipated barriers  End of Session Communication: Bedside nurse  Assessment Comment: Pt. tolerated amb. better this session without complaints of lightheadedness however slightly pain limited. Pt. was due for pain meds- Called out for RN to issue. Pt. has been recommended for low intensive therapy post acute.  End of Session Patient Position: Up in chair, Alarm off, not on at start of session  PT Plan  Inpatient/Swing Bed or Outpatient: Inpatient  PT Plan  Treatment/Interventions: Bed mobility, Transfer training, Gait training, Therapeutic exercise  PT Plan: Ongoing PT  PT Frequency: 3 times per week  PT Discharge Recommendations: Low intensity level of continued care  Equipment Recommended upon Discharge:  (WW in room.)  PT Recommended Transfer Status: Contact guard, Assistive device  PT - OK to Discharge: Yes    PT Visit Info:  PT Received On: 06/03/25     General Visit Information:   General  Reason for Referral: 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  Past Medical History Relevant to Rehab: asthma, HLD, HTN, IGGY, with R RP liposarcoma  Prior to Session Communication: Bedside nurse  Patient Position Received: Bed, 3 rail up, Alarm on    Subjective   Precautions:  Precautions  Medical Precautions: Fall precautions  Post-Surgical Precautions: Abdominal surgery precautions     Date/Time Vitals Session Patient Position Pulse Resp SpO2 BP MAP (mmHg)    06/03/25 1215 --  --  82  18  91 %  130/74  --     06/03/25 1229 --  Lying  --  --  90 %  --  --     06/03/25 1230 --  Ambulating   --  --  93 %  --  --                 Objective   Pain:  Pain Assessment  Pain Assessment: 0-10  0-10 (Numeric) Pain Score: 6  Pain Location: Abdomen  Pain Interventions:  (Called out for RN for pain meds.)  Cognition:  Cognition  Overall Cognitive Status: Within Functional Limits  Orientation Level: Oriented X4  Coordination:     Postural Control:  Postural Control  Postural Control: Within Functional Limits  Head Control: Temporarily impaired due to abdomen discomfort/disention  Static Sitting Balance  Static Sitting-Balance Support: Feet supported  Static Sitting-Level of Assistance: Close supervision  Static Sitting-Comment/Number of Minutes: No complaints of dizziness  Static Standing Balance  Static Standing-Balance Support: Bilateral upper extremity supported  Static Standing-Level of Assistance: Contact guard (Light cga/close sba)  Extremity/Trunk Assessments:                      Activity Tolerance:  Activity Tolerance  Endurance: Tolerates 10 - 20 min exercise with multiple rests  Treatments:  Therapeutic Exercise  Therapeutic Exercise Performed: Yes  Therapeutic Exercise Activity 1: Supine bilat le ex AP'S, QS, SAQ'S, HS, AND ABD X 15 REPS.    Bed Mobility  Bed Mobility: Yes  Bed Mobility 1  Bed Mobility 1:  (log roll aware- Reminders however to begin with knees bent - rolling min assist - Sidelying to sit mod assist due to pain  in abdomen)    Ambulation/Gait Training  Ambulation/Gait Training Performed: Yes  Ambulation/Gait Training 1  Surface 1:  (Pt. amb. 24' using a wh. walker and cga- reminders to keep feet inside the walker frame as pt. tends to push it too far fwd.)  Transfers  Transfer: Yes  Transfer 1  Transfer From 1: Bed to  Transfer to 1: Stand  Transfer Level of Assistance 1: Contact guard  Trials/Comments 1: Cues for hand placement and cga to guide.    Outcome Measures:  [facility] Basic Mobility  Turning from your back to your side while in a flat bed without using bedrails: A little  Moving  from lying on your back to sitting on the side of a flat bed without using bedrails: A little  Moving to and from bed to chair (including a wheelchair): A little  Standing up from a chair using your arms (e.g. wheelchair or bedside chair): A little  To walk in hospital room: A little  Climbing 3-5 steps with railing: A lot  Basic Mobility - Total Score: 17    Education Documentation  Precautions, taught by Leyda Rodriges PTA at 6/3/2025  1:22 PM.  Learner: Patient  Readiness: Acceptance  Method: Explanation, Demonstration  Response: Needs Reinforcement  Comment: Reminders for log roll and to avoid twisting    Mobility Training, taught by Leyda Rodriges PTA at 6/3/2025  1:22 PM.  Learner: Patient  Readiness: Acceptance  Method: Explanation, Demonstration  Response: Needs Reinforcement  Comment: Reminders for log roll and to avoid twisting    Education Comments  No comments found.        OP EDUCATION:       Encounter Problems       Encounter Problems (Active)       Balance       independent static/dynamic stance (Progressing)       Start:  05/29/25    Expected End:  06/19/25               Mobility       STG - Patient will ambulate 200 feet with LRD modified independent  (Progressing)       Start:  05/29/25    Expected End:  06/19/25            STG - Patient will ascend and descend 3 stairs with LRD modified independent  (Progressing)       Start:  05/29/25    Expected End:  06/19/25               PT Transfers       STG - Patient will perform bed mobility independent  (Progressing)       Start:  05/29/25    Expected End:  06/19/25            STG - Patient will transfer sit to and from stand with LRD modified independent  (Progressing)       Start:  05/29/25    Expected End:  06/19/25               Pain - Adult

## 2025-06-03 NOTE — PROGRESS NOTES
06/03/25 0900   Discharge Planning   Living Arrangements Spouse/significant other   Support Systems Spouse/significant other   Assistance Needed None   Type of Residence Private residence   Who is requesting discharge planning? Provider   Home or Post Acute Services In home services   Type of Home Care Services Home PT   Expected Discharge Disposition Home H   Does the patient need discharge transport arranged? No     Care Transitions Note    Plan per Medical/Surgical Team: Advanced to CLD, denies nausea, passing gas, had formed BM, smith discontinued  Status: Inpatient   Payor Source: [place]  Discharge disposition: [facility] PT  Expected date of discharge: 6/4/24  Barriers: None  PCP / Primary Oncologist: Star Null DO  Preferred Pharmacy: [facility]  Preferred home care agency: [facility]    PT recommended home PT and wheeled walker. Referral sent to [facility], received approval. Wheeled walker delivered to pt bedside.     Marlee Alfaro RN  Transitional Care Coordinator (TCC)  [phone]

## 2025-06-03 NOTE — PROGRESS NOTES
[facility]  DEPARTMENT OF SURGERY    SURGICAL ONCOLOGY  PROGRESS NOTE    SUBJECTIVE  No acute events overnight. Feels overall better than yesterday. Denies nausea. Passing gas. Had a formed bowel movement.    OBJECTIVE  Vitals:  Temp:  [35.6 °C (96.1 °F)-37.2 °C (98.9 °F)] 35.6 °C (96.1 °F)  Heart Rate:  [70-97] 79  Resp:  [16-18] 18  BP: (119-136)/(73-77) 126/74    I/Os:  I/O last 3 completed shifts:  In: 4416.3 (56.4 mL/kg) [P.O.:210; I.V.:4206.3 (53.7 mL/kg)]  Out: 1445 (18.5 mL/kg) [Urine:1045 (0.4 mL/kg/hr); Emesis/NG output:400]  Weight: 78.3 kg     Exam  Gen: NAD  Card: Regular rate  Resp: Non labored breathing on 1L NC  Abd: Soft, non-distended, appropriately tender to palpation for postoperative course along midline incision. Staples in place without evidence of drainage, well approximated   Ext: No evidence of lower extremity swelling; WWP, LALA  Neuro: AOx3  Skin: Warm and dry    Labs:  CBC:   Lab Results   Component Value Date    WBC 11.5 (H) 06/03/2025    RBC 3.56 (L) 06/03/2025     BMP:   Lab Results   Component Value Date    GLUCOSE 68 (L) 06/03/2025    CO2 23 06/03/2025    BUN 8 06/03/2025    CREATININE 1.29 (H) 06/03/2025    CALCIUM 8.0 (L) 06/03/2025     Imaging:  No new imaging.    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 Melendez replaced 5/30 for urinary retention.     PLAN:  - Continue PO scheduled tylenol, PRN oxycodone and tramadol  - Continue flexeril (not metabolized through kidneys) for muscle spasms  - Home wellbutrin, citalopram  - Incentive spirometry, home Breo-Ellipta  - Wean oxygen  - Home ezetimibe, crestor  - Resume home amlodipine; continue holding chlorthalidone  - Advance to CLD  - Decrease IVF to 50 ml/hr  - Record UOP  - Avoid nephrotoxic medications  - Home synthroid  - PT, OOB  - SCDs,  SQH  - will need homegoing DVT ppx    D/w Dr. Daisy Tolbert MD  General Surgery PGY5  Surgical Oncology - [facility] [id number]

## 2025-06-04 ENCOUNTER — APPOINTMENT (OUTPATIENT)
Dept: RADIOLOGY | Facility: HOSPITAL | Age: 55
End: 2025-06-04
Payer: COMMERCIAL

## 2025-06-04 ENCOUNTER — DOCUMENTATION (OUTPATIENT)
Dept: HOME HEALTH SERVICES | Facility: HOME HEALTH | Age: 55
End: 2025-06-04
Payer: COMMERCIAL

## 2025-06-04 ENCOUNTER — PHARMACY VISIT (OUTPATIENT)
Dept: PHARMACY | Facility: CLINIC | Age: 55
End: 2025-06-04
Payer: MEDICARE

## 2025-06-04 VITALS
DIASTOLIC BLOOD PRESSURE: 72 MMHG | TEMPERATURE: 98.6 F | RESPIRATION RATE: 18 BRPM | BODY MASS INDEX: 28.71 KG/M2 | HEART RATE: 78 BPM | SYSTOLIC BLOOD PRESSURE: 112 MMHG | HEIGHT: 64 IN | WEIGHT: 168.2 LBS | OXYGEN SATURATION: 93 %

## 2025-06-04 LAB
ACID FAST STN SPEC: NORMAL
ALBUMIN SERPL BCP-MCNC: 3.4 G/DL (ref 3.4–5)
ANION GAP SERPL CALC-SCNC: 13 MMOL/L (ref 10–20)
BUN SERPL-MCNC: 5 MG/DL (ref 6–23)
CALCIUM SERPL-MCNC: 8.2 MG/DL (ref 8.6–10.6)
CHLORIDE SERPL-SCNC: 99 MMOL/L (ref 98–107)
CO2 SERPL-SCNC: 31 MMOL/L (ref 21–32)
CREAT SERPL-MCNC: 1.32 MG/DL (ref 0.5–1.05)
EGFRCR SERPLBLD CKD-EPI 2021: 47 ML/MIN/1.73M*2
ERYTHROCYTE [DISTWIDTH] IN BLOOD BY AUTOMATED COUNT: 13.2 % (ref 11.5–14.5)
GLUCOSE SERPL-MCNC: 110 MG/DL (ref 74–99)
HCT VFR BLD AUTO: 29.7 % (ref 36–46)
HGB BLD-MCNC: 9.7 G/DL (ref 12–16)
MAGNESIUM SERPL-MCNC: 2.13 MG/DL (ref 1.6–2.4)
MCH RBC QN AUTO: 27.4 PG (ref 26–34)
MCHC RBC AUTO-ENTMCNC: 32.7 G/DL (ref 32–36)
MCV RBC AUTO: 84 FL (ref 80–100)
MYCOBACTERIUM SPEC CULT: NORMAL
NRBC BLD-RTO: 0 /100 WBCS (ref 0–0)
PHOSPHATE SERPL-MCNC: 3.3 MG/DL (ref 2.5–4.9)
PLATELET # BLD AUTO: 398 X10*3/UL (ref 150–450)
POTASSIUM SERPL-SCNC: 3.6 MMOL/L (ref 3.5–5.3)
RBC # BLD AUTO: 3.54 X10*6/UL (ref 4–5.2)
SODIUM SERPL-SCNC: 139 MMOL/L (ref 136–145)
WBC # BLD AUTO: 11.9 X10*3/UL (ref 4.4–11.3)

## 2025-06-04 PROCEDURE — 2500000001 HC RX 250 WO HCPCS SELF ADMINISTERED DRUGS (ALT 637 FOR MEDICARE OP): Performed by: STUDENT IN AN ORGANIZED HEALTH CARE EDUCATION/TRAINING PROGRAM

## 2025-06-04 PROCEDURE — 2500000001 HC RX 250 WO HCPCS SELF ADMINISTERED DRUGS (ALT 637 FOR MEDICARE OP)

## 2025-06-04 PROCEDURE — 84100 ASSAY OF PHOSPHORUS: CPT

## 2025-06-04 PROCEDURE — 2500000004 HC RX 250 GENERAL PHARMACY W/ HCPCS (ALT 636 FOR OP/ED): Performed by: NURSE PRACTITIONER

## 2025-06-04 PROCEDURE — 71045 X-RAY EXAM CHEST 1 VIEW: CPT

## 2025-06-04 PROCEDURE — RXMED WILLOW AMBULATORY MEDICATION CHARGE

## 2025-06-04 PROCEDURE — 85027 COMPLETE CBC AUTOMATED: CPT

## 2025-06-04 PROCEDURE — 36415 COLL VENOUS BLD VENIPUNCTURE: CPT

## 2025-06-04 PROCEDURE — 2500000001 HC RX 250 WO HCPCS SELF ADMINISTERED DRUGS (ALT 637 FOR MEDICARE OP): Performed by: NURSE PRACTITIONER

## 2025-06-04 PROCEDURE — 2500000004 HC RX 250 GENERAL PHARMACY W/ HCPCS (ALT 636 FOR OP/ED)

## 2025-06-04 PROCEDURE — 2500000002 HC RX 250 W HCPCS SELF ADMINISTERED DRUGS (ALT 637 FOR MEDICARE OP, ALT 636 FOR OP/ED)

## 2025-06-04 PROCEDURE — 99024 POSTOP FOLLOW-UP VISIT: CPT | Performed by: SURGERY

## 2025-06-04 PROCEDURE — 83735 ASSAY OF MAGNESIUM: CPT

## 2025-06-04 PROCEDURE — 94640 AIRWAY INHALATION TREATMENT: CPT

## 2025-06-04 RX ORDER — TRAMADOL HYDROCHLORIDE 50 MG/1
50 TABLET, FILM COATED ORAL EVERY 6 HOURS PRN
Qty: 28 TABLET | Refills: 0 | Status: SHIPPED | OUTPATIENT
Start: 2025-06-04 | End: 2025-06-11

## 2025-06-04 RX ORDER — ENOXAPARIN SODIUM 100 MG/ML
40 INJECTION SUBCUTANEOUS DAILY
Status: DISCONTINUED | OUTPATIENT
Start: 2025-06-04 | End: 2025-06-04 | Stop reason: HOSPADM

## 2025-06-04 RX ORDER — ENOXAPARIN SODIUM 100 MG/ML
40 INJECTION SUBCUTANEOUS DAILY
Qty: 21 EACH | Refills: 0 | Status: SHIPPED | OUTPATIENT
Start: 2025-06-04 | End: 2025-06-04

## 2025-06-04 RX ORDER — ENOXAPARIN SODIUM 100 MG/ML
40 INJECTION SUBCUTANEOUS DAILY
Qty: 8.4 ML | Refills: 0 | Status: SHIPPED | OUTPATIENT
Start: 2025-06-04

## 2025-06-04 RX ADMIN — PANTOPRAZOLE SODIUM 40 MG: 40 TABLET, DELAYED RELEASE ORAL at 08:47

## 2025-06-04 RX ADMIN — ENOXAPARIN SODIUM 40 MG: 100 INJECTION SUBCUTANEOUS at 08:47

## 2025-06-04 RX ADMIN — OXYCODONE HYDROCHLORIDE 5 MG: 5 TABLET ORAL at 05:31

## 2025-06-04 RX ADMIN — BUPROPION HYDROCHLORIDE 150 MG: 150 TABLET, EXTENDED RELEASE ORAL at 08:47

## 2025-06-04 RX ADMIN — CALCIUM CARBONATE (ANTACID) CHEW TAB 500 MG 1 TABLET: 500 CHEW TAB at 08:47

## 2025-06-04 RX ADMIN — AMLODIPINE BESYLATE 5 MG: 5 TABLET ORAL at 08:47

## 2025-06-04 RX ADMIN — HEPARIN SODIUM 5000 UNITS: 5000 INJECTION, SOLUTION INTRAVENOUS; SUBCUTANEOUS at 02:08

## 2025-06-04 RX ADMIN — FLUTICASONE FUROATE AND VILANTEROL TRIFENATATE 1 PUFF: 200; 25 POWDER RESPIRATORY (INHALATION) at 08:19

## 2025-06-04 RX ADMIN — LEVOTHYROXINE SODIUM 100 MCG: 100 TABLET ORAL at 06:06

## 2025-06-04 RX ADMIN — CYCLOBENZAPRINE 5 MG: 10 TABLET, FILM COATED ORAL at 08:47

## 2025-06-04 RX ADMIN — FOLIC ACID 1 MG: 1 TABLET ORAL at 08:47

## 2025-06-04 ASSESSMENT — COGNITIVE AND FUNCTIONAL STATUS - GENERAL
STANDING UP FROM CHAIR USING ARMS: A LITTLE
MOBILITY SCORE: 19
TURNING FROM BACK TO SIDE WHILE IN FLAT BAD: A LITTLE
DRESSING REGULAR LOWER BODY CLOTHING: A LITTLE
DRESSING REGULAR UPPER BODY CLOTHING: A LITTLE
CLIMB 3 TO 5 STEPS WITH RAILING: A LITTLE
TOILETING: A LITTLE
WALKING IN HOSPITAL ROOM: A LITTLE
DAILY ACTIVITIY SCORE: 20
MOVING TO AND FROM BED TO CHAIR: A LITTLE
HELP NEEDED FOR BATHING: A LITTLE

## 2025-06-04 ASSESSMENT — PAIN SCALES - GENERAL
PAINLEVEL_OUTOF10: 0 - NO PAIN
PAINLEVEL_OUTOF10: 0 - NO PAIN

## 2025-06-04 NOTE — NURSING NOTE
Winter Feliz discharged at 3:41 PM  and 06/04/25   Patient discharged home via private transporation .  Discharge education and teaching completed with Winter Feliz and _husband___. Lovenox teaching provided and demonstrates skill appropriately.

## 2025-06-04 NOTE — DISCHARGE SUMMARY
"Discharge Diagnosis  Sarcoma (Multi)    Test Results Pending At Discharge  Pending Labs       Order Current Status    Surgical Pathology Exam In process    AFB Culture/Smear Preliminary result    Fungal Culture/Smear Preliminary result          Hospital Course  54 yr old female with RP sarcoma who underwent Radical resection of right retroperitoneal sarcoma with right nephrectomy on 5/28 with Dr Villa.  Transferred to [facility] post-op.  Post-op course significant for urinary retention requiring smith replacement (passed 2nd TOV); slow return of GI function; acute respiratory insufficiency requiring O2.  Diet advanced as tolerated with return of bowel function.  IV medication transitioned to oral.  DC home POD 7.    Pertinent Physical Exam At Time of Discharge  Neurological: Awake, alert, conversive  Respiratory/Thorax: even, unlabored, 1L NC  Genitourinary: voiding  Gastrointestinal: soft, NT, ND.  Incision well approximated.  Skin: warm, dry  Musculoskeletal: LALA  Eyes: non-icteric  Extremities: no edema   Psychological: appropriate mood/affect     /72 (BP Location: Right arm, Patient Position: Lying)   Pulse 78   Temp 37 °C (98.6 °F) (Temporal)   Resp 18   Ht 1.626 m (5' 4\")   Wt 76.3 kg (168 lb 3.2 oz)   SpO2 93%   BMI 28.87 kg/m²      Home Medications     Medication List      START taking these medications     acetaminophen 325 mg tablet; Commonly known as: Tylenol; Take 2 tablets   (650 mg) by mouth every 6 hours.   enoxaparin 40 mg/0.4 mL syringe; Commonly known as: Lovenox; Inject 0.4   mL (40 mg) under the skin once daily.   polyethylene glycol 17 gram packet; Commonly known as: Glycolax,   Miralax; Take 17 g by mouth once daily.   sennosides-docusate sodium 8.6-50 mg tablet; Commonly known as:   Selena-Colace; Take 1 tablet by mouth once daily at bedtime.   traMADol 50 mg tablet; Commonly known as: Ultram; Take 1 tablet (50 mg)   by mouth every 6 hours if needed for severe pain (7 - 10) for up to " 7   days.     CHANGE how you take these medications     folic acid 1 mg tablet; Commonly known as: Folvite; TAKE 1 TABLET ORALLY   ONCE A DAY EXCEPT THE DAY OF METHOTREXATE 90 DAYS; What changed: See the   new instructions.     CONTINUE taking these medications     Airsupra 90-80 mcg/actuation inhaler; Generic drug: albuterol-budesonide   amLODIPine 5 mg tablet; Commonly known as: Norvasc   Breo Ellipta 200-25 mcg/dose inhaler; Generic drug: fluticasone   furoate-vilanteroL   buPROPion  mg 24 hr tablet; Commonly known as: Wellbutrin XL   chlorthalidone 25 mg tablet; Commonly known as: [place]; TAKE 1 TAB BY   MOUTH EVERY MORNING WITH FOOD   citalopram 40 mg tablet; Commonly known as: CeleXA   ergocalciferol 1250 mcg (50,000 units) capsule; Commonly known as:   Vitamin D-2; TAKE 1 CAPSULE (02638 UNITS) BY MOUTH ONE TIME PER WEEK   etanercept 50 mg/mL (1 mL) injection; Commonly known as: Enbrel; Inject   1 mL (50 mg) under the skin 1 (one) time per week.   ezetimibe 10 mg tablet; Commonly known as: Zetia; TAKE 1 TABLET BY MOUTH   EVERY DAY   pantoprazole 40 mg EC tablet; Commonly known as: ProtoNix   potassium chloride CR 10 mEq ER tablet; Commonly known as: Klor-Con   rosuvastatin 40 mg tablet; Commonly known as: Crestor; TAKE 1 TABLET BY   MOUTH EVERY DAY   Synthroid 100 mcg tablet; Generic drug: levothyroxine     STOP taking these medications     chlorhexidine 4 % external liquid; Commonly known as: Hibiclens   metroNIDAZOLE 500 mg tablet; Commonly known as: Flagyl   neomycin 500 mg tablet; Commonly known as: Mycifradin   predniSONE 50 mg tablet; Commonly known as: Deltasone   scopolamine 1 mg over 3 days patch 3 day; Commonly known as:   Transderm-Scop       Outpatient Follow-Up  Future Appointments   Date Time Provider Department [facility]   6/10/2025  9:30 AM Enzo Villa MD MPH [id number] [facility]   7/17/2025  1:40 PM Camille Monson MD [id number] None       Pita Villegas, APRN-CNP

## 2025-06-04 NOTE — CARE PLAN
The clinical goals for the shift include pt will remain HDS and free from falls [facility] shift 6/4/25 1900      Problem: Pain - Adult  Goal: Verbalizes/displays adequate comfort level or baseline comfort level  Outcome: Progressing     Problem: Safety - Adult  Goal: Free from fall injury  Outcome: Progressing     Problem: Discharge Planning  Goal: Discharge to home or other facility with appropriate resources  Outcome: Progressing     Problem: Chronic Conditions and Co-morbidities  Goal: Patient's chronic conditions and co-morbidity symptoms are monitored and maintained or improved  Outcome: Progressing     Problem: Nutrition  Goal: Nutrient intake appropriate for maintaining nutritional needs  Outcome: Progressing     Problem: Skin  Goal: Decreased wound size/increased tissue granulation at next dressing change  Outcome: Progressing  Goal: Participates in plan/prevention/treatment measures  Outcome: Progressing  Goal: Prevent/manage excess moisture  Outcome: Progressing  Goal: Prevent/minimize sheer/friction injuries  Outcome: Progressing  Goal: Promote/optimize nutrition  Outcome: Progressing  Goal: Promote skin healing  Outcome: Progressing

## 2025-06-04 NOTE — HH CARE COORDINATION
Home Care received a Referral for Physical Therapy. We have processed the referral for a Start of Care on 06/07-06/08.     If you have any questions or concerns, please feel free to contact us at [phone]. Follow the prompts, enter your five digit zip code, and you will be directed to your care team on E[facility] 1.

## 2025-06-04 NOTE — PROGRESS NOTES
[facility]  DEPARTMENT OF SURGERY    SURGICAL ONCOLOGY  PROGRESS NOTE    SUBJECTIVE  No acute events overnight. Denies nausea. Having appropriate bowel function. Pain better controlled. Still intermittently requiring 1LNC O2.    OBJECTIVE  Vitals:  Temp:  [36.1 °C (97 °F)-37 °C (98.6 °F)] 37 °C (98.6 °F)  Heart Rate:  [73-87] 80  Resp:  [16-18] 18  BP: (118-132)/(70-85) 118/70    I/Os:  I/O last 3 completed shifts:  In: 3868.3 (50.7 mL/kg) [P.O.:760; I.V.:3008.3 (39.4 mL/kg); IV Piggyback:100]  Out: 2900 (38 mL/kg) [Urine:2900 (1.1 mL/kg/hr)]  Weight: 76.3 kg     Exam  Gen: NAD  Card: Regular rate  Resp: Non-labored breathing on 1L NC  Abd: Soft, non-distended, minimally tender to palpation along midline incision. Staples in place without erythema, well approximated   Ext: No evidence of lower extremity swelling; WWP, LALA  Neuro: AOx3  Skin: Warm and dry    Labs:  CBC:   Lab Results   Component Value Date    WBC 11.9 (H) 06/04/2025    RBC 3.54 (L) 06/04/2025     BMP:   Lab Results   Component Value Date    GLUCOSE 110 (H) 06/04/2025    CO2 31 06/04/2025    BUN 5 (L) 06/04/2025    CREATININE 1.32 (H) 06/04/2025    CALCIUM 8.2 (L) 06/04/2025     Imaging:  CXR 6/4: low lung volume but no significant effusion or acute cardiopulmonary process    ASSESSMENT:  55 yo F with asthma, HLD, HTN, IGGY, 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 Melendez replaced 5/30 for urinary retention and subsequently removed with normal urinary function. Course also notable for ileus which resolved with NGT and bowel rest; now tolerating a diet with appropriate bowel function.    PLAN:  - Continue PO scheduled tylenol, PRN oxycodone and tramadol  - Continue flexeril (not metabolized through kidneys) for muscle spasms  - Home wellbutrin, citalopram  - Incentive spirometry, home Breo-Ellipta  - Wean oxygen  -  Home ezetimibe, crestor  - Continue home amlodipine; continue holding chlorthalidone  - Advance to regular diet  - Discontinue IVF  - Record UOP  - Avoid nephrotoxic medications  - Home synthroid  - PT, OOB  - SCDs, SQH  - will need homegoing DVT ppx    D/w Dr. Daisy Tolbert MD  General Surgery PGY5  Surgical Oncology - [facility] [id number]

## 2025-06-04 NOTE — PROGRESS NOTES
06/04/25 1400   Discharge Planning   Living Arrangements Spouse/significant other   Support Systems Spouse/significant other   Type of Residence Private residence   Who is requesting discharge planning? Provider   Home or Post Acute Services In home services   Type of Home Care Services Home PT   Expected Discharge Disposition Home H     TCC Note:    Patient to discharge home today with [facility] PT. Patient also requires home O2, referral sent to [facility]. [facility] approved O2, O2 tank delivered to patient's bedside.  [facility] will contact pt with home delivery.     Marlee Alfaro RN  Transitional Care Coordinator (TCC)  [phone]

## 2025-06-04 NOTE — NURSING NOTE
Walking SPO2% test    SpO2% while resting on RA at 88% and HR 88  SpO2% while exercising on RA 84% and   SpO2%while exercising with supplement O2 2L NC 93% and HR 95  How many liters using while exercising/walking 2L via NC

## 2025-06-07 ENCOUNTER — HOME CARE VISIT (OUTPATIENT)
Dept: HOME HEALTH SERVICES | Facility: HOME HEALTH | Age: 55
End: 2025-06-07
Payer: COMMERCIAL

## 2025-06-07 VITALS
SYSTOLIC BLOOD PRESSURE: 116 MMHG | DIASTOLIC BLOOD PRESSURE: 78 MMHG | TEMPERATURE: 98.6 F | HEART RATE: 80 BPM | OXYGEN SATURATION: 95 %

## 2025-06-07 PROCEDURE — G0151 HHCP-SERV OF PT,EA 15 MIN: HCPCS

## 2025-06-07 SDOH — HEALTH STABILITY: PHYSICAL HEALTH: EXERCISE COMMENTS: SUPINE AP QS GS HEEL SLIDES HIP ABD

## 2025-06-07 SDOH — ECONOMIC STABILITY: HOUSING INSECURITY: EVIDENCE OF SMOKING MATERIAL: 0

## 2025-06-07 SDOH — HEALTH STABILITY: MENTAL HEALTH: SMOKING IN HOME: 0

## 2025-06-07 ASSESSMENT — ENCOUNTER SYMPTOMS
PAIN LOCATION: INCISION
DEPRESSION: 0
LOWEST PAIN SEVERITY IN PAST 24 HOURS: 2/10
PERSON REPORTING PAIN: PATIENT
PAIN: 1
PAIN LOCATION - EXACERBATING FACTORS: WALKING
OCCASIONAL FEELINGS OF UNSTEADINESS: 1
HIGHEST PAIN SEVERITY IN PAST 24 HOURS: 8/10
LOSS OF SENSATION IN FEET: 0
PAIN LOCATION - PAIN SEVERITY: 3/10

## 2025-06-07 ASSESSMENT — ACTIVITIES OF DAILY LIVING (ADL)
CURRENT_FUNCTION: STAND BY ASSIST
OASIS_M1830: 03
AMBULATION_DISTANCE/DURATION_TOLERATED: 50 FEET
AMBULATION ASSISTANCE: STAND BY ASSIST
ENTERING_EXITING_HOME: STAND BY ASSIST

## 2025-06-08 LAB
FUNGUS SPEC CULT: NORMAL
FUNGUS SPEC FUNGUS STN: NORMAL

## 2025-06-09 DIAGNOSIS — M62.838 MUSCLE SPASM: Primary | ICD-10-CM

## 2025-06-09 RX ORDER — CYCLOBENZAPRINE HCL 5 MG
5 TABLET ORAL 3 TIMES DAILY
Qty: 30 TABLET | Refills: 0 | Status: SHIPPED | OUTPATIENT
Start: 2025-06-09 | End: 2025-06-19

## 2025-06-10 ENCOUNTER — OFFICE VISIT (OUTPATIENT)
Dept: SURGICAL ONCOLOGY | Facility: CLINIC | Age: 55
End: 2025-06-10
Payer: COMMERCIAL

## 2025-06-10 ENCOUNTER — LAB (OUTPATIENT)
Dept: LAB | Facility: HOSPITAL | Age: 55
End: 2025-06-10
Payer: COMMERCIAL

## 2025-06-10 VITALS
DIASTOLIC BLOOD PRESSURE: 77 MMHG | SYSTOLIC BLOOD PRESSURE: 118 MMHG | WEIGHT: 173.2 LBS | TEMPERATURE: 97.5 F | OXYGEN SATURATION: 98 % | HEART RATE: 92 BPM | RESPIRATION RATE: 18 BRPM | BODY MASS INDEX: 29.73 KG/M2

## 2025-06-10 DIAGNOSIS — C49.9 MALIGNANT NEOPLASM OF CONNECTIVE AND SOFT TISSUE, UNSPECIFIED: Primary | ICD-10-CM

## 2025-06-10 DIAGNOSIS — C49.9 SARCOMA (MULTI): ICD-10-CM

## 2025-06-10 LAB
ALBUMIN SERPL BCP-MCNC: 4.4 G/DL (ref 3.4–5)
ANION GAP SERPL CALC-SCNC: 15 MMOL/L (ref 10–20)
BUN SERPL-MCNC: 13 MG/DL (ref 6–23)
CALCIUM SERPL-MCNC: 9.7 MG/DL (ref 8.6–10.6)
CHLORIDE SERPL-SCNC: 97 MMOL/L (ref 98–107)
CO2 SERPL-SCNC: 29 MMOL/L (ref 21–32)
CREAT SERPL-MCNC: 1.42 MG/DL (ref 0.5–1.05)
EGFRCR SERPLBLD CKD-EPI 2021: 43 ML/MIN/1.73M*2
ERYTHROCYTE [DISTWIDTH] IN BLOOD BY AUTOMATED COUNT: 14 % (ref 11.5–14.5)
GLUCOSE SERPL-MCNC: 93 MG/DL (ref 74–99)
HCT VFR BLD AUTO: 34.6 % (ref 36–46)
HGB BLD-MCNC: 10.3 G/DL (ref 12–16)
MCH RBC QN AUTO: 26.9 PG (ref 26–34)
MCHC RBC AUTO-ENTMCNC: 29.8 G/DL (ref 32–36)
MCV RBC AUTO: 90 FL (ref 80–100)
NRBC BLD-RTO: 0 /100 WBCS (ref 0–0)
PHOSPHATE SERPL-MCNC: 4.6 MG/DL (ref 2.5–4.9)
PLATELET # BLD AUTO: 585 X10*3/UL (ref 150–450)
POTASSIUM SERPL-SCNC: 4.3 MMOL/L (ref 3.5–5.3)
RBC # BLD AUTO: 3.83 X10*6/UL (ref 4–5.2)
SODIUM SERPL-SCNC: 137 MMOL/L (ref 136–145)
WBC # BLD AUTO: 14.8 X10*3/UL (ref 4.4–11.3)

## 2025-06-10 PROCEDURE — 1036F TOBACCO NON-USER: CPT | Performed by: SURGERY

## 2025-06-10 PROCEDURE — 80069 RENAL FUNCTION PANEL: CPT

## 2025-06-10 PROCEDURE — 3078F DIAST BP <80 MM HG: CPT | Performed by: SURGERY

## 2025-06-10 PROCEDURE — 85027 COMPLETE CBC AUTOMATED: CPT

## 2025-06-10 PROCEDURE — 99211 OFF/OP EST MAY X REQ PHY/QHP: CPT | Performed by: SURGERY

## 2025-06-10 PROCEDURE — 3074F SYST BP LT 130 MM HG: CPT | Performed by: SURGERY

## 2025-06-10 PROCEDURE — 36415 COLL VENOUS BLD VENIPUNCTURE: CPT

## 2025-06-10 ASSESSMENT — ENCOUNTER SYMPTOMS
OCCASIONAL FEELINGS OF UNSTEADINESS: 0
DEPRESSION: 0
LOSS OF SENSATION IN FEET: 0

## 2025-06-10 ASSESSMENT — PAIN SCALES - GENERAL: PAINLEVEL_OUTOF10: 0-NO PAIN

## 2025-06-10 NOTE — PROGRESS NOTES
Postoperative Visit    Referring Provider:  Leyda Null DO    Chief Complaint:  Chief Complaint   Patient presents with    Follow-up   Right Retroperitoneal Sarcoma    History of Present Illness:  This is a 54 y.o. female who presents with a right retroperitoneal liposarcoma.  She presented for medical attention due to sciatic pain and right back pain and was found to have hydronephrosis and a urinary tract infection.  The kidney was decompressed with a right ureteral stent and treated with antibiotics.  She continues to have some flank tenderness but no longer has positive urine culture and states that most of her symptoms have improved.  Imaging did identify changes in the retroperitoneal fat and a nodule within this.  This was biopsied with needle biopsy and confirmed to be well-differentiated liposarcoma.  The patient had a CT scan of the abdomen pelvis that showed 1 subcentimeter liver lesion and some pleural-based lung lesions of uncertain significance.  She presents today for consideration of management of this tumor.    Surgery 5/28/2025 - Radical resection right retroperitoneal liposarcoma with en bloc resection of the right kidney and ureter  Pathology-pending    6/10/2025-postop visit.  The patient was discharged on home oxygen and will be seeing pulmonology tomorrow for evaluation.  She states that she continues to use the oxygen as she notices drops into the high 80s on her oxygen saturation with changes in position.  She occasionally gets lightheaded with standing and requires a little bit of time to settle out.  She has been drinking about 50 to 100 ounces of liquids a day.  Her urine output has remained steady.  There has been no change in color.  She has been eating although has managed to get about 2 meals per day due to a lack of appetite.  She denies any fevers or chills.  She has been ambulating well.  She denies constipation and is having bowel movements  "regularly.      Review of Systems:  A complete 12 point review of systems was performed and is negative except as noted in the history of present illness.    Vital Signs:  Vitals:    06/10/25 0944   BP: 118/77   Pulse: 92   Resp: 18   Temp: 36.4 °C (97.5 °F)   SpO2: 98%      Physical Exam:  GEN: No acute distress, home oxygen.  HEENT: Moist mucus membranes, normocephalic  CARDS: RRR  PULM: No respiratory distress, wearing oxygen 2 L saturating 98%  GI: Soft, non-distended.  Nontympanic.  No tenderness to palpation.  Incision without underlying fluid collections.  Staples removed in clinic today.    SKIN: No rashes or lesions  NEURO: No gross sensorimotor deficits  EXT: No arm or leg swelling    Laboratory Values:  Lab Results   Component Value Date    WBC 11.9 (H) 06/04/2025    HGB 9.7 (L) 06/04/2025    HCT 29.7 (L) 06/04/2025    MCV 84 06/04/2025     06/04/2025        Chemistry    Lab Results   Component Value Date/Time     06/04/2025 0518     02/05/2025 0812    K 3.6 06/04/2025 0518    K 3.7 02/05/2025 0812    CL 99 06/04/2025 0518     02/05/2025 0812    CO2 31 06/04/2025 0518    CO2 32 02/05/2025 0812    BUN 5 (L) 06/04/2025 0518    BUN 16 02/05/2025 0812    CREATININE 1.32 (H) 06/04/2025 0518    CREATININE 0.82 02/05/2025 0812    Lab Results   Component Value Date/Time    CALCIUM 8.2 (L) 06/04/2025 0518    CALCIUM 9.6 02/05/2025 0812    ALKPHOS 165 (H) 06/03/2025 0516    ALKPHOS 53 02/05/2025 0812    AST 27 06/03/2025 0516    AST 18 02/05/2025 0812    ALT 22 06/03/2025 0516    ALT 17 02/05/2025 0812    BILITOT 1.1 06/03/2025 0516    BILITOT 1.2 02/05/2025 0812           No results found for: \"PR1\"          Assessment:  This is a 54 y.o. female who presents with a right retroperitoneal liposarcoma in the setting of ureteral obstruction and inflammatory changes as seen on the CT scan.  The kidney is decompressed at this time with a right ureteral stent.  The patient is feeling better " although not fully resolved of her CVA tenderness.  I reviewed the scan with her and her  discussing the necessity for right nephrectomy at the time of retroperitoneal liposarcoma resection due to the extent of tumor surrounding the kidney and ureter.  Since the patient continues to have tenderness, I do recommend for repeat imaging as well as a CT scan of the chest prior to surgery.    Radical resection right retroperitoneal liposarcoma with en bloc nephrectomy.  5/28/2025    Currently recovering.  Ongoing issues include home oxygen requirement, and low nutritional intake/decreased appetite      Plan:  -- Labs in clinic today.  -- Follow-up pulmonology visit tomorrow.  -- I will see the patient back in 2 weeks for reevaluation.  She will check her weight daily and will pay attention to her oral intake.  If both of these things drop we will talk before this next appointment  -- The patient asked very appropriate questions that were answered to the best of my ability with the current information at hand. She knows to call with any questions or concerns that arise        Enzo Villa MD, MPH

## 2025-06-11 ENCOUNTER — OFFICE VISIT (OUTPATIENT)
Dept: PULMONOLOGY | Facility: CLINIC | Age: 55
End: 2025-06-11
Payer: COMMERCIAL

## 2025-06-11 VITALS
BODY MASS INDEX: 29.02 KG/M2 | WEIGHT: 170 LBS | SYSTOLIC BLOOD PRESSURE: 108 MMHG | DIASTOLIC BLOOD PRESSURE: 68 MMHG | OXYGEN SATURATION: 96 % | HEART RATE: 85 BPM | HEIGHT: 64 IN

## 2025-06-11 DIAGNOSIS — D72.829 LEUKOCYTOSIS, UNSPECIFIED TYPE: ICD-10-CM

## 2025-06-11 DIAGNOSIS — K57.92 DIVERTICULITIS: ICD-10-CM

## 2025-06-11 DIAGNOSIS — R06.89 ACUTE RESPIRATORY INSUFFICIENCY: ICD-10-CM

## 2025-06-11 DIAGNOSIS — R91.1 LUNG NODULE: ICD-10-CM

## 2025-06-11 DIAGNOSIS — J45.40 MODERATE PERSISTENT ASTHMA WITHOUT COMPLICATION (HHS-HCC): Primary | ICD-10-CM

## 2025-06-11 LAB
ACID FAST STN SPEC: NORMAL
LAB AP BLOCK FOR ADDITIONAL STUDIES: NORMAL
LABORATORY COMMENT REPORT: NORMAL
MYCOBACTERIUM SPEC CULT: NORMAL
PATH REPORT.FINAL DX SPEC: NORMAL
PATH REPORT.GROSS SPEC: NORMAL
PATH REPORT.RELEVANT HX SPEC: NORMAL
PATH REPORT.TOTAL CANCER: NORMAL
PATHOLOGY SYNOPTIC REPORT: NORMAL

## 2025-06-11 PROCEDURE — 3074F SYST BP LT 130 MM HG: CPT | Performed by: NURSE PRACTITIONER

## 2025-06-11 PROCEDURE — 99204 OFFICE O/P NEW MOD 45 MIN: CPT | Performed by: NURSE PRACTITIONER

## 2025-06-11 PROCEDURE — 3008F BODY MASS INDEX DOCD: CPT | Performed by: NURSE PRACTITIONER

## 2025-06-11 PROCEDURE — 99202 OFFICE O/P NEW SF 15 MIN: CPT | Performed by: NURSE PRACTITIONER

## 2025-06-11 PROCEDURE — 3078F DIAST BP <80 MM HG: CPT | Performed by: NURSE PRACTITIONER

## 2025-06-11 PROCEDURE — 1036F TOBACCO NON-USER: CPT | Performed by: NURSE PRACTITIONER

## 2025-06-11 RX ORDER — FLUTICASONE FUROATE AND VILANTEROL TRIFENATATE 200; 25 UG/1; UG/1
1 POWDER RESPIRATORY (INHALATION) DAILY
Qty: 60 EACH | Refills: 5 | Status: SHIPPED | OUTPATIENT
Start: 2025-06-11

## 2025-06-11 RX ORDER — ALBUTEROL SULFATE AND BUDESONIDE 90; 80 UG/1; UG/1
2 AEROSOL, METERED RESPIRATORY (INHALATION) AS NEEDED
Qty: 10.7 G | Refills: 5 | Status: SHIPPED | OUTPATIENT
Start: 2025-06-11

## 2025-06-11 ASSESSMENT — ENCOUNTER SYMPTOMS
ROS GI COMMENTS: +ACID REFLUX
WEAKNESS: 0
VOMITING: 0
APPETITE CHANGE: 1
EYE REDNESS: 0
PALPITATIONS: 0
HEADACHES: 0
BRUISES/BLEEDS EASILY: 0
WHEEZING: 0
MYALGIAS: 1
TREMORS: 0
ARTHRALGIAS: 1
JOINT SWELLING: 0
DIARRHEA: 0
STRIDOR: 0
SINUS PRESSURE: 0
FATIGUE: 1
TROUBLE SWALLOWING: 0
COUGH: 0
DIZZINESS: 1
RHINORRHEA: 0
NAUSEA: 0
SINUS PAIN: 0
CHEST TIGHTNESS: 1
UNEXPECTED WEIGHT CHANGE: 0
SHORTNESS OF BREATH: 1
SORE THROAT: 0
NERVOUS/ANXIOUS: 1
ABDOMINAL PAIN: 1
AGITATION: 0
FEVER: 0
BACK PAIN: 1
SLEEP DISTURBANCE: 1
ACTIVITY CHANGE: 0
CHILLS: 0
EYE ITCHING: 0
VOICE CHANGE: 0

## 2025-06-11 ASSESSMENT — PAIN SCALES - GENERAL: PAINLEVEL_OUTOF10: 4

## 2025-06-11 ASSESSMENT — LIFESTYLE VARIABLES
HOW MANY STANDARD DRINKS CONTAINING ALCOHOL DO YOU HAVE ON A TYPICAL DAY: 1 OR 2
AUDIT-C TOTAL SCORE: 1
SKIP TO QUESTIONS 9-10: 1
HOW OFTEN DO YOU HAVE SIX OR MORE DRINKS ON ONE OCCASION: NEVER
HOW OFTEN DO YOU HAVE A DRINK CONTAINING ALCOHOL: MONTHLY OR LESS

## 2025-06-11 ASSESSMENT — PATIENT HEALTH QUESTIONNAIRE - PHQ9
1. LITTLE INTEREST OR PLEASURE IN DOING THINGS: SEVERAL DAYS
SUM OF ALL RESPONSES TO PHQ9 QUESTIONS 1 & 2: 2
2. FEELING DOWN, DEPRESSED OR HOPELESS: SEVERAL DAYS

## 2025-06-11 NOTE — PROGRESS NOTES
Patient: Winter Feliz    MRN: [id number]  : 1970 -- AGE: 54 y.o.    Provider: MINA Garcia     Location [facility]   Service Date: 2025       Department of Medicine  Division of Pulmonary, Critical Care, and Sleep Medicine       [facility]  New Visit Note    HISTORY OF PRESENT ILLNESS     The patient's referring provider is: Pita Villegas AP*    PCP: Dr. Star Null   Surgical Oncology: Dr. Enzo Villa   Sleep: Dr. Birmingham  ENT: Dr. Desai    HISTORY OF PRESENT ILLNESS   Winter Feliz is a 54 y.o. female who presents to [facility] for an evaluation with concerns of Consult, Hypoxia (Started when she had abdominal surgery for cancer 2 weeks ago), and Asthma.   I have independently interviewed and examined the patient in the office and reviewed available records.    Current History  Patient presents to pulmonary clinic today for new patient establishment; concern of respiratory failure.  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  During her postop course she became hypoxic and was placed on oxygen and discharged home with.  Presents to pulmonary clinic to address this today.  Patient also has a history of asthma; current regimen is Breo 200 and Airsupra as needed.  Most recent chest CT on 5/15/2025 shows a 6 mm lung nodule at the right lower lobe; unchanged from prior exam on 3/20/2025.  Some of the other previously seen pleural-based bilateral lung base nodules on prior scan are no longer seen.    On today's visit, the patient states she was diagnosed with asthma around age 17. Asthma has been stable ever since. Has been wearing 2L of oxygen while she sleeps and on exertion; has a POC. Will monitor her levels at home. At rest; will be 90-93% on RA. Has not needed to use her AirSupra  much at all lately. Usually well controlled on Breo.     Currently, patient reports symptoms of:   []None  []SOB at Rest               [x]WINTERS              []Cough:            []Wheezing               [x]Chest Tightness; also has been having abdominal spasms from her recent big incision from surgery  []Orthopnea   [x]Lower Leg Edema; mild towards end of the day   []Chest Pain  []Palpitations   [x]GERD; controlled on pantoprazole   []Rhinitis  []Throat Clearing  []Voice Hoarseness    Prior Pulmonary History:   []None  []Born Premature  [x]Pulmonary Issues in Childhood; diagnosed with asthma age [age]  []Pulmonary Focused Hospitalizations  [x]Hx of Home Oxygen Use; just recently    Prior/Current Inhaler History:   []None                         []ICS:        []YESSY:    [x]ICS/LABA: Breo Ellipta      []YESSY/LUIS ALBERTO:   []ICS/LABA/LAMA:   []LABA:    [x]Other: AirSupra PRN  []LAMA:   []LABA/LAMA:     Sleep History:  []None     []Witnessed Apneic Events/Abnormal Breathing Patterns []Dozing Off Easily  [x]Completed a Sleep Study in the Past []Waking Up Choking/Gasping    []Daytime Napping  [x]Has Been Diagnosed with IGGY  []Morning Headaches     []Wears CPAP/BiPAP  [x]Snoring     []Excessive Daytime Fatigue     []Wears Nocturnal Oxygen  Has Inspire implant (2021)    CAT Today: 22  ACT Today: 23  ESS Today: 14    Prior Notes & History   Admitted 5/28/25 - 6/4/25 for 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  Transferred to [facility] post-op.  Post-op course significant for urinary retention requiring smith replacement (passed 2nd TOV); slow return of GI function; acute respiratory insufficiency requiring O2.  Diet advanced as tolerated with return of bowel function.  IV medication transitioned to oral.  DC home POD 7.     REVIEW OF SYSTEMS      REVIEW OF SYSTEMS  Review of Systems   Constitutional:  Positive for appetite change and fatigue. Negative for activity change, chills, fever and unexpected weight change.   HENT:  Negative for congestion, postnasal drip, rhinorrhea, sinus pressure, sinus pain, sneezing, sore throat, trouble swallowing and voice change.         Denies throat clearing   Eyes:  Negative for redness and itching.   Respiratory:  Positive for chest tightness and shortness of breath. Negative for cough, wheezing and stridor.    Cardiovascular:  Negative for chest pain, palpitations and leg swelling.        Denies orthopnea   Gastrointestinal:  Positive for abdominal pain. Negative for diarrhea, nausea and vomiting.        +acid reflux   Musculoskeletal:  Positive for arthralgias, back pain and myalgias. Negative for joint swelling.   Skin:  Negative for rash.   Allergic/Immunologic: Negative for immunocompromised state.   Neurological:  Positive for dizziness. Negative for tremors, weakness and headaches.   Hematological:  Does not bruise/bleed easily.   Psychiatric/Behavioral:  Positive for sleep disturbance. Negative for agitation. The patient is nervous/anxious.         +depression   All other systems reviewed and are negative.      ALLERGIES & MEDICATIONS     ALLERGIES  Allergies[1]    MEDICATIONS  Current Medications[2]    PAST HISTORY     PAST MEDICAL HISTORY  She  has a past medical history of Anxiety, Asthma, Body mass index (BMI) 33.0-33.9, adult (05/01/2022), Cervical disc disease, Cyst of right kidney, Depression, Disease of thyroid gland (2007), Dyslipidemia, Gallstones, GERD (gastroesophageal reflux disease), History of dysphagia, History of recent steroid use, Hypertension, Hypothyroidism (2007), Irritable bowel syndrome, Liver lesion, IGGY (obstructive sleep apnea), PONV (postoperative nausea and vomiting) (07/2007), Psoriasis, Psoriatic arthritis (Multi), Retroperitoneal sarcoma (Multi), Snoring, Spondyloarthritis, and  Thyroid cancer (Multi).    PAST SURGICAL HISTORY  Surgical History[3]    IMMUNIZATION HISTORY  Immunization History   Administered Date(s) Administered    Flu vaccine (IIV4), preservative free *Check age/dose* 09/25/2016, 09/14/2020, 10/12/2021, 10/09/2022, 10/20/2023    Flu vaccine, trivalent, preservative free, age 6 months and greater (Fluarix/Fluzone/Flulaval) 10/11/2015    Hepatitis B vaccine, adult *Check Product/Dose* 08/10/2011    Influenza Whole 09/13/2013    Influenza, Unspecified 09/22/2011    Influenza, injectable, MDCK, quadrivalent 09/25/2018    Influenza, injectable, quadrivalent 09/11/2017, 11/12/2019, 11/09/2020    Influenza, seasonal, injectable 10/11/2015, 10/12/2016    Novel influenza-H1N1-09, preservative-free 12/30/2009    Pfizer COVID-19 vaccine, 12 years and older, (30mcg/0.3mL) (Comirnaty) 10/20/2023    Pfizer COVID-19 vaccine, bivalent, age 12 years and older (30 mcg/0.3 mL) 10/09/2022    Pfizer Purple Cap SARS-CoV-2 03/22/2021, 04/12/2021, 04/22/2021, 12/10/2021    Pneumococcal conjugate vaccine, 13-valent (PREVNAR 13) 12/12/2022    Pneumococcal polysaccharide vaccine, 23-valent, age 2 years and older (PNEUMOVAX 23) 09/11/2017    Td vaccine, age 7 years and older (TDVAX) 08/12/2009    Tdap vaccine, age 7 year and older (BOOSTRIX, ADACEL) 01/06/2024    Zoster vaccine, recombinant, adult (SHINGRIX) 10/20/2023, 01/06/2024       SOCIAL HISTORY  She  reports that she has never smoked. She has been exposed to tobacco smoke. She has never used smokeless tobacco. She reports that she does not currently use alcohol. She reports that she does not use drugs.     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   Constitutional:       General: She is not in acute distress.     Appearance: Normal appearance. She is not ill-appearing or toxic-appearing.   HENT:      Head: Normocephalic.      Nose: No rhinorrhea.   Cardiovascular:      Rate and Rhythm: Normal rate and regular rhythm.      Heart sounds: Normal heart sounds.   Pulmonary:      Effort: Pulmonary effort is normal. No respiratory distress.      Breath sounds: Normal breath sounds. No stridor. No wheezing, rhonchi or rales.   Abdominal:      General: Abdomen is flat.   Musculoskeletal:         General: Normal range of motion.      Right lower leg: No edema.      Left lower leg: No edema.   Skin:     General: Skin is warm and dry.      Nails: There is no clubbing.   Neurological:      General: No focal deficit present.      Mental Status: She is alert and oriented to person, place, and time.   Psychiatric:         Mood and Affect: Mood normal.         Behavior: Behavior normal.         Judgment: Judgment normal.         RESULTS/DATA     Pulmonary Function Test Results     No PFT on record    Chest Radiograph   CXR   6/4/25: IMPRESSION: 1. Low lung volumes with bronchovascular crowding. 2. Similar bibasilar opacities, likely representing atelectasis/edema. Superimposed infection is not excluded. 3. Suggestion of trace left pleural effusion. 4. Medical devices as detailed above.   5/31/25: IMPRESSION: 1. Bilateral low lung volumes with bronchovascular crowding. 2. Bibasilar opacities likely representing atelectasis. 3. Medical devices as detailed above.     Chest CT Scan   CT Chest/A/P   5/15/25: IMPRESSION: 1. Retroperitoneal mass in keeping with sarcoma per history with lesion intervally increased in size " "measuring 2.4 x 1.7 cm and on the prior examination measured 2.0 x 1.4 cm. 2. Moderate generalized stranding of the right perinephric fat as seen on the prior examination. Interval placement of right double-J ureteral stent in place. Resultant mild uroepithelial enhancement with postinflammatory change suggested 3. There is a 6 mm pulmonary nodule at the right lower lobe laterally as seen on the prior exam. Of note, subtle pleural-based pulmonary nodules at bilateral lung bases posteriorly are no longer seen.   3/20/25: Several pulmonary nodules and subpleural nodular opacities in the imaged lower lungs with largest subpleural nodular opacity measuring 9 mm. Outpatient CT chest recommended for further evaluation and to assess for additional pulmonary nodules.     Echocardiogram & Cardiac Studies     No results found for this or any previous visit from the past 365 days.       Labwork & Pathology   Complete Blood Count  Lab Results   Component Value Date    WBC 14.8 (H) 06/10/2025    HGB 10.3 (L) 06/10/2025    HCT 34.6 (L) 06/10/2025    MCV 90 06/10/2025     (H) 06/10/2025     Peripheral Eosinophil Count:   Eosinophils Absolute   Date Value   03/20/2025 0.16 x10*3/uL   03/10/2025 0.16 x10*3/uL   04/25/2024 0.22 x10*3/uL   11/16/2023 0.24 x10*3/uL   05/30/2023 0.61 K/UL (H)   11/30/2022 0.21 x10E9/L   07/18/2022 0.17 K/UL   04/04/2022 0.21 x10E9/L   01/25/2022 0.14 K/UL     ABSOLUTE EOSINOPHILS (cells/uL)   Date Value   02/05/2025 117     Immunocap IgE  No results found for: \"ICIGE\"    Bronchoscopy & Pathology/Cultures   Pathology   4/10/25: Soft Tissue, Right Retroperitoneum, Biopsy:     Low grade sarcoma with MDM2 amplification, compatible with liposarcoma     ASSESSMENT/PLAN     Ms. Feliz is a 54 y.o. female; was referred to the [facility] for evaluation of Consult, Hypoxia (Started when she had abdominal surgery for cancer 2 weeks ago), and Asthma    Problem List and " Orders  Diagnoses and all orders for this visit:  Moderate persistent asthma without complication ([facility])  -     Breo Ellipta 200-25 mcg/dose inhaler; Inhale 1 puff once daily.  -     albuterol-budesonide (Airsupra) 90-80 mcg/actuation inhaler; Inhale 2 puffs if needed (shortness of breath/wheezing). Please rinse mouth after use to avoid thrush. No more than 6 doses (12 inhalations) in 24 hours.  Acute respiratory insufficiency  -     Referral to Pulmonology  -     Oximetry Desat/O2 Titration (Exercise Desat); Future  Lung nodule  -     CT chest wo IV contrast; Future      Assessment and Plan / Recommendations:  Asthma: diagnosed age 18-19. Has been well maintained on Breo 200 and AirSupra PRN  -Continue Breo 200 daily  -Continue AirSupra PRN  -Well controlled    2. 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  During her postop course she became hypoxic and was placed on oxygen and discharged home with it. Wears 2L at night and on exertion.  -Ordering O2 desat  -Ordering nocturnal oximetry study on RA (will have Inspire implant turned on; confirmed w/ Dr. Birmingham)  -Advised to monitor SpO2 levels closely at home with goal to be >88%    3. Lung Nodule: Most recent chest CT on 5/15/2025 shows a 6 mm lung nodule at the right lower lobe; unchanged from prior exam on 3/20/2025.  Some of the other previously seen pleural-based bilateral lung base nodules on prior scan are no longer seen.  -Ordering repeat chest CT as 6 month follow up; due 11/15/25  -Never smoker  -Recently diagnosed with right retroperitoneal sarcoma and following with oncology    4. IGGY: has inspire implant from 2021  -Follows w/ Dr. Birmingham and Dr. Desai    RTC 6 months    David Gorman, CNP  Associate Pulmonary Nurse Practitioner    *This note was dictated using DRAGON speech recognition software and was corrected for  spelling or grammatical errors, but despite proofreading several typographical errors might be present that might affect the meaning of the content.*          [1]   Allergies  Allergen Reactions    Lipitor [Atorvastatin] Other     Muscle aches    Methotrexate GI Upset    Iodine Other     vomiting   [2]   Current Outpatient Medications   Medication Sig Dispense Refill    albuterol-budesonide (Airsupra) 90-80 mcg/actuation inhaler Inhale 2 puffs 4 times a day as needed.      amLODIPine (Norvasc) 5 mg tablet Take 1 tablet (5 mg) by mouth once daily.      Breo Ellipta 200-25 mcg/dose inhaler INHALE 1 PUFF ONCE A DAY 90      buPROPion XL (Wellbutrin XL) 150 mg 24 hr tablet Take 1 tablet (150 mg) by mouth once daily.      chlorthalidone (Hygroton) 25 mg tablet TAKE 1 TAB BY MOUTH EVERY MORNING WITH FOOD 30 tablet 11    citalopram (CeleXA) 40 mg tablet Take 1 tablet (40 mg) by mouth once daily.      cyclobenzaprine (Flexeril) 5 mg tablet Take 1 tablet (5 mg) by mouth 3 times a day for 10 days. 30 tablet 0    enoxaparin (Lovenox) 40 mg/0.4 mL syringe Inject 0.4 mL (40 mg) under the skin once daily. 8.4 mL 0    ergocalciferol (Vitamin D-2) 1.25 MG (76821 UT) capsule TAKE 1 CAPSULE (91627 UNITS) BY MOUTH ONE TIME PER WEEK 12 capsule 1    ezetimibe (Zetia) 10 mg tablet TAKE 1 TABLET BY MOUTH EVERY DAY 90 tablet 1    folic acid (Folvite) 1 mg tablet TAKE 1 TABLET ORALLY ONCE A DAY EXCEPT THE DAY OF METHOTREXATE 90 DAYS 90 tablet 3    oxygen (O2) gas therapy Inhale 2 L/min at 120,000 mL/hr continuously. DME RYAN      pantoprazole (ProtoNix) 40 mg EC tablet Take 1 tablet (40 mg) by mouth 2 times a day.      potassium chloride CR 10 mEq ER tablet Take 1 tablet (10 mEq) by mouth once daily.      rosuvastatin (Crestor) 40 mg tablet TAKE 1 TABLET BY MOUTH EVERY DAY 90 tablet 0    Synthroid 100 mcg tablet Take 1 tablet (100 mcg) by mouth once daily in the morning. Take before meals.      traMADol (Ultram) 50 mg tablet Take 1 tablet  (50 mg) by mouth every 6 hours if needed for severe pain (7 - 10) for up to 7 days. 28 tablet 0     No current facility-administered medications for this visit.   [3]   Past Surgical History:  Procedure Laterality Date     SECTION, CLASSIC  2016     Section     SECTION, LOW TRANSVERSE  , , ,     ENDOMETRIAL ABLATION  2007    FOOT SURGERY      cyst removed from L  foot between 1st and 2nd MTP    HAND SURGERY Right     R  cyst removal    OTHER SURGICAL HISTORY      hypoglossal nerve stimulator- INSPIRE    OTHER SURGICAL HISTORY      kidney removal, stranding , liposarcoma    TOTAL THYROIDECTOMY  2007    first partial, then total   [4]   Family History  Problem Relation Name Age of Onset    Breast cancer Mother Stacey 59        metastatic    Cancer Mother Stacey     Alcohol abuse Father Sadiq Hillmanbrian     No Known Problems Daughter      No Known Problems Son      Hypertension Maternal Grandmother Grandma     Glaucoma Maternal Grandmother Grandma     Heart disease Maternal Grandmother Grandma     Stroke Maternal Grandmother Grandma     No Known Problems Maternal Grandfather      No Known Problems Paternal Grandmother      No Known Problems Paternal Grandfather      No Known Problems Mother's Sister

## 2025-06-11 NOTE — PATIENT INSTRUCTIONS
Today we discussed your pulmonary history, recent surgery, oxygen and plan of care moving forward.    -Ordering repeat chest CT as a 6-month follow-up; due mid November 2025.  -Ordering oxygen study to assess your current oxygen needs when ambulating/exerting yourself  -Continue to monitor your oxygen levels at home with the goal to remain above 88%  -Continue Breo 200 (fluticasone + vilanterol); 1 inhalation once a day. Rinse mouth after use to avoid thrush. This is your long acting daily maintenance inhaler. Please contact my nurse if you have any difficulty getting this prescription.  -Continue AirSupra (albuterol + budesonide); 2 puffs as needed for shortness of breath/wheezing. Rinse mouth after use to avoid thrush. No more than 6 doses (12 inhalations) in 24 hours. Please contact the pulmonary RN if you have trouble getting this prescription.    Thank you for visiting the pulmonary clinic today! It was a pleasure to participate in your care.  Please return to clinic 6 months or sooner if needed.    David Gorman, CNP  My Office Number: [phone]   CT Scheduling: [phone]  PFT (Pulmonary Function Test) Scheduling: [phone]  Follow Up Visit Scheduling: [phone]  My Nurse: Alexus Romero RN & Elsy Julien, RN    To reach the nurse, Alexus Romero RN, please call ([phone]). If unable to reach Alexus, please contact Elsy Julien, RN at ([phone]). Both nurses have secure voicemail's if needing to leave a message.    **For urgent needs such as medication issues/refills, active sick symptoms or medical concerns please call the office directly and speak to the pulmonary nurse. For nonurgent messages please use [employer]. Thank you.**

## 2025-06-12 ENCOUNTER — LAB (OUTPATIENT)
Dept: LAB | Facility: HOSPITAL | Age: 55
End: 2025-06-12
Payer: COMMERCIAL

## 2025-06-12 ENCOUNTER — HOME CARE VISIT (OUTPATIENT)
Dept: HOME HEALTH SERVICES | Facility: HOME HEALTH | Age: 55
End: 2025-06-12
Payer: COMMERCIAL

## 2025-06-12 ENCOUNTER — HOSPITAL ENCOUNTER (OUTPATIENT)
Dept: RADIOLOGY | Facility: CLINIC | Age: 55
Discharge: HOME | End: 2025-06-12
Payer: COMMERCIAL

## 2025-06-12 DIAGNOSIS — D72.829 LEUKOCYTOSIS, UNSPECIFIED TYPE: ICD-10-CM

## 2025-06-12 LAB
APPEARANCE UR: CLEAR
BACTERIA #/AREA URNS AUTO: ABNORMAL /HPF
BILIRUB UR STRIP.AUTO-MCNC: NEGATIVE MG/DL
COLOR UR: ABNORMAL
GLUCOSE UR STRIP.AUTO-MCNC: NORMAL MG/DL
HOLD SPECIMEN: NORMAL
KETONES UR STRIP.AUTO-MCNC: NEGATIVE MG/DL
LEUKOCYTE ESTERASE UR QL STRIP.AUTO: ABNORMAL
MUCOUS THREADS #/AREA URNS AUTO: ABNORMAL /LPF
NITRITE UR QL STRIP.AUTO: NEGATIVE
PH UR STRIP.AUTO: 6 [PH]
PROT UR STRIP.AUTO-MCNC: NEGATIVE MG/DL
RBC # UR STRIP.AUTO: NEGATIVE MG/DL
RBC #/AREA URNS AUTO: ABNORMAL /HPF
SP GR UR STRIP.AUTO: 1.01
SQUAMOUS #/AREA URNS AUTO: ABNORMAL /HPF
UROBILINOGEN UR STRIP.AUTO-MCNC: NORMAL MG/DL
WBC #/AREA URNS AUTO: ABNORMAL /HPF

## 2025-06-12 PROCEDURE — 71046 X-RAY EXAM CHEST 2 VIEWS: CPT | Performed by: RADIOLOGY

## 2025-06-12 PROCEDURE — G0157 HHC PT ASSISTANT EA 15: HCPCS | Mod: CQ

## 2025-06-12 PROCEDURE — 87086 URINE CULTURE/COLONY COUNT: CPT

## 2025-06-12 PROCEDURE — 71046 X-RAY EXAM CHEST 2 VIEWS: CPT

## 2025-06-12 PROCEDURE — 81001 URINALYSIS AUTO W/SCOPE: CPT

## 2025-06-13 ENCOUNTER — HOME CARE VISIT (OUTPATIENT)
Dept: HOME HEALTH SERVICES | Facility: HOME HEALTH | Age: 55
End: 2025-06-13
Payer: COMMERCIAL

## 2025-06-13 DIAGNOSIS — N30.00 ACUTE CYSTITIS WITHOUT HEMATURIA: Primary | ICD-10-CM

## 2025-06-13 LAB — BACTERIA UR CULT: NORMAL

## 2025-06-13 PROCEDURE — G0157 HHC PT ASSISTANT EA 15: HCPCS | Mod: CQ

## 2025-06-13 RX ORDER — SULFAMETHOXAZOLE AND TRIMETHOPRIM 800; 160 MG/1; MG/1
1 TABLET ORAL 2 TIMES DAILY
Qty: 14 TABLET | Refills: 0 | Status: SHIPPED | OUTPATIENT
Start: 2025-06-13 | End: 2025-06-20

## 2025-06-16 ENCOUNTER — HOME CARE VISIT (OUTPATIENT)
Dept: HOME HEALTH SERVICES | Facility: HOME HEALTH | Age: 55
End: 2025-06-16
Payer: COMMERCIAL

## 2025-06-16 ENCOUNTER — HOSPITAL ENCOUNTER (OUTPATIENT)
Dept: RESPIRATORY THERAPY | Facility: HOSPITAL | Age: 55
Setting detail: THERAPIES SERIES
Discharge: HOME | End: 2025-06-16
Payer: COMMERCIAL

## 2025-06-16 VITALS
OXYGEN SATURATION: 97 % | SYSTOLIC BLOOD PRESSURE: 110 MMHG | HEART RATE: 82 BPM | DIASTOLIC BLOOD PRESSURE: 70 MMHG | RESPIRATION RATE: 18 BRPM | TEMPERATURE: 97.8 F

## 2025-06-16 DIAGNOSIS — R06.89 ACUTE RESPIRATORY INSUFFICIENCY: ICD-10-CM

## 2025-06-16 DIAGNOSIS — J18.9 PNEUMONIA OF LEFT LOWER LOBE DUE TO INFECTIOUS ORGANISM: Primary | ICD-10-CM

## 2025-06-16 LAB
FUNGUS SPEC CULT: NORMAL
FUNGUS SPEC FUNGUS STN: NORMAL

## 2025-06-16 PROCEDURE — 94618 PULMONARY STRESS TESTING: CPT

## 2025-06-16 PROCEDURE — G0157 HHC PT ASSISTANT EA 15: HCPCS | Mod: CQ

## 2025-06-16 RX ORDER — DOXYCYCLINE 100 MG/1
100 CAPSULE ORAL 2 TIMES DAILY
Qty: 10 CAPSULE | Refills: 0 | Status: SHIPPED | OUTPATIENT
Start: 2025-06-16 | End: 2025-06-21

## 2025-06-16 RX ORDER — AMOXICILLIN AND CLAVULANATE POTASSIUM 875; 125 MG/1; MG/1
1 TABLET, FILM COATED ORAL 2 TIMES DAILY
Qty: 10 TABLET | Refills: 0 | Status: SHIPPED | OUTPATIENT
Start: 2025-06-16 | End: 2025-06-21

## 2025-06-16 ASSESSMENT — PAIN DESCRIPTION - PAIN TYPE: TYPE: SURGICAL PAIN

## 2025-06-18 ENCOUNTER — HOME CARE VISIT (OUTPATIENT)
Dept: HOME HEALTH SERVICES | Facility: HOME HEALTH | Age: 55
End: 2025-06-18
Payer: COMMERCIAL

## 2025-06-18 VITALS
SYSTOLIC BLOOD PRESSURE: 120 MMHG | HEART RATE: 68 BPM | DIASTOLIC BLOOD PRESSURE: 76 MMHG | TEMPERATURE: 98.9 F | OXYGEN SATURATION: 99 % | RESPIRATION RATE: 18 BRPM

## 2025-06-18 LAB
ACID FAST STN SPEC: NORMAL
MYCOBACTERIUM SPEC CULT: NORMAL

## 2025-06-18 PROCEDURE — G0157 HHC PT ASSISTANT EA 15: HCPCS | Mod: CQ

## 2025-06-18 ASSESSMENT — PAIN DESCRIPTION - PAIN TYPE: TYPE: SURGICAL PAIN

## 2025-06-24 ENCOUNTER — APPOINTMENT (OUTPATIENT)
Dept: SURGICAL ONCOLOGY | Facility: CLINIC | Age: 55
End: 2025-06-24
Payer: COMMERCIAL

## 2025-06-24 DIAGNOSIS — C48.0: Primary | ICD-10-CM

## 2025-06-25 ENCOUNTER — HOME CARE VISIT (OUTPATIENT)
Dept: HOME HEALTH SERVICES | Facility: HOME HEALTH | Age: 55
End: 2025-06-25
Payer: COMMERCIAL

## 2025-06-25 VITALS — TEMPERATURE: 98.7 F | SYSTOLIC BLOOD PRESSURE: 132 MMHG | OXYGEN SATURATION: 97 % | DIASTOLIC BLOOD PRESSURE: 80 MMHG

## 2025-06-25 LAB
ACID FAST STN SPEC: NORMAL
MYCOBACTERIUM SPEC CULT: NORMAL

## 2025-06-25 PROCEDURE — G0151 HHCP-SERV OF PT,EA 15 MIN: HCPCS

## 2025-06-25 ASSESSMENT — ENCOUNTER SYMPTOMS
DENIES PAIN: 1
MUSCLE WEAKNESS: 1

## 2025-06-25 ASSESSMENT — ACTIVITIES OF DAILY LIVING (ADL)
HOME_HEALTH_OASIS: 00
OASIS_M1830: 01

## 2025-06-27 ENCOUNTER — LAB (OUTPATIENT)
Dept: LAB | Facility: HOSPITAL | Age: 55
End: 2025-06-27
Payer: COMMERCIAL

## 2025-06-27 ENCOUNTER — TUMOR BOARD CONFERENCE (OUTPATIENT)
Dept: HEMATOLOGY/ONCOLOGY | Facility: HOSPITAL | Age: 55
End: 2025-06-27
Payer: COMMERCIAL

## 2025-06-27 ENCOUNTER — OFFICE VISIT (OUTPATIENT)
Dept: SURGICAL ONCOLOGY | Facility: HOSPITAL | Age: 55
End: 2025-06-27
Payer: COMMERCIAL

## 2025-06-27 VITALS
SYSTOLIC BLOOD PRESSURE: 136 MMHG | HEART RATE: 84 BPM | DIASTOLIC BLOOD PRESSURE: 64 MMHG | OXYGEN SATURATION: 100 % | BODY MASS INDEX: 29.33 KG/M2 | RESPIRATION RATE: 16 BRPM | TEMPERATURE: 97.2 F | WEIGHT: 170.86 LBS

## 2025-06-27 DIAGNOSIS — R10.11 RIGHT UPPER QUADRANT ABDOMINAL PAIN: ICD-10-CM

## 2025-06-27 DIAGNOSIS — C49.9 SARCOMA (MULTI): Primary | ICD-10-CM

## 2025-06-27 DIAGNOSIS — C49.9 SARCOMA (MULTI): ICD-10-CM

## 2025-06-27 LAB
ALBUMIN SERPL BCP-MCNC: 4.6 G/DL (ref 3.4–5)
ALP SERPL-CCNC: 63 U/L (ref 33–110)
ALT SERPL W P-5'-P-CCNC: 12 U/L (ref 7–45)
ANION GAP SERPL CALC-SCNC: 15 MMOL/L (ref 10–20)
AST SERPL W P-5'-P-CCNC: 12 U/L (ref 9–39)
BILIRUB SERPL-MCNC: 1 MG/DL (ref 0–1.2)
BUN SERPL-MCNC: 20 MG/DL (ref 6–23)
CALCIUM SERPL-MCNC: 9.8 MG/DL (ref 8.6–10.3)
CHLORIDE SERPL-SCNC: 104 MMOL/L (ref 98–107)
CO2 SERPL-SCNC: 26 MMOL/L (ref 21–32)
CREAT SERPL-MCNC: 1.29 MG/DL (ref 0.5–1.05)
EGFRCR SERPLBLD CKD-EPI 2021: 49 ML/MIN/1.73M*2
ERYTHROCYTE [DISTWIDTH] IN BLOOD BY AUTOMATED COUNT: 14.3 % (ref 11.5–14.5)
GLUCOSE SERPL-MCNC: 89 MG/DL (ref 74–99)
HCT VFR BLD AUTO: 33.1 % (ref 36–46)
HGB BLD-MCNC: 10.5 G/DL (ref 12–16)
MCH RBC QN AUTO: 26.8 PG (ref 26–34)
MCHC RBC AUTO-ENTMCNC: 31.7 G/DL (ref 32–36)
MCV RBC AUTO: 84 FL (ref 80–100)
NRBC BLD-RTO: 0 /100 WBCS (ref 0–0)
PLATELET # BLD AUTO: 270 X10*3/UL (ref 150–450)
POTASSIUM SERPL-SCNC: 3.6 MMOL/L (ref 3.5–5.3)
PROT SERPL-MCNC: 7.3 G/DL (ref 6.4–8.2)
RBC # BLD AUTO: 3.92 X10*6/UL (ref 4–5.2)
SODIUM SERPL-SCNC: 141 MMOL/L (ref 136–145)
WBC # BLD AUTO: 6.4 X10*3/UL (ref 4.4–11.3)

## 2025-06-27 PROCEDURE — 3075F SYST BP GE 130 - 139MM HG: CPT | Performed by: SURGERY

## 2025-06-27 PROCEDURE — 1036F TOBACCO NON-USER: CPT | Performed by: SURGERY

## 2025-06-27 PROCEDURE — 36415 COLL VENOUS BLD VENIPUNCTURE: CPT

## 2025-06-27 PROCEDURE — 85027 COMPLETE CBC AUTOMATED: CPT

## 2025-06-27 PROCEDURE — 3078F DIAST BP <80 MM HG: CPT | Performed by: SURGERY

## 2025-06-27 PROCEDURE — 99211 OFF/OP EST MAY X REQ PHY/QHP: CPT | Performed by: SURGERY

## 2025-06-27 PROCEDURE — 84075 ASSAY ALKALINE PHOSPHATASE: CPT

## 2025-06-27 ASSESSMENT — PAIN SCALES - GENERAL: PAINLEVEL_OUTOF10: 0-NO PAIN

## 2025-06-27 NOTE — TUMOR BOARD NOTE
Tumor Board Documentation    Mrs Feliz was presented by Dr. Enzo Villa at our Sarcoma Tumor Board on 6/27/2025, which included representatives from  Surgical Oncology, Ortho Oncology, Medical Oncology, Radiation Oncology, Radiology ([facility]), and Sarcoma Pathology.    Mrs Feliz currently presents with a right retroperitoneal lipomatous mass causing ureteral obstruction. The patient had a right ureteral stent placed. IR biopsy of a nodular portion of the retroperitoneal mass was consistent with a well differentiated liposarcoma with sclerosis. Chest CT identified a 6mm right lung lesion that is stable on imaging back to 2011.    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 Lipoma-like and inflammatory variants with osseous metaplasia    Additionally, we reviewed previous medical and familial history, history of present illness, and recent lab results along with all available histopathologic and imaging studies. The tumor board considered available treatment options and made the following recommendations:     [ ] Surveillance imaging      Clinical Trial Status:   N/A    National site-specific guidelines were discussed with respect to the case.

## 2025-06-27 NOTE — PROGRESS NOTES
Postoperative Visit    Referring Provider:  Leyda Null DO    Chief Complaint:  No chief complaint on file.  Right Retroperitoneal Sarcoma    History of Present Illness:  This is a 54 y.o. female who presents with a right retroperitoneal liposarcoma.  She presented for medical attention due to sciatic pain and right back pain and was found to have hydronephrosis and a urinary tract infection.  The kidney was decompressed with a right ureteral stent and treated with antibiotics.  She continues to have some flank tenderness but no longer has positive urine culture and states that most of her symptoms have improved.  Imaging did identify changes in the retroperitoneal fat and a nodule within this.  This was biopsied with needle biopsy and confirmed to be well-differentiated liposarcoma.  The patient had a CT scan of the abdomen pelvis that showed 1 subcentimeter liver lesion and some pleural-based lung lesions of uncertain significance.  She presents today for consideration of management of this tumor.    Surgery 5/28/2025 - Radical resection right retroperitoneal liposarcoma with en bloc resection of the right kidney and ureter  Pathology-well-differentiated liposarcoma with inflammatory variants and osseous metaplasia    6/27/2025 -the patient was treated for a pneumonia and is now off of oxygen.  She states that she is recovering slowly.  She does report that she has right-sided abdominal pain and epigastric pain.  She is only able to eat about 1 meal per day because of discomfort and occasional nausea.  She eats very slowly and reports that food does make her feel uncomfortable within about an hour. No fevers or chills. No dysuria.      Review of Systems:  A complete 12 point review of systems was performed and is negative except as noted in the history of present illness.    Vital Signs:  Vitals:    06/27/25 1324   BP: 136/64   Pulse: 84   Resp: 16   Temp: 36.2 °C (97.2 °F)   SpO2: 100%       "Physical Exam:  GEN: No acute distress  HEENT: Moist mucus membranes, normocephalic  CARDS: RRR  PULM: No respiratory distress  GI: Soft, non-distended.  Nontympanic.  Tenderness to palpation in the epigastric incision as well as the right abdomen.  No rebound tenderness.    SKIN: Midline incision is well-approximated without evidence of underlying fluid collections.  2 additional staples were removed.  NEURO: No gross sensorimotor deficits  EXT: Very mild bilateral lower extremity swelling    Laboratory Values:  Lab Results   Component Value Date    WBC 14.8 (H) 06/10/2025    HGB 10.3 (L) 06/10/2025    HCT 34.6 (L) 06/10/2025    MCV 90 06/10/2025     (H) 06/10/2025        Chemistry    Lab Results   Component Value Date/Time     06/10/2025 1051     02/05/2025 0812    K 4.3 06/10/2025 1051    K 3.7 02/05/2025 0812    CL 97 (L) 06/10/2025 1051     02/05/2025 0812    CO2 29 06/10/2025 1051    CO2 32 02/05/2025 0812    BUN 13 06/10/2025 1051    BUN 16 02/05/2025 0812    CREATININE 1.42 (H) 06/10/2025 1051    CREATININE 0.82 02/05/2025 0812    Lab Results   Component Value Date/Time    CALCIUM 9.7 06/10/2025 1051    CALCIUM 9.6 02/05/2025 0812    ALKPHOS 165 (H) 06/03/2025 0516    ALKPHOS 53 02/05/2025 0812    AST 27 06/03/2025 0516    AST 18 02/05/2025 0812    ALT 22 06/03/2025 0516    ALT 17 02/05/2025 0812    BILITOT 1.1 06/03/2025 0516    BILITOT 1.2 02/05/2025 0812           No results found for: \"PR1\"    PATHOLOGY  FINAL DIAGNOSIS      Right retroperitoneal mass, Radical resection with right nephrectomy:  -- Well differentiated liposarcoma (22.3 cm), lipoma-like and inflammatory variants, with osseous metaplasia; see synoptic report.       Assessment:  This is a 54 y.o. female who presents with a right retroperitoneal liposarcoma in the setting of ureteral obstruction and inflammatory changes as seen on the CT scan.  The kidney is decompressed at this time with a right ureteral stent.  The " patient is feeling better although not fully resolved of her CVA tenderness.  I reviewed the scan with her and her  discussing the necessity for right nephrectomy at the time of retroperitoneal liposarcoma resection due to the extent of tumor surrounding the kidney and ureter.  Since the patient continues to have tenderness, I do recommend for repeat imaging as well as a CT scan of the chest prior to surgery.    Radical resection right retroperitoneal liposarcoma with en bloc nephrectomy.  5/28/2025    Abdominal tenderness and discomfort while eating.  Limiting p.o. intake.  Despite the patient suggesting that she is slowly getting better, I am concerned for an intra-abdominal process.  Will obtain labs and then decide on the appropriate modality for imaging      Plan:  -- Labs in clinic today.  -- Plan for cross-sectional imaging after the labs are reviewed.  Follow-up will be determined by that imaging study  -- The patient asked very appropriate questions that were answered to the best of my ability with the current information at hand. She knows to call with any questions or concerns that arise        Enzo Villa MD, MPH

## 2025-07-01 ENCOUNTER — HOSPITAL ENCOUNTER (OUTPATIENT)
Dept: RADIOLOGY | Facility: CLINIC | Age: 55
Discharge: HOME | End: 2025-07-01
Payer: COMMERCIAL

## 2025-07-01 DIAGNOSIS — C49.9 SARCOMA (MULTI): ICD-10-CM

## 2025-07-01 DIAGNOSIS — R10.11 RIGHT UPPER QUADRANT ABDOMINAL PAIN: ICD-10-CM

## 2025-07-01 PROCEDURE — A9698 NON-RAD CONTRAST MATERIALNOC: HCPCS | Performed by: SURGERY

## 2025-07-01 PROCEDURE — 2550000001 HC RX 255 CONTRASTS: Performed by: SURGERY

## 2025-07-01 PROCEDURE — 74176 CT ABD & PELVIS W/O CONTRAST: CPT

## 2025-07-01 RX ADMIN — IOHEXOL 500 ML: 12 SOLUTION ORAL at 14:49

## 2025-07-02 LAB
ACID FAST STN SPEC: NORMAL
MYCOBACTERIUM SPEC CULT: NORMAL

## 2025-07-03 ENCOUNTER — NUTRITION (OUTPATIENT)
Dept: HEMATOLOGY/ONCOLOGY | Facility: HOSPITAL | Age: 55
End: 2025-07-03
Payer: COMMERCIAL
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
## 2025-07-04 DIAGNOSIS — E55.9 VITAMIN D DEFICIENCY: ICD-10-CM

## 2025-07-09 LAB
ACID FAST STN SPEC: NORMAL
MYCOBACTERIUM SPEC CULT: NORMAL

## 2025-07-09 RX ORDER — ERGOCALCIFEROL 1.25 MG/1
50000 CAPSULE ORAL
Qty: 12 CAPSULE | Refills: 1 | Status: SHIPPED | OUTPATIENT
Start: 2025-07-13

## 2025-07-10 ENCOUNTER — TELEPHONE (OUTPATIENT)
Facility: CLINIC | Age: 55
End: 2025-07-10
Payer: COMMERCIAL

## 2025-07-10 NOTE — TELEPHONE ENCOUNTER
"PT LEFT VM STATING SHE HAD LABS DONE,BUT RESULTS WILL BE \"OUT OF WHACK\". DX'D WITH CANCER \"IN ABD\" & HAD IT REMOVED 6 WEEKS AGO. NOW ONLY HAS 1 KIDNEY  "

## 2025-07-11 LAB
25(OH)D3+25(OH)D2 SERPL-MCNC: 69 NG/ML (ref 30–100)
ALBUMIN SERPL-MCNC: 4.8 G/DL (ref 3.6–5.1)
ALP SERPL-CCNC: 63 U/L (ref 37–153)
ALT SERPL-CCNC: 13 U/L (ref 6–29)
ANION GAP SERPL CALCULATED.4IONS-SCNC: 10 MMOL/L (CALC) (ref 7–17)
AST SERPL-CCNC: 16 U/L (ref 10–35)
BASOPHILS # BLD AUTO: 67 CELLS/UL (ref 0–200)
BASOPHILS NFR BLD AUTO: 0.9 %
BILIRUB SERPL-MCNC: 1.3 MG/DL (ref 0.2–1.2)
BUN SERPL-MCNC: 17 MG/DL (ref 7–25)
CALCIUM SERPL-MCNC: 9.7 MG/DL (ref 8.6–10.4)
CHLORIDE SERPL-SCNC: 98 MMOL/L (ref 98–110)
CK SERPL-CCNC: 66 U/L (ref 21–240)
CO2 SERPL-SCNC: 29 MMOL/L (ref 20–32)
CREAT SERPL-MCNC: 1.4 MG/DL (ref 0.5–1.03)
CRP SERPL-MCNC: <3 MG/L
EGFRCR SERPLBLD CKD-EPI 2021: 44 ML/MIN/1.73M2
EOSINOPHIL # BLD AUTO: 178 CELLS/UL (ref 15–500)
EOSINOPHIL NFR BLD AUTO: 2.4 %
ERYTHROCYTE [DISTWIDTH] IN BLOOD BY AUTOMATED COUNT: 14 % (ref 11–15)
ERYTHROCYTE [SEDIMENTATION RATE] IN BLOOD BY WESTERGREN METHOD: 29 MM/H
GLUCOSE SERPL-MCNC: 92 MG/DL (ref 65–139)
HCT VFR BLD AUTO: 34.2 % (ref 35–45)
HGB BLD-MCNC: 10.8 G/DL (ref 11.7–15.5)
LYMPHOCYTES # BLD AUTO: 2250 CELLS/UL (ref 850–3900)
LYMPHOCYTES NFR BLD AUTO: 30.4 %
MCH RBC QN AUTO: 26.3 PG (ref 27–33)
MCHC RBC AUTO-ENTMCNC: 31.6 G/DL (ref 32–36)
MCV RBC AUTO: 83.4 FL (ref 80–100)
MONOCYTES # BLD AUTO: 481 CELLS/UL (ref 200–950)
MONOCYTES NFR BLD AUTO: 6.5 %
NEUTROPHILS # BLD AUTO: 4425 CELLS/UL (ref 1500–7800)
NEUTROPHILS NFR BLD AUTO: 59.8 %
PLATELET # BLD AUTO: 465 THOUSAND/UL (ref 140–400)
PMV BLD REES-ECKER: 11.1 FL (ref 7.5–12.5)
POTASSIUM SERPL-SCNC: 3.8 MMOL/L (ref 3.5–5.3)
PROT SERPL-MCNC: 7.1 G/DL (ref 6.1–8.1)
RBC # BLD AUTO: 4.1 MILLION/UL (ref 3.8–5.1)
SODIUM SERPL-SCNC: 137 MMOL/L (ref 135–146)
WBC # BLD AUTO: 7.4 THOUSAND/UL (ref 3.8–10.8)

## 2025-07-13 LAB
APPEARANCE UR: CLEAR
BACTERIA #/AREA URNS HPF: ABNORMAL /HPF
BACTERIA UR CULT: ABNORMAL
BACTERIA UR CULT: ABNORMAL
BILIRUB UR QL STRIP: NEGATIVE
CAOX CRY #/AREA URNS HPF: ABNORMAL /HPF
COLOR UR: YELLOW
GLUCOSE UR QL STRIP: NEGATIVE
HGB UR QL STRIP: NEGATIVE
HYALINE CASTS #/AREA URNS LPF: ABNORMAL /LPF
KETONES UR QL STRIP: NEGATIVE
LEUKOCYTE ESTERASE UR QL STRIP: ABNORMAL
NITRITE UR QL STRIP: NEGATIVE
PH UR STRIP: 5.5 [PH] (ref 5–8)
PROT UR QL STRIP: NEGATIVE
RBC #/AREA URNS HPF: ABNORMAL /HPF
SERVICE CMNT-IMP: ABNORMAL
SP GR UR STRIP: 1.02 (ref 1–1.03)
SQUAMOUS #/AREA URNS HPF: ABNORMAL /HPF
WBC #/AREA URNS HPF: ABNORMAL /HPF

## 2025-07-15 LAB
ACID FAST STN SPEC: NORMAL
MYCOBACTERIUM SPEC CULT: NORMAL

## 2025-07-17 ENCOUNTER — APPOINTMENT (OUTPATIENT)
Dept: RHEUMATOLOGY | Facility: CLINIC | Age: 55
End: 2025-07-17
Payer: COMMERCIAL

## 2025-07-17 VITALS
SYSTOLIC BLOOD PRESSURE: 123 MMHG | BODY MASS INDEX: 28.67 KG/M2 | DIASTOLIC BLOOD PRESSURE: 77 MMHG | OXYGEN SATURATION: 100 % | WEIGHT: 167 LBS | HEART RATE: 83 BPM

## 2025-07-17 DIAGNOSIS — L40.50 PSORIATIC ARTHROPATHY (MULTI): Primary | ICD-10-CM

## 2025-07-17 PROCEDURE — 99214 OFFICE O/P EST MOD 30 MIN: CPT | Performed by: INTERNAL MEDICINE

## 2025-07-17 PROCEDURE — 3074F SYST BP LT 130 MM HG: CPT | Performed by: INTERNAL MEDICINE

## 2025-07-17 PROCEDURE — 3078F DIAST BP <80 MM HG: CPT | Performed by: INTERNAL MEDICINE

## 2025-07-17 ASSESSMENT — ENCOUNTER SYMPTOMS
LOSS OF SENSATION IN FEET: 0
OCCASIONAL FEELINGS OF UNSTEADINESS: 1
DEPRESSION: 0

## 2025-07-17 ASSESSMENT — PAIN SCALES - GENERAL: PAINLEVEL_OUTOF10: 4

## 2025-07-17 NOTE — PROGRESS NOTES
[facility]/  Rheumatology  [address], [address]  [place] [address]  Phone: [phone]  Fax: [phone]    Rheumatology Progress Note 07/17/2025      Winter Feliz is a 54 y.o. female here for   Chief Complaint   Patient presents with    Follow-up       Last Visit: 5/7/24    Rheum Hx    Features of SpA with elbow enthesitis/medial epicondylitis, Achillis  tendonitis, sacroiliac tenderness, possible psoriasis versus seborrheic  dermatitis, in addition to OA  Responds well to nabumetone  Noted labs before showed mild transaminitis deemed likely fatty liver  Referred by Dr Campbell for evaluate and treat- generalized OA.  Chief complaint: Arthritis-osteoarthritis  Since 6 months ago  Slow onset  Progressive remitting course  Precipitating factors: Walking, housework, caregiver job  Associated symptoms: Stiffness, swelling  Better: Sitting  Worse: Activity  Previous treatments:  Naproxen-somewhat helpful  Ibuprofen-somewhat helpful  Tylenol- no help  Nabumetone-very helpful  Occupation: Caregiver  Currently tenderness of bilateral shoulders, elbows, lower back,  bilateral hands, bilateral knees, bilateral ankles  Swelling of bilateral ankles  5-10 minutes morning stiffness  Review of systems positive for:  Fatigue  Scalp tenderness/dandruff  Dry eyes dry mouth- Restasis no help and burned  Swelling of the legs by the end of the day  History of asthma- well controlled  Gastroesophageal reflux disease- controlled w PPI/IBS  Thyroid cancer 2007 s/p resection and radiotherapy  Rashes- around face on forehead- itchy, scaly, red  Joint pain swelling stiffness  Back pain that wakes her up from sleep and improves upon  getting up- elvira SI area and in betwee shoulder blades; hx of sciatica;  no hx of trauma  Muscle aches  Weakness arms legs sometimes probably due to pain  Thyroid disease  Last menstrual period 2007- endometrial ablation hx; DXA yrs  ok  Allergies  Depression anxiety  Sleep  disturbance- has IGGY and just had Inspire implant- not  on yet.  Component      Latest Ref Rng 9/23/2021 5/30/2023   DEEPIKA Negative…     DEEPIKA Titer Negative…     DEEPIKA Pattern (Note)…     SSA ANTIBODIES <0.2…     SSB ANTIBODIES <0.2…     Angiotensin 1 Conv 27…     HLA-B27 Dna Result Negative…     Citrulline Antibody, IgG <15…     Rheumatoid Factor      0 - 20 IU/ML 10     RNP Antibody <0.2…     Thyroglobulin Abs  <0.9…          Previous Tx  Naproxen-somewhat helpful  Ibuprofen-somewhat helpful  Tylenol- no help  Nabumetone-very helpful  Methotrexate- discontinued due to GI upset  SSZ- discontinued due to lack of efficacy  Otezla- discontinued due to lack of efficacy    Health Maintenance  DXA-  T -0.7 (Hip; 11/2021)  Malignancy Hx- thyroid cancer, s/p removal  Component      Latest Ref Rng 9/23/2021   HIV 1/2 Antigen/Antibody Screen with Reflex to Confirmation Non Reactive…    HIV 1/2 Antibody Confirmation Test Not Indicated    Hiv Interpretation Negative…    Hepatitis B Surface AG      NEG  NEGATIVE    Hepatitis C AB Negative…      Component      Latest Ref Rng 10/5/2021   TB Result Negative…      Immunization History   Administered Date(s) Administered    Flu vaccine (IIV4), preservative free *Check age/dose* 09/25/2016, 09/14/2020, 10/12/2021, 10/09/2022, 10/20/2023    Flu vaccine, trivalent, preservative free, age 6 months and greater (Fluarix/Fluzone/Flulaval) 10/11/2015    Hepatitis B vaccine, adult *Check Product/Dose* 08/10/2011    Influenza Whole 09/13/2013    Influenza, Unspecified 09/22/2011    Influenza, injectable, MDCK, quadrivalent 09/25/2018    Influenza, injectable, quadrivalent 09/11/2017, 11/12/2019, 11/09/2020    Influenza, seasonal, injectable 10/11/2015, 10/12/2016    Novel influenza-H1N1-09, preservative-free 12/30/2009    Pfizer COVID-19 vaccine, 12 years and older, (30mcg/0.3mL) (Comirnaty) 10/20/2023    Pfizer COVID-19 vaccine, bivalent, age 12 years and older (30 mcg/0.3 mL) 10/09/2022     Pfizer Purple Cap SARS-CoV-2 2021, 2021, 2021, 12/10/2021    Pneumococcal conjugate vaccine, 13-valent (PREVNAR 13) 2022    Pneumococcal polysaccharide vaccine, 23-valent, age 2 years and older (PNEUMOVAX 23) 2017    Td vaccine, age 7 years and older (TDVAX) 2009    Tdap vaccine, age 7 year and older (BOOSTRIX, ADACEL) 2024    Zoster vaccine, recombinant, adult (SHINGRIX) 10/20/2023, 2024          Past Medical History:   Diagnosis Date    Anxiety     Asthma     Body mass index (BMI) 33.0-33.9, adult 2022    BMI 33.0-33.9,adult    Cervical disc disease     Colon polyp     Cyst of right kidney     resolved per last U/S    Depression     Disease of thyroid gland     Dyslipidemia     Gallstones     GERD (gastroesophageal reflux disease)     History of dysphagia     since thyroidectomy    History of recent steroid use     Hypertension     Hypothyroidism 2007    Irritable bowel syndrome     Liver lesion     IGGY (obstructive sleep apnea)     Pt has INSPIRE device    PONV (postoperative nausea and vomiting) 2007    Psoriasis     Psoriatic arthritis (Multi)     Retroperitoneal sarcoma (Multi)     Snoring     Spondyloarthritis     Thyroid cancer (Multi)       Past Surgical History:   Procedure Laterality Date     SECTION, CLASSIC  2016     Section     SECTION, LOW TRANSVERSE  , , ,     ENDOMETRIAL ABLATION      FOOT SURGERY      cyst removed from L  foot between 1st and 2nd MTP    HAND SURGERY Right     R  cyst removal    OTHER SURGICAL HISTORY      hypoglossal nerve stimulator- INSPIRE    OTHER SURGICAL HISTORY      kidney removal, stranding , liposarcoma    THYROID SURGERY  10/2007    TOTAL THYROIDECTOMY  2007    first partial, then total      Current Outpatient Medications   Medication Sig Dispense Refill    albuterol-budesonide (Airsupra) 90-80 mcg/actuation inhaler Inhale 2 puffs if needed (shortness of  breath/wheezing). Please rinse mouth after use to avoid thrush. No more than 6 doses (12 inhalations) in 24 hours. 10.7 g 5    Breo Ellipta 200-25 mcg/dose inhaler Inhale 1 puff once daily. 60 each 5    buPROPion XL (Wellbutrin XL) 150 mg 24 hr tablet Take 1 tablet (150 mg) by mouth once daily.      citalopram (CeleXA) 40 mg tablet Take 1 tablet (40 mg) by mouth once daily.      ergocalciferol (Vitamin D-2) 1250 mcg (50,000 units) capsule TAKE 1 CAPSULE (87647 UNITS) BY MOUTH ONE TIME PER WEEK 12 capsule 1    ezetimibe (Zetia) 10 mg tablet TAKE 1 TABLET BY MOUTH EVERY DAY 90 tablet 1    folic acid (Folvite) 1 mg tablet TAKE 1 TABLET ORALLY ONCE A DAY EXCEPT THE DAY OF METHOTREXATE 90 DAYS 90 tablet 3    pantoprazole (ProtoNix) 40 mg EC tablet Take 1 tablet (40 mg) by mouth 2 times a day.      potassium chloride CR 10 mEq ER tablet Take 1 tablet (10 mEq) by mouth once daily.      rosuvastatin (Crestor) 40 mg tablet TAKE 1 TABLET BY MOUTH EVERY DAY 90 tablet 0    Synthroid 100 mcg tablet Take 1 tablet (100 mcg) by mouth once daily in the morning. Take before meals.       No current facility-administered medications for this visit.      Allergies   Allergen Reactions    Lipitor [Atorvastatin] Other     Muscle aches    Methotrexate GI Upset    Iodine Other     vomiting        Visit Vitals  /77 (BP Location: Left arm, Patient Position: Sitting)   Pulse 83   Wt 75.8 kg (167 lb)   SpO2 100%   BMI 28.67 kg/m²   OB Status Postmenopausal   Smoking Status Never   BSA 1.85 m²            Rapid 3                      Workup       Component      Latest Ref Rng 7/10/2025 7/11/2025   WHITE BLOOD CELL COUNT      3.8 - 10.8 Thousand/uL 7.4     RED BLOOD CELL COUNT      3.80 - 5.10 Million/uL 4.10     HEMOGLOBIN      11.7 - 15.5 g/dL 10.8 (L)     HEMATOCRIT      35.0 - 45.0 % 34.2 (L)     MCV      80.0 - 100.0 fL 83.4     MCH      27.0 - 33.0 pg 26.3 (L)     MCHC      32.0 - 36.0 g/dL 31.6 (L)     RDW      11.0 - 15.0 % 14.0      PLATELET COUNT      140 - 400 Thousand/uL 465 (H)     MPV      7.5 - 12.5 fL 11.1     ABSOLUTE NEUTROPHILS      1,500 - 7,800 cells/uL 4,425     ABSOLUTE LYMPHOCYTES      850 - 3,900 cells/uL 2,250     ABSOLUTE MONOCYTES      200 - 950 cells/uL 481     ABSOLUTE EOSINOPHILS      15 - 500 cells/uL 178     ABSOLUTE BASOPHILS      0 - 200 cells/uL 67     NEUTROPHILS      % 59.8     LYMPHOCYTES      % 30.4     MONOCYTES      % 6.5     EOSINOPHILS      % 2.4     BASOPHILS      % 0.9     COLOR      YELLOW   YELLOW    APPEARANCE      CLEAR   CLEAR    SPECIFIC GRAVITY      1.001 - 1.035   1.017    PH      5.0 - 8.0   5.5    GLUCOSE      NEGATIVE   NEGATIVE    BILIRUBIN      NEGATIVE   NEGATIVE    KETONES      NEGATIVE   NEGATIVE    OCCULT BLOOD      NEGATIVE   NEGATIVE    PROTEIN      NEGATIVE   NEGATIVE    NITRITE      NEGATIVE   NEGATIVE    LEUKOCYTE ESTERASE      NEGATIVE   TRACE !    WBC      < OR = 5 /HPF  0-5    RBC      < OR = 2 /HPF  NONE SEEN    SQUAMOUS EPITHELIAL CELLS      < OR = 5 /HPF  0-5    BACTERIA      NONE SEEN /HPF  NONE SEEN    CALCIUM OXALATE CRYSTALS      NONE OR FEW /HPF  MANY !    HYALINE CAST      NONE SEEN /LPF  0-5 !    NOTE  -    CULTURE, URINE, ROUTINE  SEE NOTE    GLUCOSE      65 - 139 mg/dL 92     UREA NITROGEN (BUN)      7 - 25 mg/dL 17     CREATININE      0.50 - 1.03 mg/dL 1.40 (H)     EGFR      > OR = 60 mL/min/1.73m2 44 (L)     SODIUM      135 - 146 mmol/L 137     POTASSIUM      3.5 - 5.3 mmol/L 3.8     CHLORIDE      98 - 110 mmol/L 98     CARBON DIOXIDE      20 - 32 mmol/L 29     ELECTROLYTE BALANCE      7 - 17 mmol/L (calc) 10     CALCIUM      8.6 - 10.4 mg/dL 9.7     PROTEIN, TOTAL      6.1 - 8.1 g/dL 7.1     ALBUMIN      3.6 - 5.1 g/dL 4.8     BILIRUBIN, TOTAL      0.2 - 1.2 mg/dL 1.3 (H)     ALKALINE PHOSPHATASE      37 - 153 U/L 63     AST      10 - 35 U/L 16     ALT      6 - 29 U/L 13     C-REACTIVE PROTEIN      <8.0 mg/L <3.0     CREATINE KINASE, TOTAL      21 - 240 U/L 66      SED RATE BY MODIFIED WESTERGREN      < OR = 30 mm/h 29     VITAMIN D,25-OH,TOTAL,IA      30 - 100 ng/mL 69        Assessment/Plan  No diagnosis found.       No orders of the defined types were placed in this encounter.     Features of SpA/PsA with elbow enthesitis/med epicondylitis, Achilles tendonitis, SI tenderness, and psoraisis.  Responds well to nabumetone  Fatty liver with mild transaminitis- monitor  Mildly elevated Hg likely due to IGGY- has Inspire          Since last appt, adherent and tolerating  Am stiffness few minutes  Some swelling  Denies any recent or current infection.  Not on any NSAIDs or glucocorticoids.  ROS+ for   Rapid 3 consistent with   Labs reviewed  D/w pt tx options  Advised of possible side effects and importance of monitoring.   All questions answered.  Patient to follow up with primary care provider regarding all other medical issues not addressed today and for medical chart updating.    Patient Care Team:  Star Null DO as PCP - General (Family Medicine)  Camille Monson MD as Consulting Physician (Rheumatology)

## 2025-07-18 ENCOUNTER — LAB (OUTPATIENT)
Dept: LAB | Facility: HOSPITAL | Age: 55
End: 2025-07-18
Payer: COMMERCIAL

## 2025-07-18 ENCOUNTER — NUTRITION (OUTPATIENT)
Dept: HEMATOLOGY/ONCOLOGY | Facility: HOSPITAL | Age: 55
End: 2025-07-18

## 2025-07-18 ENCOUNTER — OFFICE VISIT (OUTPATIENT)
Dept: SURGICAL ONCOLOGY | Facility: HOSPITAL | Age: 55
End: 2025-07-18
Payer: COMMERCIAL

## 2025-07-18 VITALS
OXYGEN SATURATION: 100 % | SYSTOLIC BLOOD PRESSURE: 139 MMHG | RESPIRATION RATE: 16 BRPM | DIASTOLIC BLOOD PRESSURE: 63 MMHG | HEART RATE: 86 BPM | WEIGHT: 169.5 LBS | TEMPERATURE: 95.7 F | BODY MASS INDEX: 29.09 KG/M2

## 2025-07-18 DIAGNOSIS — R11.0 NAUSEA: Primary | ICD-10-CM

## 2025-07-18 DIAGNOSIS — R11.0 NAUSEA: ICD-10-CM

## 2025-07-18 DIAGNOSIS — C49.9 SARCOMA (MULTI): ICD-10-CM

## 2025-07-18 LAB
ALBUMIN SERPL BCP-MCNC: 4.7 G/DL (ref 3.4–5)
ALP SERPL-CCNC: 56 U/L (ref 33–110)
ALT SERPL W P-5'-P-CCNC: 10 U/L (ref 7–45)
ANION GAP SERPL CALC-SCNC: 13 MMOL/L (ref 10–20)
AST SERPL W P-5'-P-CCNC: 11 U/L (ref 9–39)
BILIRUB SERPL-MCNC: 1.4 MG/DL (ref 0–1.2)
BUN SERPL-MCNC: 16 MG/DL (ref 6–23)
CALCIUM SERPL-MCNC: 9.8 MG/DL (ref 8.6–10.3)
CHLORIDE SERPL-SCNC: 101 MMOL/L (ref 98–107)
CO2 SERPL-SCNC: 29 MMOL/L (ref 21–32)
CREAT SERPL-MCNC: 1.5 MG/DL (ref 0.5–1.05)
EGFRCR SERPLBLD CKD-EPI 2021: 41 ML/MIN/1.73M*2
GLUCOSE SERPL-MCNC: 92 MG/DL (ref 74–99)
POTASSIUM SERPL-SCNC: 3.8 MMOL/L (ref 3.5–5.3)
PROT SERPL-MCNC: 7.5 G/DL (ref 6.4–8.2)
SODIUM SERPL-SCNC: 139 MMOL/L (ref 136–145)

## 2025-07-18 PROCEDURE — 3078F DIAST BP <80 MM HG: CPT | Performed by: SURGERY

## 2025-07-18 PROCEDURE — 36415 COLL VENOUS BLD VENIPUNCTURE: CPT

## 2025-07-18 PROCEDURE — 3075F SYST BP GE 130 - 139MM HG: CPT | Performed by: SURGERY

## 2025-07-18 PROCEDURE — 99213 OFFICE O/P EST LOW 20 MIN: CPT | Performed by: SURGERY

## 2025-07-18 PROCEDURE — 80053 COMPREHEN METABOLIC PANEL: CPT

## 2025-07-18 RX ORDER — SCOPOLAMINE 1 MG/3D
1 PATCH, EXTENDED RELEASE TRANSDERMAL
Qty: 10 PATCH | Refills: 1 | Status: SHIPPED | OUTPATIENT
Start: 2025-07-18 | End: 2025-09-14

## 2025-07-18 ASSESSMENT — PAIN SCALES - GENERAL: PAINLEVEL_OUTOF10: 0-NO PAIN
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
## 2025-07-18 NOTE — PROGRESS NOTES
Follow up Visit    Referring Provider:  Leyda Null DO    Chief Complaint:  Right Retroperitoneal Sarcoma    History of Present Illness:  This is a 54 y.o. female who presents with a right retroperitoneal liposarcoma.  She presented for medical attention due to sciatic pain and right back pain and was found to have hydronephrosis and a urinary tract infection.  The kidney was decompressed with a right ureteral stent and treated with antibiotics.  She continues to have some flank tenderness but no longer has positive urine culture and states that most of her symptoms have improved.  Imaging did identify changes in the retroperitoneal fat and a nodule within this.  This was biopsied with needle biopsy and confirmed to be well-differentiated liposarcoma.  The patient had a CT scan of the abdomen pelvis that showed 1 subcentimeter liver lesion and some pleural-based lung lesions of uncertain significance.  She presents today for consideration of management of this tumor.    Surgery 5/28/2025 - Radical resection right retroperitoneal liposarcoma with en bloc resection of the right kidney and ureter  Pathology-well-differentiated liposarcoma with inflammatory variants and osseous metaplasia    7/18/2025 - Follow up. The patient reports continued difficulty eating stating that she has a taste for food, but when she eats she gets very nauseated. CT after the last visit did not show any concerns for edematous bowel. She continues to have bowel function. She has talked with nutrition and tried newer foods and supplements, but thenausea persists. She states that scopalamine patches have worked for her previous.       Review of Systems:  A complete 12 point review of systems was performed and is negative except as noted in the history of present illness.    Vital Signs:  Vitals:    07/18/25 1311   BP: 139/63   Pulse: 86   Resp: 16   Temp: 35.4 °C (95.7 °F)   SpO2: 100%      Physical Exam:  GEN: No acute  "distress  HEENT: Moist mucus membranes, normocephalic  CARDS: RRR  PULM: No respiratory distress  GI: Soft, non-distended.  Nontympanic.  Tenderness to palpation in the epigastric portion of the incision.  No rebound tenderness.    SKIN: Midline incision is well-approximated without evidence of underlying fluid collections.    EXT: Very mild bilateral lower extremity swelling    Laboratory Values:  Lab Results   Component Value Date    WBC 7.4 07/10/2025    HGB 10.8 (L) 07/10/2025    HCT 34.2 (L) 07/10/2025    MCV 83.4 07/10/2025     (H) 07/10/2025        Chemistry    Lab Results   Component Value Date/Time     07/10/2025 1529    K 3.8 07/10/2025 1529    CL 98 07/10/2025 1529    CO2 29 07/10/2025 1529    BUN 17 07/10/2025 1529    CREATININE 1.40 (H) 07/10/2025 1529    Lab Results   Component Value Date/Time    CALCIUM 9.7 07/10/2025 1529    ALKPHOS 63 07/10/2025 1529    AST 16 07/10/2025 1529    ALT 13 07/10/2025 1529    BILITOT 1.3 (H) 07/10/2025 1529           No results found for: \"PR1\"    PATHOLOGY  FINAL DIAGNOSIS      Right retroperitoneal mass, Radical resection with right nephrectomy:  -- Well differentiated liposarcoma (22.3 cm), lipoma-like and inflammatory variants, with osseous metaplasia; see synoptic report.       IMAGING  CT 7/1/2025  IMPRESSION:  Interval surgical changes right nephrectomy and resection of right  retroperitoneal liposarcoma without findings concerning for local  recurrence or metastatic disease within the abdomen or pelvis within  limitations of noncontrast technique.      Low-attenuation nodule along the course of the right common iliac  vessels reactive lymph node in the setting of recent surgery.  Continued attention on follow-up is recommended.      Additional chronic and/or ancillary findings detailed above.    Assessment:  This is a 54 y.o. female who presents with a right retroperitoneal liposarcoma in the setting of ureteral obstruction and inflammatory changes " as seen on the CT scan.  The kidney is decompressed at this time with a right ureteral stent.  The patient is feeling better although not fully resolved of her CVA tenderness.  I reviewed the scan with her and her  discussing the necessity for right nephrectomy at the time of retroperitoneal liposarcoma resection due to the extent of tumor surrounding the kidney and ureter.  Since the patient continues to have tenderness, I do recommend for repeat imaging as well as a CT scan of the chest prior to surgery.    Radical resection right retroperitoneal liposarcoma with en bloc nephrectomy.  5/28/2025    Nausea seems to be a significant factor limiting PO intake. Will trial Scopalamine patches. Additional supplements provided by Nutrition      Plan:  -- CMP in clinic today.  -- Scopolamine patches  -- Nutritional Supplements  -- The patient will communicate with me early next week to update on the impact of the scopolamine patch. If nutritional intake does not improve, we will need to consider enteral feeding. Virtual visit in 1 week  -- The patient asked very appropriate questions that were answered to the best of my ability with the current information at hand. She knows to call with any questions or concerns that arise        Enzo Villa MD, MPH

## 2025-07-20 LAB — HOLD SPECIMEN: NORMAL

## 2025-07-25 ENCOUNTER — TELEMEDICINE (OUTPATIENT)
Dept: SURGICAL ONCOLOGY | Facility: HOSPITAL | Age: 55
End: 2025-07-25
Payer: COMMERCIAL

## 2025-07-25 DIAGNOSIS — R11.0 NAUSEA: Primary | ICD-10-CM

## 2025-07-25 RX ORDER — ONDANSETRON 4 MG/1
8 TABLET, FILM COATED ORAL EVERY 8 HOURS PRN
Qty: 20 TABLET | Refills: 1 | Status: SHIPPED | OUTPATIENT
Start: 2025-07-25 | End: 2025-08-01

## 2025-07-25 NOTE — PROGRESS NOTES
VIRTUAL Follow up Visit    Referring Provider:  Leyda Null DO    Chief Complaint:  Right Retroperitoneal Sarcoma    History of Present Illness:  This is a 54 y.o. female who presents with a right retroperitoneal liposarcoma.  She presented for medical attention due to sciatic pain and right back pain and was found to have hydronephrosis and a urinary tract infection.  The kidney was decompressed with a right ureteral stent and treated with antibiotics.  She continues to have some flank tenderness but no longer has positive urine culture and states that most of her symptoms have improved.  Imaging did identify changes in the retroperitoneal fat and a nodule within this.  This was biopsied with needle biopsy and confirmed to be well-differentiated liposarcoma.  The patient had a CT scan of the abdomen pelvis that showed 1 subcentimeter liver lesion and some pleural-based lung lesions of uncertain significance.  She presents today for consideration of management of this tumor.    Surgery 5/28/2025 - Radical resection right retroperitoneal liposarcoma with en bloc resection of the right kidney and ureter  Pathology-well-differentiated liposarcoma with inflammatory variants and osseous metaplasia    7/18/2025 - The patient provided consent for the Virtual format of this visit. She reports having lost 2 more pounds. The scopolamine patch seems to be helping, but she continues to feel nausea with ~6 bits of food. She has been able to push the hydration without difficulty. She thinks more antinausea medication may help. She has started taking the [employer] supplements daily      Review of Systems:  A complete 12 point review of systems was performed and is negative except as noted in the history of present illness.     Physical Exam:  GEN: No acute distress, appears vigorous  The remainder of the physical exam was unable to be performed due to the virtual nature of the visit    Laboratory Values:  Lab  "Results   Component Value Date    WBC 7.4 07/10/2025    HGB 10.8 (L) 07/10/2025    HCT 34.2 (L) 07/10/2025    MCV 83.4 07/10/2025     (H) 07/10/2025        Chemistry    Lab Results   Component Value Date/Time     07/18/2025 1358     07/10/2025 1529    K 3.8 07/18/2025 1358    K 3.8 07/10/2025 1529     07/18/2025 1358    CL 98 07/10/2025 1529    CO2 29 07/18/2025 1358    CO2 29 07/10/2025 1529    BUN 16 07/18/2025 1358    BUN 17 07/10/2025 1529    CREATININE 1.50 (H) 07/18/2025 1358    CREATININE 1.40 (H) 07/10/2025 1529    Lab Results   Component Value Date/Time    CALCIUM 9.8 07/18/2025 1358    CALCIUM 9.7 07/10/2025 1529    ALKPHOS 56 07/18/2025 1358    ALKPHOS 63 07/10/2025 1529    AST 11 07/18/2025 1358    AST 16 07/10/2025 1529    ALT 10 07/18/2025 1358    ALT 13 07/10/2025 1529    BILITOT 1.4 (H) 07/18/2025 1358    BILITOT 1.3 (H) 07/10/2025 1529           No results found for: \"PR1\"      Assessment:  This is a 54 y.o. female who presents with a right retroperitoneal liposarcoma in the setting of ureteral obstruction and inflammatory changes as seen on the CT scan.  The kidney is decompressed at this time with a right ureteral stent.  The patient is feeling better although not fully resolved of her CVA tenderness.  I reviewed the scan with her and her  discussing the necessity for right nephrectomy at the time of retroperitoneal liposarcoma resection due to the extent of tumor surrounding the kidney and ureter.  Since the patient continues to have tenderness, I do recommend for repeat imaging as well as a CT scan of the chest prior to surgery.    Radical resection right retroperitoneal liposarcoma with en bloc nephrectomy.  5/28/2025    Nausea continues to limit PO intake. Will add Zofran as needed before meals.     Plan:  -- Adding Zofran prn  -- Continue with Nutritional Supplements  -- The patient will communicate with me early next week to update on the impact of the " scopolamine patch. If nutritional intake does not improve, we will need to consider enteral feeding. Phone call on 8/5/2025  -- The patient asked very appropriate questions that were answered to the best of my ability with the current information at hand. She knows to call with any questions or concerns that arise    15 minutes was used for this telephone visit    Enzo Villa MD, MPH

## 2025-08-06 ENCOUNTER — NUTRITION (OUTPATIENT)
Dept: HEMATOLOGY/ONCOLOGY | Facility: HOSPITAL | Age: 55
End: 2025-08-06
Payer: COMMERCIAL

## 2025-08-06 NOTE — PROGRESS NOTES
NUTRITION COMMUNICATION NOTE    Winter Feliz     REASON FOR COMMUNICATION:     Pt called to follow up on po intake and weight  She reports nausea with eating has improved with Zofran and scopolamine patch   She is now taking 5-6 bites every hour  She is eating a variety of foods- protein fruits, and vegetables  She reports her weight is stable  She is drinking [employer] 28 1.0- 1 carton per day to provide 325 calories and 16 g protein   She denies issues with vomiting and diarrhea  She is having constipation- going every 2 to 3 days    There was some discussion regarding feeding tube placement-  This does not appear needed at this time    Wt Readings from Last 10 Encounters:   07/18/25 76.9 kg (169 lb 8 oz)   07/17/25 75.8 kg (167 lb)   06/27/25 77.5 kg (170 lb 13.7 oz)   06/11/25 77.1 kg (170 lb)   06/10/25 78.6 kg (173 lb 3.2 oz)   06/04/25 76.3 kg (168 lb 3.2 oz)   05/20/25 80.7 kg (178 lb)   05/13/25 79.6 kg (175 lb 6.4 oz)   04/10/25 80.3 kg (177 lb)   04/03/25 80.7 kg (178 lb)     Lab Results   Component Value Date/Time    GLUCOSE 92 07/18/2025 1358    GLUCOSE 92 07/10/2025 1529     07/18/2025 1358     07/10/2025 1529    K 3.8 07/18/2025 1358    K 3.8 07/10/2025 1529     07/18/2025 1358    CL 98 07/10/2025 1529    CO2 29 07/18/2025 1358    CO2 29 07/10/2025 1529    ANIONGAP 13 07/18/2025 1358    ANIONGAP 10 07/10/2025 1529    BUN 16 07/18/2025 1358    BUN 17 07/10/2025 1529    CREATININE 1.50 (H) 07/18/2025 1358    CREATININE 1.40 (H) 07/10/2025 1529    EGFR 41 (L) 07/18/2025 1358    EGFR 44 (L) 07/10/2025 1529    CALCIUM 9.8 07/18/2025 1358    CALCIUM 9.7 07/10/2025 1529    ALBUMIN 4.7 07/18/2025 1358    ALBUMIN 4.8 07/10/2025 1529    ALKPHOS 56 07/18/2025 1358    ALKPHOS 63 07/10/2025 1529    PROT 7.5 07/18/2025 1358    PROT 7.1 07/10/2025 1529    AST 11 07/18/2025 1358    AST 16 07/10/2025 1529    BILITOT 1.4 (H) 07/18/2025 1358    BILITOT 1.3 (H) 07/10/2025 1529    ALT 10 07/18/2025 1358     ALT 13 07/10/2025 1529    MG 2.13 06/04/2025 0518    PHOS 4.6 06/10/2025 1051     Lab Results   Component Value Date/Time    VITD25 69 07/10/2025 1529           Above communicated to surgical oncology team via staff message

## 2025-08-10 DIAGNOSIS — J45.40 MODERATE PERSISTENT ASTHMA WITHOUT COMPLICATION (HHS-HCC): ICD-10-CM

## 2025-08-11 RX ORDER — FLUTICASONE FUROATE AND VILANTEROL TRIFENATATE 200; 25 UG/1; UG/1
1 POWDER RESPIRATORY (INHALATION) DAILY
Qty: 180 EACH | Refills: 5 | Status: SHIPPED | OUTPATIENT
Start: 2025-08-11

## 2025-08-22 ENCOUNTER — LAB (OUTPATIENT)
Dept: LAB | Facility: HOSPITAL | Age: 55
End: 2025-08-22
Payer: COMMERCIAL

## 2025-08-22 ENCOUNTER — OFFICE VISIT (OUTPATIENT)
Dept: SURGICAL ONCOLOGY | Facility: HOSPITAL | Age: 55
End: 2025-08-22
Payer: COMMERCIAL

## 2025-08-22 VITALS
DIASTOLIC BLOOD PRESSURE: 64 MMHG | TEMPERATURE: 98.1 F | SYSTOLIC BLOOD PRESSURE: 149 MMHG | OXYGEN SATURATION: 98 % | BODY MASS INDEX: 29.39 KG/M2 | WEIGHT: 171.2 LBS | HEART RATE: 79 BPM | RESPIRATION RATE: 16 BRPM

## 2025-08-22 DIAGNOSIS — C49.9 SARCOMA (MULTI): ICD-10-CM

## 2025-08-22 DIAGNOSIS — C49.9 SARCOMA (MULTI): Primary | ICD-10-CM

## 2025-08-22 DIAGNOSIS — R11.0 NAUSEA: ICD-10-CM

## 2025-08-22 LAB
ALBUMIN SERPL BCP-MCNC: 4.5 G/DL (ref 3.4–5)
ALP SERPL-CCNC: 60 U/L (ref 33–110)
ALT SERPL W P-5'-P-CCNC: 19 U/L (ref 7–45)
ANION GAP SERPL CALC-SCNC: 13 MMOL/L (ref 10–20)
AST SERPL W P-5'-P-CCNC: 19 U/L (ref 9–39)
BILIRUB SERPL-MCNC: 1.2 MG/DL (ref 0–1.2)
BUN SERPL-MCNC: 15 MG/DL (ref 6–23)
CALCIUM SERPL-MCNC: 9.2 MG/DL (ref 8.6–10.3)
CHLORIDE SERPL-SCNC: 101 MMOL/L (ref 98–107)
CO2 SERPL-SCNC: 29 MMOL/L (ref 21–32)
CREAT SERPL-MCNC: 1.28 MG/DL (ref 0.5–1.05)
EGFRCR SERPLBLD CKD-EPI 2021: 49 ML/MIN/1.73M*2
ERYTHROCYTE [DISTWIDTH] IN BLOOD BY AUTOMATED COUNT: 13.2 % (ref 11.5–14.5)
GLUCOSE SERPL-MCNC: 104 MG/DL (ref 74–99)
HCT VFR BLD AUTO: 34.1 % (ref 36–46)
HGB BLD-MCNC: 11.3 G/DL (ref 12–16)
IGG SERPL-MCNC: 710 MG/DL (ref 700–1600)
IGG1 SER-MCNC: 588 MG/DL (ref 490–1140)
IGG2 SER-MCNC: 144 MG/DL (ref 150–640)
IGG3 SER-MCNC: 17 MG/DL (ref 11–85)
IGG4 SER-MCNC: 21 MG/DL (ref 3–200)
LIPASE SERPL-CCNC: 18 U/L (ref 9–82)
MCH RBC QN AUTO: 26.2 PG (ref 26–34)
MCHC RBC AUTO-ENTMCNC: 33.1 G/DL (ref 32–36)
MCV RBC AUTO: 79 FL (ref 80–100)
NRBC BLD-RTO: 0 /100 WBCS (ref 0–0)
PLATELET # BLD AUTO: 312 X10*3/UL (ref 150–450)
POTASSIUM SERPL-SCNC: 3.9 MMOL/L (ref 3.5–5.3)
PROT SERPL-MCNC: 7.2 G/DL (ref 6.4–8.2)
RBC # BLD AUTO: 4.32 X10*6/UL (ref 4–5.2)
SODIUM SERPL-SCNC: 139 MMOL/L (ref 136–145)
WBC # BLD AUTO: 6.7 X10*3/UL (ref 4.4–11.3)

## 2025-08-22 PROCEDURE — 99213 OFFICE O/P EST LOW 20 MIN: CPT | Performed by: SURGERY

## 2025-08-22 PROCEDURE — 82784 ASSAY IGA/IGD/IGG/IGM EACH: CPT | Performed by: INTERNAL MEDICINE

## 2025-08-22 PROCEDURE — 80053 COMPREHEN METABOLIC PANEL: CPT

## 2025-08-22 PROCEDURE — 85027 COMPLETE CBC AUTOMATED: CPT

## 2025-08-22 PROCEDURE — 83690 ASSAY OF LIPASE: CPT | Performed by: INTERNAL MEDICINE

## 2025-08-22 RX ORDER — MIRTAZAPINE 15 MG/1
15 TABLET, FILM COATED ORAL NIGHTLY
Qty: 30 TABLET | Refills: 0 | Status: SHIPPED | OUTPATIENT
Start: 2025-08-22 | End: 2025-09-21

## 2025-08-22 ASSESSMENT — PAIN SCALES - GENERAL: PAINLEVEL_OUTOF10: 4

## 2025-09-05 ENCOUNTER — APPOINTMENT (OUTPATIENT)
Dept: SLEEP MEDICINE | Facility: CLINIC | Age: 55
End: 2025-09-05
Payer: COMMERCIAL

## 2025-09-05 ASSESSMENT — SLEEP AND FATIGUE QUESTIONNAIRES
HOW LIKELY ARE YOU TO NOD OFF OR FALL ASLEEP WHILE LYING DOWN TO REST IN THE AFTERNOON WHEN CIRCUMSTANCES PERMIT: HIGH CHANCE OF DOZING
HOW LIKELY ARE YOU TO NOD OFF OR FALL ASLEEP WHILE SITTING AND TALKING TO SOMEONE: WOULD NEVER DOZE
SLEEP_PROBLEM_INTERFERES_DAILY_ACTIVITIES: SOMEWHAT
DIFFICULTY_FALLING_ASLEEP: SEVERE
DIFFICULTY_STAYING_ASLEEP: MODERATE
HOW LIKELY ARE YOU TO NOD OFF OR FALL ASLEEP WHILE SITTING QUIETLY AFTER LUNCH WITHOUT ALCOHOL: MODERATE CHANCE OF DOZING
HOW LIKELY ARE YOU TO NOD OFF OR FALL ASLEEP WHILE WATCHING TV: HIGH CHANCE OF DOZING
HOW LIKELY ARE YOU TO NOD OFF OR FALL ASLEEP WHILE SITTING AND READING: HIGH CHANCE OF DOZING
ESS-CHAD TOTAL SCORE: 15
SLEEP_PROBLEM_NOTICEABLE_TO_OTHERS: SOMEWHAT
SATISFACTION_WITH_CURRENT_SLEEP_PATTERN: SATISFIED
HOW LIKELY ARE YOU TO NOD OFF OR FALL ASLEEP IN A CAR, WHILE STOPPED FOR A FEW MINUTES IN TRAFFIC: WOULD NEVER DOZE
WORRIED_DISTRESSED_DUE_TO_SLEEP: SOMEWHAT
SITING INACTIVE IN A PUBLIC PLACE LIKE A CLASS ROOM OR A MOVIE THEATER: SLIGHT CHANCE OF DOZING
HOW LIKELY ARE YOU TO NOD OFF OR FALL ASLEEP WHEN YOU ARE A PASSENGER IN A CAR FOR AN HOUR WITHOUT A BREAK: HIGH CHANCE OF DOZING

## 2026-01-16 ENCOUNTER — APPOINTMENT (OUTPATIENT)
Dept: SLEEP MEDICINE | Facility: CLINIC | Age: 56
End: 2026-01-16
Payer: COMMERCIAL

## (undated) DEVICE — Device

## (undated) DEVICE — PAD, GROUNDING, ELECTROSURGICAL, DUAL

## (undated) DEVICE — DRESSING, ADHESIVE, ISLAND, TELFA, 4 X 10 IN

## (undated) DEVICE — SPONGE, DISSECTOR, PEANUT, 3/8, STERILE 5 FOAM HOLDER"

## (undated) DEVICE — COVER, CART, 45 X 27 X 48 IN, CLEAR

## (undated) DEVICE — SUTURE, MONOCRYL, 4-0, 18 IN, PS2, UNDYED

## (undated) DEVICE — SPONGE, HEMOSTATIC, CELLULOSE, SURGICEL, NU-KNIT, 3 X 4 IN

## (undated) DEVICE — LIGASURE IMPACT, 18CM

## (undated) DEVICE — SUTURE, PDSII, 1, TP-1, VIL, MONO, 48LP

## (undated) DEVICE — SEALANT, HEMOSTATIC, FLOSEAL, 10 ML

## (undated) DEVICE — STAPLER, ECHELON 3000, 45MM STD

## (undated) DEVICE — CLIP, LIGATING, W/ADHESIVE PAD, MEDIUM, TITANIUM

## (undated) DEVICE — CLIP, LIGATING, HORIZON, LARGE, TITANIUM

## (undated) DEVICE — CLIP, LIGATING, HORIZON, MEDIUM, TITANIUM

## (undated) DEVICE — STAPLER, ENDO ECHELON 45MM RELOAD, WHITE, REUSABLE

## (undated) DEVICE — GOWN, SURGICAL, SMARTGOWN, XLARGE, STERILE

## (undated) DEVICE — DRAPE, SHEET, MINOR PROCEDURE, T, PEDIATRIC, 100X122X77

## (undated) DEVICE — APPLICATOR, CHLORAPREP, W/ORANGE TINT, 26ML

## (undated) DEVICE — HANDPIECE, ABC, SINGLE FUNCTION, MALLEABLE, W/CORD & HOLSTER, BEND-A-BEAM, 3 IN, LF

## (undated) DEVICE — MANIFOLD, 4 PORT NEPTUNE STANDARD